# Patient Record
Sex: FEMALE | Race: BLACK OR AFRICAN AMERICAN | NOT HISPANIC OR LATINO | Employment: UNEMPLOYED | URBAN - METROPOLITAN AREA
[De-identification: names, ages, dates, MRNs, and addresses within clinical notes are randomized per-mention and may not be internally consistent; named-entity substitution may affect disease eponyms.]

---

## 2017-01-03 ENCOUNTER — OFFICE VISIT (OUTPATIENT)
Dept: ENDOCRINOLOGY | Facility: CLINIC | Age: 37
End: 2017-01-03
Payer: COMMERCIAL

## 2017-01-03 VITALS
BODY MASS INDEX: 26.05 KG/M2 | SYSTOLIC BLOOD PRESSURE: 114 MMHG | WEIGHT: 152.56 LBS | DIASTOLIC BLOOD PRESSURE: 81 MMHG | HEIGHT: 64 IN | HEART RATE: 80 BPM | RESPIRATION RATE: 18 BRPM

## 2017-01-03 DIAGNOSIS — E04.9 GOITER: ICD-10-CM

## 2017-01-03 DIAGNOSIS — K21.9 GERD WITHOUT ESOPHAGITIS: ICD-10-CM

## 2017-01-03 DIAGNOSIS — E06.9 THYROIDITIS: ICD-10-CM

## 2017-01-03 DIAGNOSIS — F90.9 ATTENTION DEFICIT HYPERACTIVITY DISORDER (ADHD), UNSPECIFIED ADHD TYPE: ICD-10-CM

## 2017-01-03 DIAGNOSIS — E11.65 TYPE 2 DIABETES MELLITUS WITH HYPERGLYCEMIA, WITHOUT LONG-TERM CURRENT USE OF INSULIN: ICD-10-CM

## 2017-01-03 DIAGNOSIS — E03.9 HYPOTHYROIDISM (ACQUIRED): Primary | ICD-10-CM

## 2017-01-03 DIAGNOSIS — F32.89 OTHER DEPRESSION: ICD-10-CM

## 2017-01-03 DIAGNOSIS — F41.9 ANXIETY: ICD-10-CM

## 2017-01-03 DIAGNOSIS — G43.809 OTHER MIGRAINE WITHOUT STATUS MIGRAINOSUS, NOT INTRACTABLE: ICD-10-CM

## 2017-01-03 DIAGNOSIS — E55.9 HYPOVITAMINOSIS D: ICD-10-CM

## 2017-01-03 DIAGNOSIS — M79.7 FIBROMYALGIA: ICD-10-CM

## 2017-01-03 PROBLEM — F32.A DEPRESSION: Status: ACTIVE | Noted: 2017-01-03

## 2017-01-03 PROCEDURE — 1159F MED LIST DOCD IN RCRD: CPT | Mod: S$GLB,,, | Performed by: INTERNAL MEDICINE

## 2017-01-03 PROCEDURE — 2022F DILAT RTA XM EVC RTNOPTHY: CPT | Mod: S$GLB,,, | Performed by: INTERNAL MEDICINE

## 2017-01-03 PROCEDURE — 3044F HG A1C LEVEL LT 7.0%: CPT | Mod: S$GLB,,, | Performed by: INTERNAL MEDICINE

## 2017-01-03 PROCEDURE — 3060F POS MICROALBUMINURIA REV: CPT | Mod: S$GLB,,, | Performed by: INTERNAL MEDICINE

## 2017-01-03 PROCEDURE — 99214 OFFICE O/P EST MOD 30 MIN: CPT | Mod: S$GLB,,, | Performed by: INTERNAL MEDICINE

## 2017-01-03 PROCEDURE — 99999 PR PBB SHADOW E&M-EST. PATIENT-LVL III: CPT | Mod: PBBFAC,,, | Performed by: INTERNAL MEDICINE

## 2017-01-03 RX ORDER — IBUPROFEN 800 MG/1
800 TABLET ORAL 3 TIMES DAILY
COMMUNITY
End: 2017-04-20

## 2017-01-03 RX ORDER — DILTIAZEM HYDROCHLORIDE 180 MG/1
180 CAPSULE, COATED, EXTENDED RELEASE ORAL DAILY
COMMUNITY
End: 2018-03-06

## 2017-01-03 NOTE — MR AVS SNAPSHOT
[facility]  [address] [place] [address]  Phone: [phone]                  Moris VARGAS Dominic   1/3/2017 4:30 PM   Office Visit    Description:  Female : 1980   Provider:  Dany Rukcer MD   Department:  [facility]           Diagnoses this Visit        Comments    Hypothyroidism (acquired)    -  Primary     Goiter         Thyroiditis         GERD without esophagitis         Hypovitaminosis D         Fibromyalgia         Type 2 diabetes mellitus with hyperglycemia, without long-term current use of insulin         Other migraine without status migrainosus, not intractable         Attention deficit hyperactivity disorder (ADHD), unspecified ADHD type         Anxiety         Other depression                To Do List           Future Appointments        Provider Department Dept Phone    2017 4:30 PM Dany Rucker MD [facility] [phone]      Goals (5 Years of Data)     None      Follow-Up and Disposition     Return in about 3 months (around 4/3/2017).      [facility]     [facility] Nurse [facility] Line - 24/7 Assistance  Registered nurses in the [facility] provide clinical advisement, health education, appointment booking, and other advisory services.  Call for this free service at [phone].             Medications           Message regarding Medications     Verify the changes and/or additions to your medication regime listed below are the same as discussed with your clinician today.  If any of these changes or additions are incorrect, please notify your healthcare provider.             Verify that the below list of medications is an accurate representation of the medications you are currently taking.  If none reported, the list may be blank. If incorrect, please contact your healthcare provider. Carry this list with you in case of emergency.           Current Medications     cyclobenzaprine (FLEXERIL) 10 MG  "tablet Take 10 mg by mouth 2 (two) times daily.     dextroamphetamine-amphetamine (ADDERALL) 20 mg tablet Take 1 tablet by mouth 2 (two) times daily.     diltiaZEM (CARDIZEM CD) 180 MG 24 hr capsule Take 180 mg by mouth once daily.    ergocalciferol (VITAMIN D2) 50,000 unit Cap Take 1 capsule (50,000 Units total) by mouth every 7 days.    gabapentin (NEURONTIN) 800 MG tablet Take 800 mg by mouth 3 (three) times daily.    ibuprofen (ADVIL,MOTRIN) 800 MG tablet Take 800 mg by mouth 3 (three) times daily.    metformin (GLUCOPHAGE-XR) 500 MG 24 hr tablet TAKE 1 TABLET BY MOUTH TWICE DAILY WITH MEALS    omeprazole (PRILOSEC) 40 MG capsule TAKE 1 CAPSULE BY MOUTH EVERY DAILY    propranolol (INDERAL LA) 80 MG 24 hr capsule Take 80 mg by mouth once daily.     rizatriptan (MAXALT) 10 MG tablet Take 1 tablet (10 mg total) by mouth daily as needed.    venlafaxine (EFFEXOR-XR) 150 MG Cp24 Take 150 mg by mouth once daily.     zolpidem (AMBIEN CR) 12.5 MG CR tablet Take 1 tablet by mouth nightly as needed.    levothyroxine (SYNTHROID) 88 MCG tablet Take 1 tablet (88 mcg total) by mouth once daily.    meclofenamate (MECLOMEN) 100 MG capsule Take 1 capsule (100 mg total) by mouth 3 (three) times daily.    topiramate (TOPAMAX) 50 MG tablet Take 1 tablet (50 mg total) by mouth 2 (two) times daily.           Clinical Reference Information           Vital Signs - Last Recorded  Most recent update: 1/3/2017  4:37 PM by Sheila Mcgill RN    BP Pulse Resp Ht Wt BMI    114/81 80 18 5' 4" (1.626 m) 69.2 kg (152 lb 8.9 oz) 26.19 kg/m2      Blood Pressure          Most Recent Value    BP  114/81      Allergies as of 1/3/2017     Metronidazole      Immunizations Administered on Date of Encounter - 1/3/2017     None      Orders Placed During Today's Visit     Future Labs/Procedures Expected by Expires    Fructosamine  1/3/2017 3/4/2018    GlycoMark (TM)  1/3/2017 3/4/2018    Hemoglobin A1c  1/3/2017 3/4/2018    Lipid panel  1/3/2017 3/4/2018    " Microalbumin/creatinine urine ratio  1/3/2017 3/4/2018    T3  1/3/2017 3/4/2018    T4, free  1/3/2017 3/4/2018    Thyroglobulin  1/3/2017 3/4/2018    TSH  1/3/2017 3/4/2018    Uric acid  1/3/2017 3/4/2018    Urinalysis  1/3/2017 3/4/2018    Vitamin D  1/3/2017 3/4/2018

## 2017-01-03 NOTE — PROGRESS NOTES
Subjective:      Patient ID: Moris Alfaro is a 36 y.o. female.    Chief Complaint:  36 yr old lady with hypothyroidism on thyroid hormone repletion and type 2 diabetes.      History of Present Illness    Patient is a 36 yr old lady seen in [facility] on account of subclinical hypothyroidism due to Hashimoto's thyroiditis. She also has a background history of chronic migraines, obesity, early onset type 2 diabetes,  anxiety neurosis, conversion disorder and pseudoseizures in the past in addition to GERD.  Patient also has a poorly defined background past history of fibromyalgia and  had been commenced on thyroid hormone repletion with Lt4 @ 88mcg QD.  She is presently on metformin 500mg once DAILY for type 2 diabetes  In addition she had a screening thyroid ultrasound (from 07/15) which was essentially -ve for any significant thyroid nodular disease.  Patient feels well. She is sleeping a little better now but continues to have episodic insomnia.  She is s/p CORINA for endometriosis but has her ovaries insitu. She has been having intermittent hot flashes and drenching sweats and also has some mouth dryness for which she takes regular free fluid. Her hormonal testing however has not showed her to be perimenopausal.  Patient had her opthalmic screening done last year and this was normal with no evidence of retinopathy.  Patient does not receive flu vaccinations by choice. Patient has also never pneumovax again by choice.  Discussed with the patient the pros and cons of considering receiving both vaccinations as she has never received either of them before.  She has no other fresh complaints today.  Review of Systems   Constitutional: Negative for diaphoresis and fatigue.   HENT: Negative for trouble swallowing.    Cardiovascular: Negative for chest pain.   Endocrine: Negative for polyuria.   Musculoskeletal: Negative for arthralgias, back pain and myalgias.   Skin: Negative for color change, pallor and rash.  "  Neurological: Negative for dizziness, numbness and headaches.   Hematological: Does not bruise/bleed easily.   Psychiatric/Behavioral: Negative for confusion.       Objective:  Visit Vitals    /81    Pulse 80    Resp 18    Ht 5' 4" (1.626 m)    Wt 69.2 kg (152 lb 8.9 oz)    BMI 26.19 kg/m2        Physical Exam   Constitutional: She is oriented to person, place, and time. She appears well-developed and well-nourished. No distress.   Pleasant young lady. Not pale, anicteric and afebrile. Well hydrated. Not in any apparent acute distress.   HENT:   Head: Normocephalic and atraumatic.   Mouth/Throat: Oropharynx is clear and moist. No oropharyngeal exudate.   Eyes: Conjunctivae and EOM are normal. Pupils are equal, round, and reactive to light. No scleral icterus.   Neck: Normal range of motion. Neck supple. No JVD present. Thyromegaly present.   Mild non tender thyromegaly today. No cervical adenopathy   Cardiovascular: Normal rate, regular rhythm and normal heart sounds.    No murmur heard.  Pulmonary/Chest: Effort normal and breath sounds normal. No respiratory distress. She has no wheezes.   Abdominal: Soft.   Musculoskeletal: Normal range of motion.   Lymphadenopathy:     She has no cervical adenopathy.   Neurological: She is alert and oriented to person, place, and time. No cranial nerve deficit.   Skin: Skin is warm and dry. No rash noted. She is not diaphoretic. No erythema. No pallor.   Patches and macules on lateral aspects of both thighs.  Mildly scaly, hypopigmented.   Psychiatric: She has a normal mood and affect. Her behavior is normal. Judgment and thought content normal.   Vitals reviewed.      Lab Review:     Results for LILLIAM JEAN (MRN [id number]) as of 1/3/2017 17:01   Ref. Range 9/26/2016 09:04   Sodium Latest Ref Range: 136 - 145 mmol/L 142   Potassium Latest Ref Range: 3.5 - 5.1 mmol/L 3.8   Chloride Latest Ref Range: 95 - 110 mmol/L 111 (H)   CO2 Latest Ref Range: 23 - 29 " mmol/L 20 (L)   Anion Gap Latest Ref Range: 8 - 16 mmol/L 11   BUN, Bld Latest Ref Range: 6 - 20 mg/dL 8   Creatinine Latest Ref Range: 0.5 - 1.4 mg/dL 0.8   eGFR if non African American Latest Ref Range: >60 mL/min/1.73 m^2 >60.0   eGFR if African American Latest Ref Range: >60 mL/min/1.73 m^2 >60.0   Glucose Latest Ref Range: 70 - 110 mg/dL 86   Calcium Latest Ref Range: 8.7 - 10.5 mg/dL 8.8   Alkaline Phosphatase Latest Ref Range: 55 - 135 U/L 54 (L)   Total Protein Latest Ref Range: 6.0 - 8.4 g/dL 6.9   Albumin Latest Ref Range: 3.5 - 5.2 g/dL 3.6   Total Bilirubin Latest Ref Range: 0.1 - 1.0 mg/dL 0.3   AST Latest Ref Range: 10 - 40 U/L 11   ALT Latest Ref Range: 10 - 44 U/L 10   Vit D, 25-Hydroxy Latest Ref Range: 30 - 96 ng/mL 36   Hemoglobin A1C Latest Ref Range: 4.5 - 6.2 % 5.4   Estimated Avg Glucose Latest Ref Range: 68 - 131 mg/dL 108   GlycoMark (TM) Latest Units: ug/mL 19.2   TSH Latest Ref Range: 0.400 - 4.000 uIU/mL 0.290 (L)   T3, Total Latest Ref Range: 60 - 180 ng/dL 53 (L)   Free T4 Latest Ref Range: 0.71 - 1.51 ng/dL 1.35   Thyroglobulin Interpretation Unknown SEE BELOW   Thyroglobulin Antibody Screen Latest Ref Range: <4.0 IU/mL <1.8   Thyroglobulin, Tumor Marker Latest Units: ng/mL 17 (H)   Fructosamine Latest Ref Range: 0 - 285 umol /L 181       Assessment:     1. Hypothyroidism (acquired)  T4, free    T3    TSH    Thyroglobulin    Lipid panel    Uric acid   2. Goiter     3. Thyroiditis     4. GERD without esophagitis     5. Hypovitaminosis D  Vitamin D   6. Fibromyalgia     7. Type 2 diabetes mellitus with hyperglycemia, without long-term current use of insulin  Hemoglobin A1c    Fructosamine    GlycoMark (TM)    Lipid panel    Microalbumin/creatinine urine ratio    Urinalysis   8. Other migraine without status migrainosus, not intractable     9. Attention deficit hyperactivity disorder (ADHD), unspecified ADHD type     10. Anxiety     11. Other depression          Regarding hypothyroidism;  Patient appears clinically euthyroid. To continue LT4 at present dose and may adjust LT4 dose based on results  Regarding early diabetes; Well controlled thus far on metformin; to continue metformin 500mg QD for now and if her HBA1c remains 5.4 or under will have her stop the metformin and she if she can be adequately managed with dietary modifications only.   Regarding GERD; symptomatically stable on PPI therapy.  Regarding hypovitaminosis D; to continue vitamin D supplementation and will recheck 25OH vitamin d levels.  Regarding depression; mood stable. To continue Effexor XR as before    Plan:     FFup in ~ 3mths

## 2017-01-05 ENCOUNTER — LAB VISIT (OUTPATIENT)
Dept: LAB | Facility: HOSPITAL | Age: 37
End: 2017-01-05
Attending: INTERNAL MEDICINE
Payer: COMMERCIAL

## 2017-01-05 DIAGNOSIS — E03.9 HYPOTHYROIDISM (ACQUIRED): ICD-10-CM

## 2017-01-05 DIAGNOSIS — E55.9 HYPOVITAMINOSIS D: ICD-10-CM

## 2017-01-05 DIAGNOSIS — E11.65 TYPE 2 DIABETES MELLITUS WITH HYPERGLYCEMIA, WITHOUT LONG-TERM CURRENT USE OF INSULIN: ICD-10-CM

## 2017-01-05 LAB
25(OH)D3+25(OH)D2 SERPL-MCNC: 37 NG/ML
CHOLEST/HDLC SERPL: 5.1 {RATIO}
HDL/CHOLESTEROL RATIO: 19.5 %
HDLC SERPL-MCNC: 262 MG/DL
HDLC SERPL-MCNC: 51 MG/DL
LDLC SERPL CALC-MCNC: 185.6 MG/DL
NONHDLC SERPL-MCNC: 211 MG/DL
T3 SERPL-MCNC: 88 NG/DL
T4 FREE SERPL-MCNC: 0.94 NG/DL
TRIGL SERPL-MCNC: 127 MG/DL
TSH SERPL DL<=0.005 MIU/L-ACNC: 0.84 UIU/ML
URATE SERPL-MCNC: 4.8 MG/DL

## 2017-01-05 PROCEDURE — 82306 VITAMIN D 25 HYDROXY: CPT

## 2017-01-05 PROCEDURE — 84443 ASSAY THYROID STIM HORMONE: CPT

## 2017-01-05 PROCEDURE — 84480 ASSAY TRIIODOTHYRONINE (T3): CPT

## 2017-01-05 PROCEDURE — 84439 ASSAY OF FREE THYROXINE: CPT

## 2017-01-05 PROCEDURE — 82985 ASSAY OF GLYCATED PROTEIN: CPT

## 2017-01-05 PROCEDURE — 86800 THYROGLOBULIN ANTIBODY: CPT

## 2017-01-05 PROCEDURE — 84378 SUGARS SINGLE QUANT: CPT

## 2017-01-05 PROCEDURE — 84550 ASSAY OF BLOOD/URIC ACID: CPT

## 2017-01-05 PROCEDURE — 83036 HEMOGLOBIN GLYCOSYLATED A1C: CPT

## 2017-01-05 PROCEDURE — 80061 LIPID PANEL: CPT

## 2017-01-06 PROBLEM — E78.5 HYPERLIPIDEMIA: Status: ACTIVE | Noted: 2017-01-06

## 2017-01-06 PROBLEM — E78.00 HYPERCHOLESTEROLEMIA: Status: ACTIVE | Noted: 2017-01-06

## 2017-01-06 LAB
ESTIMATED AVG GLUCOSE: 108 MG/DL
HBA1C MFR BLD HPLC: 5.4 %
THRYOGLOBULIN INTERPRETATION: ABNORMAL
THYROGLOB AB SERPL-ACNC: <1.8 IU/ML
THYROGLOB SERPL-MCNC: 12 NG/ML

## 2017-01-08 LAB — GLYCOMARK (TM): 20.5 UG/ML

## 2017-01-10 ENCOUNTER — PATIENT MESSAGE (OUTPATIENT)
Dept: ENDOCRINOLOGY | Facility: CLINIC | Age: 37
End: 2017-01-10

## 2017-01-10 LAB — FRUCTOSAMINE SERPL-SCNC: 205 UMOL /L (ref 0–285)

## 2017-01-13 DIAGNOSIS — E11.65 TYPE 2 DIABETES MELLITUS WITH HYPERGLYCEMIA, WITHOUT LONG-TERM CURRENT USE OF INSULIN: ICD-10-CM

## 2017-01-13 RX ORDER — METFORMIN HYDROCHLORIDE 500 MG/1
500 TABLET, EXTENDED RELEASE ORAL 2 TIMES DAILY WITH MEALS
Qty: 180 TABLET | Refills: 3 | Status: SHIPPED | OUTPATIENT
Start: 2017-01-13 | End: 2017-02-09

## 2017-02-09 ENCOUNTER — OFFICE VISIT (OUTPATIENT)
Dept: DERMATOLOGY | Facility: CLINIC | Age: 37
End: 2017-02-09
Payer: COMMERCIAL

## 2017-02-09 VITALS — HEIGHT: 64 IN | BODY MASS INDEX: 25.95 KG/M2 | WEIGHT: 152 LBS

## 2017-02-09 DIAGNOSIS — D22.9 MULTIPLE BENIGN NEVI: ICD-10-CM

## 2017-02-09 DIAGNOSIS — Z12.83 SKIN CANCER SCREENING: ICD-10-CM

## 2017-02-09 DIAGNOSIS — L82.1 DERMATOSIS PAPULOSA NIGRA: Primary | ICD-10-CM

## 2017-02-09 DIAGNOSIS — L82.1 SEBORRHEIC KERATOSES: ICD-10-CM

## 2017-02-09 PROCEDURE — 99213 OFFICE O/P EST LOW 20 MIN: CPT | Mod: S$GLB,,, | Performed by: DERMATOLOGY

## 2017-02-09 PROCEDURE — 99999 PR PBB SHADOW E&M-EST. PATIENT-LVL II: CPT | Mod: PBBFAC,,, | Performed by: DERMATOLOGY

## 2017-02-09 NOTE — PROGRESS NOTES
Subjective:       Patient ID:  Moris Alfaro is a 37 y.o. female who presents for   Chief Complaint   Patient presents with    Skin Check     TBSE    Spot     Left shoulder     HPI Comments: Patient last seen 5/10/2016 with dychromia ant thighs, resolved with sun avoidance  Here for FU skin check and new complaint      Spot  - Initial  Affected locations: Left shoulder.  Signs / symptoms: growing  Severity: mild to moderate  Timing: constant  Aggravated by: nothing  Relieving factors/Treatments tried: nothing        Review of Systems   Respiratory: Positive for shortness of breath (cardiology PARKER ).    Musculoskeletal: Positive for arthralgias (attributes to fibromyalgia).   Skin: Negative for sun sensitivity, daily sunscreen use, activity-related sunscreen use and recent sunburn.        Objective:    Physical Exam   Constitutional: She appears well-developed and well-nourished. No distress.   Neurological: She is alert and oriented to person, place, and time. She is not disoriented.   Psychiatric: She has a normal mood and affect.   Skin:   Areas Examined (abnormalities noted in diagram):   Head / Face Inspection Performed  Chest / Axilla Inspection Performed  Abdomen Inspection Performed  Genitals / Buttocks / Groin Inspection Performed  Back Inspection Performed  RUE Inspected  LUE Inspection Performed  RLE Inspected  LLE Inspection Performed  Nails and Digits Inspection Performed                       Diagram Legend     Erythematous scaling macule/papule c/w actinic keratosis       Vascular papule c/w angioma      Pigmented verrucoid papule/plaque c/w seborrheic keratosis      Yellow umbilicated papule c/w sebaceous hyperplasia      Irregularly shaped tan macule c/w lentigo     1-2 mm smooth white papules consistent with Milia      Movable subcutaneous cyst with punctum c/w epidermal inclusion cyst      Subcutaneous movable cyst c/w pilar cyst      Firm pink to brown papule c/w dermatofibroma       Pedunculated fleshy papule(s) c/w skin tag(s)      Evenly pigmented macule c/w junctional nevus     Mildly variegated pigmented, slightly irregular-bordered macule c/w mildly atypical nevus      Flesh colored to evenly pigmented papule c/w intradermal nevus       Pink pearly papule/plaque c/w basal cell carcinoma      Erythematous hyperkeratotic cursted plaque c/w SCC      Surgical scar with no sign of skin cancer recurrence      Open and closed comedones      Inflammatory papules and pustules      Verrucoid papule consistent consistent with wart     Erythematous eczematous patches and plaques     Dystrophic onycholytic nail with subungual debris c/w onychomycosis     Umbilicated papule    Erythematous-base heme-crusted tan verrucoid plaque consistent with inflamed seborrheic keratosis     Erythematous Silvery Scaling Plaque c/w Psoriasis     See annotation      Assessment / Plan:        Dermatosis papulosa nigra, Seborrheic keratoses  These are benign inherited growths without a malignant potential. Reassurance given to patient. No treatment is necessary.       Multiple benign nevi;   Plantar mildly hyperpigmented macules, multiple, no concerning features    Patient instructed in importance in daily sun protection of at least spf 30. Sun avoidance and topical protection discussed.   Recommend Elta MD (physician office) COTZ sensitive (available online) for daily use on face and neck.  Patient encouraged to wear hat for all outdoor exposure.   Also discussed sun protective clothing.      Skin cancer screening  Total body skin examination performed today including at least 12 points as noted in physical examination. No lesions suspicious for malignancy noted.               Return if symptoms worsen or fail to improve.

## 2017-02-16 RX ORDER — TOPIRAMATE 50 MG/1
50 TABLET, FILM COATED ORAL 2 TIMES DAILY
Qty: 60 TABLET | Refills: 11 | Status: SHIPPED | OUTPATIENT
Start: 2017-02-16 | End: 2018-03-15 | Stop reason: SDUPTHER

## 2017-03-14 ENCOUNTER — PATIENT MESSAGE (OUTPATIENT)
Dept: NEUROLOGY | Facility: CLINIC | Age: 37
End: 2017-03-14

## 2017-03-14 ENCOUNTER — OFFICE VISIT (OUTPATIENT)
Dept: OBSTETRICS AND GYNECOLOGY | Facility: CLINIC | Age: 37
End: 2017-03-14
Payer: COMMERCIAL

## 2017-03-14 ENCOUNTER — LAB VISIT (OUTPATIENT)
Dept: LAB | Facility: HOSPITAL | Age: 37
End: 2017-03-14
Attending: SPECIALIST
Payer: COMMERCIAL

## 2017-03-14 VITALS
WEIGHT: 150.13 LBS | BODY MASS INDEX: 25.63 KG/M2 | SYSTOLIC BLOOD PRESSURE: 120 MMHG | DIASTOLIC BLOOD PRESSURE: 72 MMHG | HEIGHT: 64 IN

## 2017-03-14 DIAGNOSIS — Z11.3 SCREENING FOR STD (SEXUALLY TRANSMITTED DISEASE): ICD-10-CM

## 2017-03-14 DIAGNOSIS — R10.2 PELVIC PAIN: ICD-10-CM

## 2017-03-14 DIAGNOSIS — Z01.419 ROUTINE GYNECOLOGICAL EXAMINATION: Primary | ICD-10-CM

## 2017-03-14 PROCEDURE — 99385 PREV VISIT NEW AGE 18-39: CPT | Mod: S$GLB,,, | Performed by: SPECIALIST

## 2017-03-14 PROCEDURE — 87624 HPV HI-RISK TYP POOLED RSLT: CPT

## 2017-03-14 PROCEDURE — 99999 PR PBB SHADOW E&M-EST. PATIENT-LVL IV: CPT | Mod: PBBFAC,,, | Performed by: SPECIALIST

## 2017-03-14 PROCEDURE — 80074 ACUTE HEPATITIS PANEL: CPT

## 2017-03-14 PROCEDURE — 30000890 MAYO MISCELLANEOUS TEST (REFLEX)

## 2017-03-14 PROCEDURE — 36415 COLL VENOUS BLD VENIPUNCTURE: CPT | Mod: PO

## 2017-03-14 PROCEDURE — 88141 CYTOPATH C/V INTERPRET: CPT | Mod: ,,, | Performed by: PATHOLOGY

## 2017-03-14 PROCEDURE — 86695 HERPES SIMPLEX TYPE 1 TEST: CPT | Mod: 59

## 2017-03-14 PROCEDURE — 86694 HERPES SIMPLEX NES ANTBDY: CPT

## 2017-03-14 PROCEDURE — 86703 HIV-1/HIV-2 1 RESULT ANTBDY: CPT

## 2017-03-14 PROCEDURE — 88175 CYTOPATH C/V AUTO FLUID REDO: CPT | Performed by: PATHOLOGY

## 2017-03-14 PROCEDURE — 1160F RVW MEDS BY RX/DR IN RCRD: CPT | Mod: S$GLB,,, | Performed by: SPECIALIST

## 2017-03-14 PROCEDURE — 86592 SYPHILIS TEST NON-TREP QUAL: CPT

## 2017-03-14 PROCEDURE — 86696 HERPES SIMPLEX TYPE 2 TEST: CPT

## 2017-03-14 PROCEDURE — 86695 HERPES SIMPLEX TYPE 1 TEST: CPT

## 2017-03-14 NOTE — PROGRESS NOTES
36 yo BF presents for annual gyn evaluation. Pt with hx of hysterectomy. Pt also c/o ocassional pelvic pain over several months. Pt denies weight loss/gain, constipation/diarrhea, f/c, dysuria, hematuria, flank pain, n/v.  Past Medical History:   Diagnosis Date    Anxiety     Endometriosis, uterus     uterine bladder and ovaries    Fibromyalgia     Migraine     Migraine headache     Mitral valve prolapse     Seizures     Toe fracture        Past Surgical History:   Procedure Laterality Date    ecsi      HYSTERECTOMY  2007    CORINA due to DUB,  ovaries intact    lumbar facet injection      VT EXPLORATORY OF ABDOMEN      RHIZOTOMY      TUBAL LIGATION         Family History   Problem Relation Age of Onset    Thyroid disease Mother     Heart disease Father     Hypertension Father     Pancreatitis Father     Diabetes Father     Stomach cancer Paternal Aunt     Stomach cancer Paternal Uncle     Cancer Paternal Uncle      lung cancer    Melanoma Neg Hx     Psoriasis Neg Hx     Lupus Neg Hx     Eczema Neg Hx        Social History     Social History    Marital status: Legally      Spouse name: N/A    Number of children: N/A    Years of education: N/A     Social History Main Topics    Smoking status: Never Smoker    Smokeless tobacco: Never Used    Alcohol use Yes      Comment: OCCASIONALLY    Drug use: No    Sexual activity: Yes     Partners: Male     Birth control/ protection: None     Other Topics Concern    None     Social History Narrative       Current Outpatient Prescriptions   Medication Sig Dispense Refill    cyclobenzaprine (FLEXERIL) 10 MG tablet Take 10 mg by mouth 2 (two) times daily.       dextroamphetamine-amphetamine (ADDERALL) 20 mg tablet Take 1 tablet by mouth 2 (two) times daily.       diltiaZEM (CARDIZEM CD) 180 MG 24 hr capsule Take 180 mg by mouth once daily.      ergocalciferol (VITAMIN D2) 50,000 unit Cap Take 1 capsule (50,000 Units total) by mouth every  7 days. 12 capsule 3    gabapentin (NEURONTIN) 800 MG tablet Take 800 mg by mouth 3 (three) times daily.  3    omeprazole (PRILOSEC) 40 MG capsule TAKE 1 CAPSULE BY MOUTH EVERY DAILY 90 capsule 3    propranolol (INDERAL LA) 80 MG 24 hr capsule Take 80 mg by mouth once daily.   0    rizatriptan (MAXALT) 10 MG tablet Take 1 tablet (10 mg total) by mouth daily as needed. 9 tablet 5    topiramate (TOPAMAX) 50 MG tablet Take 1 tablet (50 mg total) by mouth 2 (two) times daily. 60 tablet 11    venlafaxine (EFFEXOR-XR) 150 MG Cp24 Take 150 mg by mouth once daily.       zolpidem (AMBIEN CR) 12.5 MG CR tablet Take 1 tablet by mouth nightly as needed.  3    ibuprofen (ADVIL,MOTRIN) 800 MG tablet Take 800 mg by mouth 3 (three) times daily.      levothyroxine (SYNTHROID) 88 MCG tablet Take 1 tablet (88 mcg total) by mouth once daily. 90 tablet 3     No current facility-administered medications for this visit.        Review of patient's allergies indicates:   Allergen Reactions    Metronidazole Hives and Itching       PE:   APPEARANCE: Well nourished, well developed, in no acute distress.  NECK: Neck symmetric without masses or thyromegaly.  NODES: No inguinal lymph node enlargement.  ABDOMEN: Soft. No tenderness or masses. No hepatosplenomegaly. No hernias.  BREASTS: Symmetrical, no skin changes or visible lesions. No palpable masses, nipple discharge or adenopathy bilaterally.  PELVIC: Normal external female genitalia without lesions. Normal hair distribution. Adequate perineal body, normal urethral meatus. Vagina moist and well rugated without lesions or discharge. No significant cystocele or rectocele. Uterus and cervix surgically absent. Bimanual exam revealed no masses, tenderness or abnormality.      I discussed etiologies of pelvic pain and recommend a pelvic u/s to investigate further.  I will follow

## 2017-03-14 NOTE — MR AVS SNAPSHOT
[facility]  [address] [place] [address]  Phone: [phone]                  Moris Alfaro   3/14/2017 1:20 PM   Office Visit    Description:  Female : 1980   Provider:  Chris Hernandez MD   Department:  [facility]           Reason for Visit     Well Woman     Pelvic Pain     rt underarm cyst           Diagnoses this Visit        Comments    Routine gynecological examination    -  Primary            To Do List           Future Appointments        Provider Department Dept Phone    2017 4:30 PM Dany Rucker MD [facility] [phone]      Goals (5 Years of Data)     None      [facility]     [facility] -  Assistance  Registered nurses in the [facility] provide clinical advisement, health education, appointment booking, and other advisory services.  Call for this free service at [phone].             Medications           Message regarding Medications     Verify the changes and/or additions to your medication regime listed below are the same as discussed with your clinician today.  If any of these changes or additions are incorrect, please notify your healthcare provider.             Verify that the below list of medications is an accurate representation of the medications you are currently taking.  If none reported, the list may be blank. If incorrect, please contact your healthcare provider. Carry this list with you in case of emergency.           Current Medications     cyclobenzaprine (FLEXERIL) 10 MG tablet Take 10 mg by mouth 2 (two) times daily.     dextroamphetamine-amphetamine (ADDERALL) 20 mg tablet Take 1 tablet by mouth 2 (two) times daily.     diltiaZEM (CARDIZEM CD) 180 MG 24 hr capsule Take 180 mg by mouth once daily.    ergocalciferol (VITAMIN D2) 50,000 unit Cap Take 1 capsule (50,000 Units total) by mouth every 7 days.    gabapentin (NEURONTIN) 800 MG tablet Take 800 mg by  "mouth 3 (three) times daily.    omeprazole (PRILOSEC) 40 MG capsule TAKE 1 CAPSULE BY MOUTH EVERY DAILY    propranolol (INDERAL LA) 80 MG 24 hr capsule Take 80 mg by mouth once daily.     rizatriptan (MAXALT) 10 MG tablet Take 1 tablet (10 mg total) by mouth daily as needed.    topiramate (TOPAMAX) 50 MG tablet Take 1 tablet (50 mg total) by mouth 2 (two) times daily.    venlafaxine (EFFEXOR-XR) 150 MG Cp24 Take 150 mg by mouth once daily.     zolpidem (AMBIEN CR) 12.5 MG CR tablet Take 1 tablet by mouth nightly as needed.    ibuprofen (ADVIL,MOTRIN) 800 MG tablet Take 800 mg by mouth 3 (three) times daily.    levothyroxine (SYNTHROID) 88 MCG tablet Take 1 tablet (88 mcg total) by mouth once daily.           Clinical Reference Information           Your Vitals Were     BP Height Weight BMI       120/72 5' 4" (1.626 m) 68.1 kg (150 lb 2.1 oz) 25.77 kg/m2       Blood Pressure          Most Recent Value    BP  120/72      Allergies as of 3/14/2017     Metronidazole      Immunizations Administered on Date of Encounter - 3/14/2017     None      Orders Placed During Today's Visit      Normal Orders This Visit    Liquid-based pap smear, screening       Language Assistance Services     ATTENTION: Language assistance services are available, free of charge. Please call [phone].      ATENCIÓN: Si habla daynaañol, tiene a paulson disposición servicios gratuitos de asistencia lingüística. Llame al [phone].     CHÚ Ý: N?u b?n nói Ti?ng Vi?t, có các d?ch v? h? tr? ngôn ng? mi?n phí dành cho b?n. G?i s? [phone].         [facility] complies with applicable Federal civil rights laws and does not discriminate on the basis of race, color, national origin, age, disability, or sex.        "

## 2017-03-15 ENCOUNTER — HOSPITAL ENCOUNTER (OUTPATIENT)
Dept: RADIOLOGY | Facility: CLINIC | Age: 37
Discharge: HOME | End: 2017-03-15
Attending: SPECIALIST
Payer: COMMERCIAL

## 2017-03-15 DIAGNOSIS — R10.2 PELVIC PAIN: ICD-10-CM

## 2017-03-15 LAB
C TRACH DNA SPEC QL NAA+PROBE: NEGATIVE
HAV IGM SERPL QL IA: NEGATIVE
HBV CORE IGM SERPL QL IA: NEGATIVE
HBV SURFACE AG SERPL QL IA: NEGATIVE
HCV AB SERPL QL IA: NEGATIVE
HIV 1+2 AB+HIV1 P24 AG SERPL QL IA: NEGATIVE
N GONORRHOEA DNA SPEC QL NAA+PROBE: NEGATIVE
RPR SER QL: NORMAL

## 2017-03-15 PROCEDURE — 76856 US EXAM PELVIC COMPLETE: CPT | Mod: TC,PO

## 2017-03-15 PROCEDURE — 76856 US EXAM PELVIC COMPLETE: CPT | Mod: 26,,, | Performed by: RADIOLOGY

## 2017-03-15 PROCEDURE — 76830 TRANSVAGINAL US NON-OB: CPT | Mod: 26,,, | Performed by: RADIOLOGY

## 2017-03-16 ENCOUNTER — PATIENT MESSAGE (OUTPATIENT)
Dept: OBSTETRICS AND GYNECOLOGY | Facility: CLINIC | Age: 37
End: 2017-03-16

## 2017-03-16 DIAGNOSIS — R94.6 ABNORMAL THYROID FUNCTION TEST: ICD-10-CM

## 2017-03-16 DIAGNOSIS — E03.4 HYPOTHYROIDISM DUE TO ACQUIRED ATROPHY OF THYROID: ICD-10-CM

## 2017-03-16 LAB — MAYO MISCELLANEOUS RESULT (REF): NORMAL

## 2017-03-16 RX ORDER — LEVOTHYROXINE SODIUM 88 UG/1
88 TABLET ORAL DAILY
Qty: 90 TABLET | Refills: 3 | Status: SHIPPED | OUTPATIENT
Start: 2017-03-16 | End: 2017-10-24 | Stop reason: SDUPTHER

## 2017-03-17 LAB
HSV1 IGG SERPL QL IA: NEGATIVE
HSV2 IGG SERPL QL IA: NEGATIVE

## 2017-03-21 ENCOUNTER — OFFICE VISIT (OUTPATIENT)
Dept: NEUROLOGY | Facility: CLINIC | Age: 37
End: 2017-03-21
Payer: COMMERCIAL

## 2017-03-21 VITALS
HEIGHT: 64 IN | SYSTOLIC BLOOD PRESSURE: 114 MMHG | BODY MASS INDEX: 25.44 KG/M2 | WEIGHT: 149 LBS | DIASTOLIC BLOOD PRESSURE: 72 MMHG | HEART RATE: 82 BPM | RESPIRATION RATE: 20 BRPM

## 2017-03-21 DIAGNOSIS — R53.1 LEFT-SIDED WEAKNESS: Primary | ICD-10-CM

## 2017-03-21 PROCEDURE — 1160F RVW MEDS BY RX/DR IN RCRD: CPT | Mod: S$GLB,,, | Performed by: PSYCHIATRY & NEUROLOGY

## 2017-03-21 PROCEDURE — 99999 PR PBB SHADOW E&M-EST. PATIENT-LVL III: CPT | Mod: PBBFAC,,, | Performed by: PSYCHIATRY & NEUROLOGY

## 2017-03-21 PROCEDURE — 99214 OFFICE O/P EST MOD 30 MIN: CPT | Mod: S$GLB,,, | Performed by: PSYCHIATRY & NEUROLOGY

## 2017-03-21 RX ORDER — ZOLMITRIPTAN 5 MG/1
1 SPRAY NASAL ONCE AS NEEDED
Qty: 6 EACH | Refills: 3 | Status: SHIPPED | OUTPATIENT
Start: 2017-03-21 | End: 2017-04-20

## 2017-03-21 RX ORDER — PROPRANOLOL HYDROCHLORIDE 80 MG/1
80 CAPSULE, EXTENDED RELEASE ORAL DAILY
Qty: 30 CAPSULE | Refills: 4 | Status: SHIPPED | OUTPATIENT
Start: 2017-03-21 | End: 2017-10-03 | Stop reason: SDUPTHER

## 2017-03-21 NOTE — MR AVS SNAPSHOT
[facility]  [address] [place] [address]  Phone: [phone]  Fax: [phone]                  Moris Alfaro   3/21/2017 1:30 PM   Office Visit    Description:  Female : 1980   Provider:  Stephanie Singer MD   Department:  [facility]           Reason for Visit     Headache           Diagnoses this Visit        Comments    Left-sided weakness    -  Primary            To Do List           Future Appointments        Provider Department Dept Phone    3/29/2017 9:00 AM [facility] MRI1 300 LB LIMIT [facility] [phone]    3/30/2017 10:40 AM Chris Hernandez MD [facility] - [phone]    2017 4:30 PM Dany Rucker MD [place] - Endo/Diabetes [phone]    2017 10:30 AM Stephanie Singer MD [facility] Neurology [phone]      Your Future Surgeries/Procedures     2017   Surgery with Chris Hernandez MD   [facility] ([place])    [address] [place] [address] [phone]              Goals (5 Years of Data)     None       These Medications        Disp Refills Start End    zolmitriptan (ZOMIG) 5 mg Spry 6 each 3 3/21/2017 3/21/2017    1 spray by Nasal route once as needed. - Nasal    Pharmacy: [facility] [address] [place] [address] [address] AT [facility] Ph #: [phone]       propranolol (INDERAL [place]) 80 MG 24 hr capsule 30 capsule 4 3/21/2017     Take 1 capsule (80 mg total) by mouth once daily. - Oral    Pharmacy: [facility] [address] [facility], [place] - [address] AT [facility] Ph #: [phone]         [facility]     [facility] -  Assistance  Registered nurses in the [facility] provide clinical advisement, health education, appointment booking, and other advisory services.  Call for this free service at [phone].             Medications           Message  regarding Medications     Verify the changes and/or additions to your medication regime listed below are the same as discussed with your clinician today.  If any of these changes or additions are incorrect, please notify your healthcare provider.        START taking these NEW medications        Refills    zolmitriptan (ZOMIG) 5 mg Spry 3    Si spray by Nasal route once as needed.    Class: Normal    Route: Nasal      CHANGE how you are taking these medications     Start Taking Instead of    propranolol (INDERAL LA) 80 MG 24 hr capsule propranolol (INDERAL LA) 80 MG 24 hr capsule    Dosage:  Take 1 capsule (80 mg total) by mouth once daily. Dosage:  Take 80 mg by mouth once daily.     Reason for Change:  Therapy completed       STOP taking these medications     rizatriptan (MAXALT) 10 MG tablet Take 1 tablet (10 mg total) by mouth daily as needed.           Verify that the below list of medications is an accurate representation of the medications you are currently taking.  If none reported, the list may be blank. If incorrect, please contact your healthcare provider. Carry this list with you in case of emergency.           Current Medications     cyclobenzaprine (FLEXERIL) 10 MG tablet Take 10 mg by mouth 2 (two) times daily.     dextroamphetamine-amphetamine (ADDERALL) 20 mg tablet Take 1 tablet by mouth 2 (two) times daily.     diltiaZEM (CARDIZEM CD) 180 MG 24 hr capsule Take 180 mg by mouth once daily.    ergocalciferol (VITAMIN D2) 50,000 unit Cap Take 1 capsule (50,000 Units total) by mouth every 7 days.    gabapentin (NEURONTIN) 800 MG tablet Take 800 mg by mouth 3 (three) times daily.    ibuprofen (ADVIL,MOTRIN) 800 MG tablet Take 800 mg by mouth 3 (three) times daily.    levothyroxine (SYNTHROID) 88 MCG tablet Take 1 tablet (88 mcg total) by mouth once daily.    omeprazole (PRILOSEC) 40 MG capsule TAKE 1 CAPSULE BY MOUTH EVERY DAILY    propranolol (INDERAL LA) 80 MG 24 hr capsule Take 1 capsule (80 mg  "total) by mouth once daily.    topiramate (TOPAMAX) 50 MG tablet Take 1 tablet (50 mg total) by mouth 2 (two) times daily.    venlafaxine (EFFEXOR-XR) 150 MG Cp24 Take 150 mg by mouth once daily.     zolpidem (AMBIEN CR) 12.5 MG CR tablet Take 1 tablet by mouth nightly as needed.    zolmitriptan (ZOMIG) 5 mg Spry 1 spray by Nasal route once as needed.           Clinical Reference Information           Your Vitals Were     BP Pulse Resp Height Weight BMI    114/72 82 20 5' 4" (1.626 m) 67.6 kg (149 lb) 25.58 kg/m2      Blood Pressure          Most Recent Value    BP  114/72      Allergies as of 3/21/2017     Metronidazole      Immunizations Administered on Date of Encounter - 3/21/2017     None      Orders Placed During Today's Visit     Future Labs/Procedures Expected by Expires    MRI Brain W WO Contrast  3/21/2017 3/21/2018      Language Assistance Services     ATTENTION: Language assistance services are available, free of charge. Please call [phone].      ATENCIÓN: Si habla sherrell, tiene a paulson disposición servicios gratuitos de asistencia lingüística. Llame al [phone].     CLAUDIA Ý: N?u b?n nói Ti?ng Vi?t, có các d?ch v? h? tr? ngôn ng? mi?n phí dành cho b?n. G?i s? [phone].         [facility] complies with applicable Federal civil rights laws and does not discriminate on the basis of race, color, national origin, age, disability, or sex.        "

## 2017-03-21 NOTE — PROGRESS NOTES
Subjective:       Patient ID: Moris Alfaro is a 36 y.o. female.    Chief Complaint: Migraine  INTERVAL HISTORY  The patient states that headaches are unchanged. Maxalt not working. She was offered Botox but does not wish to proceed. She admits to a lot of stress and fibromyalgia flare ups. She also states that her left side of the body feels weak. This is intermittent, her  will weaken and she will drop her curling iron.  HPI   The patient is a former patient of the late Dr. Cristofer Pink who presents for evaluation of Migraine. She states she has had them for at least 12 years. Onset coincided with treatment with Depo provera. Headaches are located in the frontal region, radiating to the posterior region. The quality is excruciating, throbbing/pounding and sharp.  Intensity varies from mild to moderate and severe. She has more than 15 days affected with headache per month and least 8 attacks that meet criteria for migraine. It is associated with sensitivity to light, noise, and smells, blurred vision, nausea, dizziness, problems with concentration, neck tightness and neck pain. Seldom, she wakes up in the middle of the night with an attack. No ameliorating factors. Worse with light, noise, smells, bending over and raising her head when lying in bed.    Review of Systems   Constitutional: Positive for fatigue. Negative for activity change, appetite change and fever.   HENT: Positive for postnasal drip and rhinorrhea. Negative for congestion, dental problem, hearing loss, sinus pressure, tinnitus, trouble swallowing and voice change.    Eyes: Negative for photophobia, pain, redness and visual disturbance.   Respiratory: Positive for shortness of breath. Negative for cough and chest tightness.    Cardiovascular: Positive for chest pain and palpitations. Negative for leg swelling.   Gastrointestinal: Negative for abdominal pain, blood in stool, nausea and vomiting.   Endocrine: Negative for cold intolerance  and heat intolerance.   Genitourinary: Positive for frequency. Negative for difficulty urinating, menstrual problem and urgency.   Musculoskeletal: Positive for arthralgias, back pain, myalgias and neck pain. Negative for gait problem, joint swelling and neck stiffness.   Skin: Positive for color change and rash.   Neurological: Positive for dizziness and headaches. Negative for tremors, seizures, syncope, facial asymmetry, speech difficulty, weakness, light-headedness and numbness.   Hematological: Negative for adenopathy. Does not bruise/bleed easily.   Psychiatric/Behavioral: Positive for sleep disturbance. Negative for agitation, behavioral problems, confusion, decreased concentration, self-injury and suicidal ideas. The patient is nervous/anxious. The patient is not hyperactive.          Past Medical History   Diagnosis Date    Anxiety     Endometriosis, uterus      uterine bladder and ovaries    Fibromyalgia     Migraine     Migraine headache     Mitral valve prolapse     Seizures     Toe fracture      Past Surgical History   Procedure Laterality Date    Pr exploratory of abdomen      Tubal ligation      Hysterectomy  2007     CORINA due to DUB,  ovaries intact    Ecsi      Lumbar facet injection      Rhizotomy       Family History   Problem Relation Age of Onset    Thyroid disease Mother     Heart disease Father     Hypertension Father     Pancreatitis Father     Diabetes Father     Stomach cancer Paternal Aunt     Stomach cancer Paternal Uncle     Cancer Paternal Uncle      lung cancer    Melanoma Neg Hx     Psoriasis Neg Hx     Lupus Neg Hx     Eczema Neg Hx      Social History     Social History    Marital status:      Spouse name: N/A    Number of children: N/A    Years of education: N/A     Occupational History    Not on file.     Social History Main Topics    Smoking status: Never Smoker    Smokeless tobacco: Never Used    Alcohol use Yes      Comment: OCCASIONALLY     Drug use: No    Sexual activity: Yes     Partners: Male     Other Topics Concern    Not on file     Social History Narrative     Allergies   Allergen Reactions    Metronidazole Hives and Itching       Current Outpatient Prescriptions   Medication Sig    cyclobenzaprine (FLEXERIL) 10 MG tablet Take 10 mg by mouth 2 (two) times daily.     dextroamphetamine-amphetamine (ADDERALL) 20 mg tablet Take 1 tablet by mouth 2 (two) times daily.     diltiaZEM (CARDIZEM CD) 180 MG 24 hr capsule Take 180 mg by mouth once daily.    ergocalciferol (VITAMIN D2) 50,000 unit Cap Take 1 capsule (50,000 Units total) by mouth every 7 days.    gabapentin (NEURONTIN) 800 MG tablet Take 800 mg by mouth 3 (three) times daily.    ibuprofen (ADVIL,MOTRIN) 800 MG tablet Take 800 mg by mouth 3 (three) times daily.    levothyroxine (SYNTHROID) 88 MCG tablet Take 1 tablet (88 mcg total) by mouth once daily.    omeprazole (PRILOSEC) 40 MG capsule TAKE 1 CAPSULE BY MOUTH EVERY DAILY    propranolol (INDERAL LA) 80 MG 24 hr capsule Take 80 mg by mouth once daily.     rizatriptan (MAXALT) 10 MG tablet Take 1 tablet (10 mg total) by mouth daily as needed.    topiramate (TOPAMAX) 50 MG tablet Take 1 tablet (50 mg total) by mouth 2 (two) times daily.    venlafaxine (EFFEXOR-XR) 150 MG Cp24 Take 150 mg by mouth once daily.     zolpidem (AMBIEN CR) 12.5 MG CR tablet Take 1 tablet by mouth nightly as needed.     No current facility-administered medications for this visit.        Objective:      Vitals:    03/21/17 1322   BP: 114/72   Pulse: 82   Resp: 20     Body mass index is 25.58 kg/(m^2).    Physical Exam    Constitutional:   She appears well-developed and well-nourished. She is well groomed    HENT:    Head: Atraumatic, oral and nasal mucosa intact  Eyes: Conjunctivae and EOM are normal. Pupils are equal, round, and reactive to light OU  Neck: Neck supple. No thyromegaly present  Cardiovascular: Normal rate and normal heart sounds  No  murmur heard  Pulmonary/Chest: Effort normal and breath sounds normal  Musculoskeletal: Normal range of motion. No joint stiffness. No vertebral point tenderness  Skin: Skin is warm and dry  Psychiatric: Normal mood and affect     Neuro exam:    Mental status:  Awake, attentive, Alert, oriented to self, place, year and month  Language function is intact  Speech fluency is good and speech is clear  Remote and recent memory are good  No findings to suggest executive dysfuntion    Patient has adequate insight        Cranial Nerves:  Smell was not formally evaluated  Cranial Nerves II - XII: intact  Pursuits were smooth, normal saccades, no nystagmus OU  Motor - facial movement was symmetrical and normal     Palate moved well and was symmetrical with normal palatal and oral sensation  Tongue movements were full    Coordination:     Rapid alternating movements and rapid finger tapping - normal bilaterally  Finger to nose - normal and symmetric bilaterally   Heel to shin test - normal and symmetric bilaterally   Arm roll - smooth and symmetric   No intentional or positional tremor.     Motor:  Normal muscle bulk and symmetry. No fasciculations were noted    Questionable left side pronator drift  Strength 5/5 bilaterally     Reflexes:  Tendon reflexes were 2 + at biceps, triceps, brachioradialis, patellar, and Achilles bilaterally  On plantar stimulation toes were down going bilaterally  No clonus was noted     Sensory: Intact toprimary modalities  Gait: Romberg absent. Normal gait. Normal arm swing and turns. Normal postural reflexes.     Review of Data: I reviewed records as available in the system including encounters, labs (normal CBC, CMP, ESR) US of neck (Normal thyroid), CT head (negative)        Assessment and Plan   Intractable chronic migraine without aura and without status migrainosus. Failure to multiple meds. Now on Topamax, Propranolol, Effexor. Does not wish to proceed with Botox. Maxalt ineffective.  Will  change to Zomig Nasal Spray.  Give away weakness of the left side. Questionable pronator drift. MRI of the brain ordered  Encourage to follow up with psychiatry and psychology  RTC in 3 months    Johanne Singer M.D  Medical Director, Headache and Facial Pain  [facility]

## 2017-03-22 LAB
HPV16 DNA SPEC QL NAA+PROBE: NEGATIVE
HPV16+18+H RISK 12 DNA CVX-IMP: NEGATIVE
HPV18 DNA SPEC QL NAA+PROBE: NEGATIVE

## 2017-03-28 ENCOUNTER — PATIENT MESSAGE (OUTPATIENT)
Dept: NEUROLOGY | Facility: CLINIC | Age: 37
End: 2017-03-28

## 2017-03-30 ENCOUNTER — OFFICE VISIT (OUTPATIENT)
Dept: OBSTETRICS AND GYNECOLOGY | Facility: CLINIC | Age: 37
End: 2017-03-30
Payer: COMMERCIAL

## 2017-03-30 ENCOUNTER — LAB VISIT (OUTPATIENT)
Dept: LAB | Facility: HOSPITAL | Age: 37
End: 2017-03-30
Attending: SPECIALIST
Payer: COMMERCIAL

## 2017-03-30 VITALS
DIASTOLIC BLOOD PRESSURE: 76 MMHG | WEIGHT: 149.94 LBS | HEIGHT: 64 IN | BODY MASS INDEX: 25.6 KG/M2 | SYSTOLIC BLOOD PRESSURE: 124 MMHG

## 2017-03-30 DIAGNOSIS — R10.2 PELVIC PAIN: ICD-10-CM

## 2017-03-30 DIAGNOSIS — Z01.818 PREOP EXAMINATION: Primary | ICD-10-CM

## 2017-03-30 DIAGNOSIS — Z01.818 PREOP EXAMINATION: ICD-10-CM

## 2017-03-30 LAB
BASOPHILS # BLD AUTO: 0.01 K/UL
BASOPHILS NFR BLD: 0.2 %
DIFFERENTIAL METHOD: ABNORMAL
EOSINOPHIL # BLD AUTO: 0.1 K/UL
EOSINOPHIL NFR BLD: 1 %
ERYTHROCYTE [DISTWIDTH] IN BLOOD BY AUTOMATED COUNT: 13.1 %
HCG INTACT+B SERPL-ACNC: <1.2 MIU/ML
HCT VFR BLD AUTO: 38.6 %
HGB BLD-MCNC: 12.3 G/DL
LYMPHOCYTES # BLD AUTO: 2 K/UL
LYMPHOCYTES NFR BLD: 32 %
MCH RBC QN AUTO: 27.7 PG
MCHC RBC AUTO-ENTMCNC: 31.9 %
MCV RBC AUTO: 87 FL
MONOCYTES # BLD AUTO: 0.6 K/UL
MONOCYTES NFR BLD: 8.9 %
NEUTROPHILS # BLD AUTO: 3.6 K/UL
NEUTROPHILS NFR BLD: 57.4 %
PLATELET # BLD AUTO: 301 K/UL
PMV BLD AUTO: 10.9 FL
RBC # BLD AUTO: 4.44 M/UL
WBC # BLD AUTO: 6.21 K/UL

## 2017-03-30 PROCEDURE — 1160F RVW MEDS BY RX/DR IN RCRD: CPT | Mod: S$GLB,,, | Performed by: SPECIALIST

## 2017-03-30 PROCEDURE — 84702 CHORIONIC GONADOTROPIN TEST: CPT

## 2017-03-30 PROCEDURE — 85025 COMPLETE CBC W/AUTO DIFF WBC: CPT

## 2017-03-30 PROCEDURE — 36415 COLL VENOUS BLD VENIPUNCTURE: CPT | Mod: PO

## 2017-03-30 PROCEDURE — 99213 OFFICE O/P EST LOW 20 MIN: CPT | Mod: S$GLB,,, | Performed by: SPECIALIST

## 2017-03-30 PROCEDURE — 99999 PR PBB SHADOW E&M-EST. PATIENT-LVL III: CPT | Mod: PBBFAC,,, | Performed by: SPECIALIST

## 2017-03-30 NOTE — PROGRESS NOTES
38 yo BF presents for preoperative evaluation for proposed Dx laparoscope after recent pelvic u/s reveals left complex cystic mass.  Pelvic u/s as follows...Narrative   The Uterus is not visualized consistent with hysterectomy.    The right ovary of 2.6 x 1.6 x 1.8 cm is noted    The left ovary measures 8.2 x 7.7 x 3.6 cm and demonstrates a multiloculated septated cystic structure of 6.1 x 7.5 x 3.3 cm.     This could relate to a dilated fallopian tube twisted upon itself or a cystic mass of the ovary which be worrisome for a neoplastic process.     Female pelvis MRI evaluation with and without contrast may be helpful for further evaluation. A neoplastic cystic ovarian mass cannot be excluded and further evaluation is necessary.    Some free pelvic fluid is noted which would be common in a premenopausal patient   Impression       1. Multiloculated cystic mass in the region of the left ovary. A cystic ovarian neoplasm cannot be excluded, this could less likely relate to a dilated fallopian tube residual from the patient's hysterectomy. Female pelvis MRI evaluation would be helpful for further evaluation/follow up.      Electronically signed by: Elieser Chacon MD  Date: 03/15/17  Time: 12:34      Past Medical History:   Diagnosis Date    Anxiety     Endometriosis, uterus     uterine bladder and ovaries    Fibromyalgia     Migraine     Migraine headache     Mitral valve prolapse     Seizures     Toe fracture        Past Surgical History:   Procedure Laterality Date    ecsi      HYSTERECTOMY  2007    CORINA due to DUB,  ovaries intact    lumbar facet injection      [place] EXPLORATORY OF ABDOMEN      RHIZOTOMY      TUBAL LIGATION         Family History   Problem Relation Age of Onset    Thyroid disease Mother     Heart disease Father     Hypertension Father     Pancreatitis Father     Diabetes Father     Stomach cancer Paternal Aunt     Stomach cancer Paternal Uncle     Cancer Paternal Uncle      lung cancer     Melanoma Neg Hx     Psoriasis Neg Hx     Lupus Neg Hx     Eczema Neg Hx        Social History     Social History    Marital status: Legally      Spouse name: N/A    Number of children: N/A    Years of education: N/A     Social History Main Topics    Smoking status: Never Smoker    Smokeless tobacco: Never Used    Alcohol use Yes      Comment: OCCASIONALLY    Drug use: No    Sexual activity: Yes     Partners: Male     Birth control/ protection: None     Other Topics Concern    None     Social History Narrative       Current Outpatient Prescriptions   Medication Sig Dispense Refill    cyclobenzaprine (FLEXERIL) 10 MG tablet Take 10 mg by mouth 2 (two) times daily.       dextroamphetamine-amphetamine (ADDERALL) 20 mg tablet Take 1 tablet by mouth 2 (two) times daily.       diltiaZEM (CARDIZEM CD) 180 MG 24 hr capsule Take 180 mg by mouth once daily.      ergocalciferol (VITAMIN D2) 50,000 unit Cap Take 1 capsule (50,000 Units total) by mouth every 7 days. 12 capsule 3    gabapentin (NEURONTIN) 800 MG tablet Take 800 mg by mouth 3 (three) times daily.  3    levothyroxine (SYNTHROID) 88 MCG tablet Take 1 tablet (88 mcg total) by mouth once daily. 90 tablet 3    omeprazole (PRILOSEC) 40 MG capsule TAKE 1 CAPSULE BY MOUTH EVERY DAILY 90 capsule 3    propranolol (INDERAL LA) 80 MG 24 hr capsule Take 1 capsule (80 mg total) by mouth once daily. 30 capsule 4    topiramate (TOPAMAX) 50 MG tablet Take 1 tablet (50 mg total) by mouth 2 (two) times daily. 60 tablet 11    venlafaxine (EFFEXOR-XR) 150 MG Cp24 Take 150 mg by mouth once daily.       zolpidem (AMBIEN CR) 12.5 MG CR tablet Take 1 tablet by mouth nightly as needed.  3    ibuprofen (ADVIL,MOTRIN) 800 MG tablet Take 800 mg by mouth 3 (three) times daily.      zolmitriptan (ZOMIG) 5 mg Spry 1 spray by Nasal route once as needed. 6 each 3     No current facility-administered medications for this visit.        Review of patient's  allergies indicates:   Allergen Reactions    Metronidazole Hives and Itching     PE:   APPEARANCE: Well nourished, well developed, in no acute distress.  NECK: Neck symmetric without masses or thyromegaly.  NODES: No inguinal lymph node enlargement.  ABDOMEN: Soft. No tenderness or masses. No hepatosplenomegaly. No hernias.  BREASTS: Symmetrical, no skin changes or visible lesions. No palpable masses, nipple discharge or adenopathy bilaterally.  PELVIC: Normal external female genitalia without lesions. Normal hair distribution. Adequate perineal body, normal urethral meatus. Vagina moist and well rugated without lesions or discharge. No significant cystocele or rectocele. Uterus and cervix surgically absent. Bimanual exam reveals adnexal fullness left    I discussed the risks and benefits of dx laparoscope. I discussed risks of bowel/bladder /ureteral injury, need for open procedure, infection, bleeding, prolonged recovery.  Consents and orders obtained  Will proceed ASU 4/3/17 8 am

## 2017-03-30 NOTE — MR AVS SNAPSHOT
[facility] - OB/GYN  [address] [place] [address]  Phone: [phone]                  Moris Alfaro   3/30/2017 10:40 AM   Office Visit    Description:  Female : 1980   Provider:  Chris Hernandez MD   Department:  [facility] - OB/GYN           Reason for Visit     Pre-op Exam                To Do List           Future Appointments        Provider Department Dept Phone    2017 4:30 PM Dany Rucker MD [place] - Endo/Diabetes [phone]    2017 10:30 AM Stephanie Singer MD [facility] Neurology [phone]      Your Future Surgeries/Procedures     2017   Surgery with Chris Hernandez MD   [facility] [place]    [address] [facility] [address] [place] [id number] [phone]              Goals (5 Years of Data)     None      [facility]     [facility] Care [facility] -  Assistance  Unless otherwise directed by your provider, please contact [facility], our nurse care line that is available for  assistance.     Registered nurses in the [facility] provide: appointment scheduling, clinical advisement, health education, and other advisory services.  Call: [phone] (toll free)               Medications           Message regarding Medications     Verify the changes and/or additions to your medication regime listed below are the same as discussed with your clinician today.  If any of these changes or additions are incorrect, please notify your healthcare provider.             Verify that the below list of medications is an accurate representation of the medications you are currently taking.  If none reported, the list may be blank. If incorrect, please contact your healthcare provider. Carry this list with you in case of emergency.           Current Medications     cyclobenzaprine (FLEXERIL) 10 MG tablet Take 10 mg by mouth 2 (two) times daily.     dextroamphetamine-amphetamine (ADDERALL) 20  "mg tablet Take 1 tablet by mouth 2 (two) times daily.     diltiaZEM (CARDIZEM CD) 180 MG 24 hr capsule Take 180 mg by mouth once daily.    ergocalciferol (VITAMIN D2) 50,000 unit Cap Take 1 capsule (50,000 Units total) by mouth every 7 days.    gabapentin (NEURONTIN) 800 MG tablet Take 800 mg by mouth 3 (three) times daily.    levothyroxine (SYNTHROID) 88 MCG tablet Take 1 tablet (88 mcg total) by mouth once daily.    omeprazole (PRILOSEC) 40 MG capsule TAKE 1 CAPSULE BY MOUTH EVERY DAILY    propranolol (INDERAL LA) 80 MG 24 hr capsule Take 1 capsule (80 mg total) by mouth once daily.    topiramate (TOPAMAX) 50 MG tablet Take 1 tablet (50 mg total) by mouth 2 (two) times daily.    venlafaxine (EFFEXOR-XR) 150 MG Cp24 Take 150 mg by mouth once daily.     zolpidem (AMBIEN CR) 12.5 MG CR tablet Take 1 tablet by mouth nightly as needed.    ibuprofen (ADVIL,MOTRIN) 800 MG tablet Take 800 mg by mouth 3 (three) times daily.    zolmitriptan (ZOMIG) 5 mg Spry 1 spray by Nasal route once as needed.           Clinical Reference Information           Your Vitals Were     BP Height Weight BMI       124/76 5' 4" (1.626 m) 68 kg (149 lb 14.6 oz) 25.73 kg/m2       Blood Pressure          Most Recent Value    BP  124/76      Allergies as of 3/30/2017     Metronidazole      Immunizations Administered on Date of Encounter - 3/30/2017     None      Language Assistance Services     ATTENTION: Language assistance services are available, free of charge. Please call [phone].      ATENCIÓN: Si madan wynne, tiene a paulson disposición servicios gratuitos de asistencia lingüística. Llame al [phone].     CHÚ Ý: N?u b?n nói Ti?ng Vi?t, có các d?ch v? h? tr? ngôn ng? mi?n phí dành cho b?n. G?i s? [phone].         [facility] - OB/GYN complies with applicable Federal civil rights laws and does not discriminate on the basis of race, color, national origin, age, disability, or sex.        "

## 2017-03-31 ENCOUNTER — ANESTHESIA EVENT (OUTPATIENT)
Dept: SURGERY | Facility: HOSPITAL | Age: 37
End: 2017-03-31
Payer: COMMERCIAL

## 2017-04-03 ENCOUNTER — PATIENT MESSAGE (OUTPATIENT)
Dept: OBSTETRICS AND GYNECOLOGY | Facility: CLINIC | Age: 37
End: 2017-04-03

## 2017-04-03 ENCOUNTER — ANESTHESIA (OUTPATIENT)
Dept: SURGERY | Facility: HOSPITAL | Age: 37
End: 2017-04-03
Payer: COMMERCIAL

## 2017-04-03 NOTE — H&P (VIEW-ONLY)
Subjective:       Patient ID: Moris Alfaro is a 36 y.o. female.    Chief Complaint: Migraine  INTERVAL HISTORY  The patient states that headaches are unchanged. Maxalt not working. She was offered Botox but does not wish to proceed. She admits to a lot of stress and fibromyalgia flare ups. She also states that her left side of the body feels weak. This is intermittent, her  will weaken and she will drop her curling iron.  HPI   The patient is a former patient of the late Dr. Cristofer Pink who presents for evaluation of Migraine. She states she has had them for at least 12 years. Onset coincided with treatment with Depo provera. Headaches are located in the frontal region, radiating to the posterior region. The quality is excruciating, throbbing/pounding and sharp.  Intensity varies from mild to moderate and severe. She has more than 15 days affected with headache per month and least 8 attacks that meet criteria for migraine. It is associated with sensitivity to light, noise, and smells, blurred vision, nausea, dizziness, problems with concentration, neck tightness and neck pain. Seldom, she wakes up in the middle of the night with an attack. No ameliorating factors. Worse with light, noise, smells, bending over and raising her head when lying in bed.    Review of Systems   Constitutional: Positive for fatigue. Negative for activity change, appetite change and fever.   HENT: Positive for postnasal drip and rhinorrhea. Negative for congestion, dental problem, hearing loss, sinus pressure, tinnitus, trouble swallowing and voice change.    Eyes: Negative for photophobia, pain, redness and visual disturbance.   Respiratory: Positive for shortness of breath. Negative for cough and chest tightness.    Cardiovascular: Positive for chest pain and palpitations. Negative for leg swelling.   Gastrointestinal: Negative for abdominal pain, blood in stool, nausea and vomiting.   Endocrine: Negative for cold intolerance  and heat intolerance.   Genitourinary: Positive for frequency. Negative for difficulty urinating, menstrual problem and urgency.   Musculoskeletal: Positive for arthralgias, back pain, myalgias and neck pain. Negative for gait problem, joint swelling and neck stiffness.   Skin: Positive for color change and rash.   Neurological: Positive for dizziness and headaches. Negative for tremors, seizures, syncope, facial asymmetry, speech difficulty, weakness, light-headedness and numbness.   Hematological: Negative for adenopathy. Does not bruise/bleed easily.   Psychiatric/Behavioral: Positive for sleep disturbance. Negative for agitation, behavioral problems, confusion, decreased concentration, self-injury and suicidal ideas. The patient is nervous/anxious. The patient is not hyperactive.          Past Medical History   Diagnosis Date    Anxiety     Endometriosis, uterus      uterine bladder and ovaries    Fibromyalgia     Migraine     Migraine headache     Mitral valve prolapse     Seizures     Toe fracture      Past Surgical History   Procedure Laterality Date    Pr exploratory of abdomen      Tubal ligation      Hysterectomy  2007     CORINA due to DUB,  ovaries intact    Ecsi      Lumbar facet injection      Rhizotomy       Family History   Problem Relation Age of Onset    Thyroid disease Mother     Heart disease Father     Hypertension Father     Pancreatitis Father     Diabetes Father     Stomach cancer Paternal Aunt     Stomach cancer Paternal Uncle     Cancer Paternal Uncle      lung cancer    Melanoma Neg Hx     Psoriasis Neg Hx     Lupus Neg Hx     Eczema Neg Hx      Social History     Social History    Marital status:      Spouse name: N/A    Number of children: N/A    Years of education: N/A     Occupational History    Not on file.     Social History Main Topics    Smoking status: Never Smoker    Smokeless tobacco: Never Used    Alcohol use Yes      Comment: OCCASIONALLY     Drug use: No    Sexual activity: Yes     Partners: Male     Other Topics Concern    Not on file     Social History Narrative     Allergies   Allergen Reactions    Metronidazole Hives and Itching       Current Outpatient Prescriptions   Medication Sig    cyclobenzaprine (FLEXERIL) 10 MG tablet Take 10 mg by mouth 2 (two) times daily.     dextroamphetamine-amphetamine (ADDERALL) 20 mg tablet Take 1 tablet by mouth 2 (two) times daily.     diltiaZEM (CARDIZEM CD) 180 MG 24 hr capsule Take 180 mg by mouth once daily.    ergocalciferol (VITAMIN D2) 50,000 unit Cap Take 1 capsule (50,000 Units total) by mouth every 7 days.    gabapentin (NEURONTIN) 800 MG tablet Take 800 mg by mouth 3 (three) times daily.    ibuprofen (ADVIL,MOTRIN) 800 MG tablet Take 800 mg by mouth 3 (three) times daily.    levothyroxine (SYNTHROID) 88 MCG tablet Take 1 tablet (88 mcg total) by mouth once daily.    omeprazole (PRILOSEC) 40 MG capsule TAKE 1 CAPSULE BY MOUTH EVERY DAILY    propranolol (INDERAL LA) 80 MG 24 hr capsule Take 80 mg by mouth once daily.     rizatriptan (MAXALT) 10 MG tablet Take 1 tablet (10 mg total) by mouth daily as needed.    topiramate (TOPAMAX) 50 MG tablet Take 1 tablet (50 mg total) by mouth 2 (two) times daily.    venlafaxine (EFFEXOR-XR) 150 MG Cp24 Take 150 mg by mouth once daily.     zolpidem (AMBIEN CR) 12.5 MG CR tablet Take 1 tablet by mouth nightly as needed.     No current facility-administered medications for this visit.        Objective:      Vitals:    03/21/17 1322   BP: 114/72   Pulse: 82   Resp: 20     Body mass index is 25.58 kg/(m^2).    Physical Exam    Constitutional:   She appears well-developed and well-nourished. She is well groomed    HENT:    Head: Atraumatic, oral and nasal mucosa intact  Eyes: Conjunctivae and EOM are normal. Pupils are equal, round, and reactive to light OU  Neck: Neck supple. No thyromegaly present  Cardiovascular: Normal rate and normal heart sounds  No  murmur heard  Pulmonary/Chest: Effort normal and breath sounds normal  Musculoskeletal: Normal range of motion. No joint stiffness. No vertebral point tenderness  Skin: Skin is warm and dry  Psychiatric: Normal mood and affect     Neuro exam:    Mental status:  Awake, attentive, Alert, oriented to self, place, year and month  Language function is intact  Speech fluency is good and speech is clear  Remote and recent memory are good  No findings to suggest executive dysfuntion    Patient has adequate insight        Cranial Nerves:  Smell was not formally evaluated  Cranial Nerves II - XII: intact  Pursuits were smooth, normal saccades, no nystagmus OU  Motor - facial movement was symmetrical and normal     Palate moved well and was symmetrical with normal palatal and oral sensation  Tongue movements were full    Coordination:     Rapid alternating movements and rapid finger tapping - normal bilaterally  Finger to nose - normal and symmetric bilaterally   Heel to shin test - normal and symmetric bilaterally   Arm roll - smooth and symmetric   No intentional or positional tremor.     Motor:  Normal muscle bulk and symmetry. No fasciculations were noted    Questionable left side pronator drift  Strength 5/5 bilaterally     Reflexes:  Tendon reflexes were 2 + at biceps, triceps, brachioradialis, patellar, and Achilles bilaterally  On plantar stimulation toes were down going bilaterally  No clonus was noted     Sensory: Intact toprimary modalities  Gait: Romberg absent. Normal gait. Normal arm swing and turns. Normal postural reflexes.     Review of Data: I reviewed records as available in the system including encounters, labs (normal CBC, CMP, ESR) US of neck (Normal thyroid), CT head (negative)        Assessment and Plan   Intractable chronic migraine without aura and without status migrainosus. Failure to multiple meds. Now on Topamax, Propranolol, Effexor. Does not wish to proceed with Botox. Maxalt ineffective.  Will  change to Zomig Nasal Spray.  Give away weakness of the left side. Questionable pronator drift. MRI of the brain ordered  Encourage to follow up with psychiatry and psychology  RTC in 3 months    Johanne Singer M.D  Medical Director, Headache and Facial Pain  [facility]

## 2017-04-06 ENCOUNTER — HOSPITAL ENCOUNTER (OUTPATIENT)
Facility: HOSPITAL | Age: 37
Discharge: HOME | End: 2017-04-06
Attending: SPECIALIST | Admitting: SPECIALIST
Payer: COMMERCIAL

## 2017-04-06 VITALS
RESPIRATION RATE: 16 BRPM | TEMPERATURE: 98 F | HEIGHT: 64 IN | OXYGEN SATURATION: 98 % | BODY MASS INDEX: 25.61 KG/M2 | HEART RATE: 57 BPM | DIASTOLIC BLOOD PRESSURE: 59 MMHG | WEIGHT: 150 LBS | SYSTOLIC BLOOD PRESSURE: 112 MMHG

## 2017-04-06 DIAGNOSIS — R10.2 PELVIC PAIN IN FEMALE: ICD-10-CM

## 2017-04-06 DIAGNOSIS — Z09 POSTOP CHECK: Primary | ICD-10-CM

## 2017-04-06 PROCEDURE — 88305 TISSUE EXAM BY PATHOLOGIST: CPT | Performed by: PATHOLOGY

## 2017-04-06 PROCEDURE — 37000009 HC ANESTHESIA EA ADD 15 MINS: Mod: PO | Performed by: SPECIALIST

## 2017-04-06 PROCEDURE — 63600175 PHARM REV CODE 636 W HCPCS: Mod: PO | Performed by: NURSE ANESTHETIST, CERTIFIED REGISTERED

## 2017-04-06 PROCEDURE — 25000003 PHARM REV CODE 250: Mod: PO | Performed by: SPECIALIST

## 2017-04-06 PROCEDURE — 37000008 HC ANESTHESIA 1ST 15 MINUTES: Mod: PO | Performed by: SPECIALIST

## 2017-04-06 PROCEDURE — 27201423 OPTIME MED/SURG SUP & DEVICES STERILE SUPPLY: Mod: PO | Performed by: SPECIALIST

## 2017-04-06 PROCEDURE — 36000709 HC OR TIME LEV III EA ADD 15 MIN: Mod: PO | Performed by: SPECIALIST

## 2017-04-06 PROCEDURE — 71000033 HC RECOVERY, INTIAL HOUR: Mod: PO | Performed by: SPECIALIST

## 2017-04-06 PROCEDURE — 25000003 PHARM REV CODE 250: Mod: PO | Performed by: NURSE ANESTHETIST, CERTIFIED REGISTERED

## 2017-04-06 PROCEDURE — 36000708 HC OR TIME LEV III 1ST 15 MIN: Mod: PO | Performed by: SPECIALIST

## 2017-04-06 PROCEDURE — 25000003 PHARM REV CODE 250: Mod: PO | Performed by: ANESTHESIOLOGY

## 2017-04-06 PROCEDURE — 71000039 HC RECOVERY, EACH ADD'L HOUR: Mod: PO | Performed by: SPECIALIST

## 2017-04-06 PROCEDURE — D9220A PRA ANESTHESIA: Mod: CRNA,,, | Performed by: NURSE ANESTHETIST, CERTIFIED REGISTERED

## 2017-04-06 PROCEDURE — D9220A PRA ANESTHESIA: Mod: ANES,,, | Performed by: ANESTHESIOLOGY

## 2017-04-06 PROCEDURE — 63600175 PHARM REV CODE 636 W HCPCS: Mod: PO | Performed by: ANESTHESIOLOGY

## 2017-04-06 PROCEDURE — 58661 LAPAROSCOPY REMOVE ADNEXA: CPT | Mod: ,,, | Performed by: SPECIALIST

## 2017-04-06 PROCEDURE — 63600175 PHARM REV CODE 636 W HCPCS: Mod: PO | Performed by: SPECIALIST

## 2017-04-06 PROCEDURE — 88305 TISSUE EXAM BY PATHOLOGIST: CPT | Mod: 26,,, | Performed by: PATHOLOGY

## 2017-04-06 RX ORDER — DEXAMETHASONE SODIUM PHOSPHATE 4 MG/ML
8 INJECTION, SOLUTION INTRA-ARTICULAR; INTRALESIONAL; INTRAMUSCULAR; INTRAVENOUS; SOFT TISSUE
Status: COMPLETED | OUTPATIENT
Start: 2017-04-06 | End: 2017-04-06

## 2017-04-06 RX ORDER — DEXTROSE, SODIUM CHLORIDE, SODIUM LACTATE, POTASSIUM CHLORIDE, AND CALCIUM CHLORIDE 5; .6; .31; .03; .02 G/100ML; G/100ML; G/100ML; G/100ML; G/100ML
INJECTION, SOLUTION INTRAVENOUS CONTINUOUS
Status: DISCONTINUED | OUTPATIENT
Start: 2017-04-06 | End: 2017-04-06

## 2017-04-06 RX ORDER — FENTANYL CITRATE 50 UG/ML
INJECTION, SOLUTION INTRAMUSCULAR; INTRAVENOUS
Status: DISCONTINUED | OUTPATIENT
Start: 2017-04-06 | End: 2017-04-06

## 2017-04-06 RX ORDER — OXYCODONE HYDROCHLORIDE 5 MG/1
5 TABLET ORAL ONCE AS NEEDED
Status: COMPLETED | OUTPATIENT
Start: 2017-04-06 | End: 2017-04-06

## 2017-04-06 RX ORDER — PROPOFOL 10 MG/ML
VIAL (ML) INTRAVENOUS
Status: DISCONTINUED | OUTPATIENT
Start: 2017-04-06 | End: 2017-04-06

## 2017-04-06 RX ORDER — NEOSTIGMINE METHYLSULFATE 1 MG/ML
INJECTION, SOLUTION INTRAVENOUS
Status: DISCONTINUED | OUTPATIENT
Start: 2017-04-06 | End: 2017-04-06

## 2017-04-06 RX ORDER — ACETAMINOPHEN 10 MG/ML
INJECTION, SOLUTION INTRAVENOUS
Status: DISCONTINUED | OUTPATIENT
Start: 2017-04-06 | End: 2017-04-06

## 2017-04-06 RX ORDER — KETOROLAC TROMETHAMINE 30 MG/ML
INJECTION, SOLUTION INTRAMUSCULAR; INTRAVENOUS
Status: DISCONTINUED | OUTPATIENT
Start: 2017-04-06 | End: 2017-04-06

## 2017-04-06 RX ORDER — GLYCOPYRROLATE 0.2 MG/ML
INJECTION INTRAMUSCULAR; INTRAVENOUS
Status: DISCONTINUED | OUTPATIENT
Start: 2017-04-06 | End: 2017-04-06

## 2017-04-06 RX ORDER — BUPIVACAINE HYDROCHLORIDE AND EPINEPHRINE 2.5; 5 MG/ML; UG/ML
INJECTION, SOLUTION EPIDURAL; INFILTRATION; INTRACAUDAL; PERINEURAL
Status: DISCONTINUED | OUTPATIENT
Start: 2017-04-06 | End: 2017-04-06 | Stop reason: HOSPADM

## 2017-04-06 RX ORDER — KETOROLAC TROMETHAMINE 30 MG/ML
30 INJECTION, SOLUTION INTRAMUSCULAR; INTRAVENOUS EVERY 6 HOURS
Status: DISCONTINUED | OUTPATIENT
Start: 2017-04-06 | End: 2017-04-06 | Stop reason: HOSPADM

## 2017-04-06 RX ORDER — OXYCODONE AND ACETAMINOPHEN 10; 325 MG/1; MG/1
1 TABLET ORAL EVERY 6 HOURS PRN
Qty: 20 TABLET | Refills: 0 | Status: SHIPPED | OUTPATIENT
Start: 2017-04-06 | End: 2017-04-20

## 2017-04-06 RX ORDER — LIDOCAINE HYDROCHLORIDE 10 MG/ML
1 INJECTION, SOLUTION EPIDURAL; INFILTRATION; INTRACAUDAL; PERINEURAL ONCE
Status: COMPLETED | OUTPATIENT
Start: 2017-04-06 | End: 2017-04-06

## 2017-04-06 RX ORDER — MIDAZOLAM HYDROCHLORIDE 1 MG/ML
INJECTION, SOLUTION INTRAMUSCULAR; INTRAVENOUS
Status: DISCONTINUED | OUTPATIENT
Start: 2017-04-06 | End: 2017-04-06

## 2017-04-06 RX ORDER — KETAMINE HYDROCHLORIDE 50 MG/ML
INJECTION, SOLUTION INTRAMUSCULAR; INTRAVENOUS
Status: DISCONTINUED | OUTPATIENT
Start: 2017-04-06 | End: 2017-04-06

## 2017-04-06 RX ORDER — SODIUM CHLORIDE 0.9 % (FLUSH) 0.9 %
3 SYRINGE (ML) INJECTION
Status: DISCONTINUED | OUTPATIENT
Start: 2017-04-06 | End: 2017-04-06 | Stop reason: HOSPADM

## 2017-04-06 RX ORDER — ONDANSETRON 8 MG/1
8 TABLET, ORALLY DISINTEGRATING ORAL EVERY 8 HOURS PRN
Status: DISCONTINUED | OUTPATIENT
Start: 2017-04-06 | End: 2017-04-06 | Stop reason: HOSPADM

## 2017-04-06 RX ORDER — ONDANSETRON 2 MG/ML
INJECTION INTRAMUSCULAR; INTRAVENOUS
Status: DISCONTINUED | OUTPATIENT
Start: 2017-04-06 | End: 2017-04-06

## 2017-04-06 RX ORDER — IBUPROFEN 400 MG/1
800 TABLET ORAL EVERY 8 HOURS
Status: DISCONTINUED | OUTPATIENT
Start: 2017-04-07 | End: 2017-04-06 | Stop reason: HOSPADM

## 2017-04-06 RX ORDER — LIDOCAINE HYDROCHLORIDE 10 MG/ML
1 INJECTION, SOLUTION EPIDURAL; INFILTRATION; INTRACAUDAL; PERINEURAL ONCE AS NEEDED
Status: DISCONTINUED | OUTPATIENT
Start: 2017-04-06 | End: 2017-04-06 | Stop reason: HOSPADM

## 2017-04-06 RX ORDER — FENTANYL CITRATE 50 UG/ML
25 INJECTION, SOLUTION INTRAMUSCULAR; INTRAVENOUS EVERY 5 MIN PRN
Status: COMPLETED | OUTPATIENT
Start: 2017-04-06 | End: 2017-04-06

## 2017-04-06 RX ORDER — DEXAMETHASONE SODIUM PHOSPHATE 4 MG/ML
8 INJECTION, SOLUTION INTRA-ARTICULAR; INTRALESIONAL; INTRAMUSCULAR; INTRAVENOUS; SOFT TISSUE
Status: DISCONTINUED | OUTPATIENT
Start: 2017-04-06 | End: 2017-04-06 | Stop reason: HOSPADM

## 2017-04-06 RX ORDER — SODIUM CHLORIDE, SODIUM LACTATE, POTASSIUM CHLORIDE, CALCIUM CHLORIDE 600; 310; 30; 20 MG/100ML; MG/100ML; MG/100ML; MG/100ML
INJECTION, SOLUTION INTRAVENOUS CONTINUOUS
Status: DISCONTINUED | OUTPATIENT
Start: 2017-04-06 | End: 2017-04-06

## 2017-04-06 RX ORDER — SODIUM CHLORIDE, SODIUM LACTATE, POTASSIUM CHLORIDE, CALCIUM CHLORIDE 600; 310; 30; 20 MG/100ML; MG/100ML; MG/100ML; MG/100ML
INJECTION, SOLUTION INTRAVENOUS CONTINUOUS
Status: DISCONTINUED | OUTPATIENT
Start: 2017-04-06 | End: 2017-04-06 | Stop reason: HOSPADM

## 2017-04-06 RX ORDER — LIDOCAINE HCL/PF 100 MG/5ML
SYRINGE (ML) INTRAVENOUS
Status: DISCONTINUED | OUTPATIENT
Start: 2017-04-06 | End: 2017-04-06

## 2017-04-06 RX ORDER — ROCURONIUM BROMIDE 10 MG/ML
INJECTION, SOLUTION INTRAVENOUS
Status: DISCONTINUED | OUTPATIENT
Start: 2017-04-06 | End: 2017-04-06

## 2017-04-06 RX ADMIN — ACETAMINOPHEN 1000 MG: 10 INJECTION, SOLUTION INTRAVENOUS at 07:04

## 2017-04-06 RX ADMIN — FENTANYL CITRATE 25 MCG: 50 INJECTION INTRAMUSCULAR; INTRAVENOUS at 08:04

## 2017-04-06 RX ADMIN — SODIUM CHLORIDE, SODIUM LACTATE, POTASSIUM CHLORIDE, AND CALCIUM CHLORIDE: .6; .31; .03; .02 INJECTION, SOLUTION INTRAVENOUS at 06:04

## 2017-04-06 RX ADMIN — PROPOFOL 150 MG: 10 INJECTION, EMULSION INTRAVENOUS at 07:04

## 2017-04-06 RX ADMIN — MIDAZOLAM HYDROCHLORIDE 2 MG: 1 INJECTION, SOLUTION INTRAMUSCULAR; INTRAVENOUS at 06:04

## 2017-04-06 RX ADMIN — KETOROLAC TROMETHAMINE 30 MG: 30 INJECTION, SOLUTION INTRAMUSCULAR; INTRAVENOUS at 07:04

## 2017-04-06 RX ADMIN — DEXAMETHASONE SODIUM PHOSPHATE 8 MG: 4 INJECTION, SOLUTION INTRAMUSCULAR; INTRAVENOUS at 06:04

## 2017-04-06 RX ADMIN — NEOSTIGMINE METHYLSULFATE 3 MG: 1 INJECTION INTRAVENOUS at 07:04

## 2017-04-06 RX ADMIN — LIDOCAINE HYDROCHLORIDE: 10 INJECTION, SOLUTION EPIDURAL; INFILTRATION; INTRACAUDAL; PERINEURAL at 06:04

## 2017-04-06 RX ADMIN — GLYCOPYRROLATE 0.4 MG: 0.2 INJECTION, SOLUTION INTRAMUSCULAR; INTRAVENOUS at 07:04

## 2017-04-06 RX ADMIN — OXYCODONE HYDROCHLORIDE 5 MG: 5 TABLET ORAL at 08:04

## 2017-04-06 RX ADMIN — ROCURONIUM BROMIDE 25 MG: 10 INJECTION, SOLUTION INTRAVENOUS at 07:04

## 2017-04-06 RX ADMIN — LIDOCAINE HYDROCHLORIDE 75 MG: 20 INJECTION PARENTERAL at 07:04

## 2017-04-06 RX ADMIN — FENTANYL CITRATE 50 MCG: 50 INJECTION, SOLUTION INTRAMUSCULAR; INTRAVENOUS at 07:04

## 2017-04-06 RX ADMIN — DEXTROSE 2 G: 50 INJECTION, SOLUTION INTRAVENOUS at 06:04

## 2017-04-06 RX ADMIN — KETAMINE HYDROCHLORIDE 25 MG: 50 INJECTION INTRAMUSCULAR; INTRAVENOUS at 07:04

## 2017-04-06 RX ADMIN — ONDANSETRON 4 MG: 2 INJECTION, SOLUTION INTRAMUSCULAR; INTRAVENOUS at 07:04

## 2017-04-06 RX ADMIN — SODIUM CHLORIDE, SODIUM LACTATE, POTASSIUM CHLORIDE, AND CALCIUM CHLORIDE: .6; .31; .03; .02 INJECTION, SOLUTION INTRAVENOUS at 08:04

## 2017-04-06 NOTE — H&P (VIEW-ONLY)
36 yo BF presents for preoperative evaluation for proposed Dx laparoscope after recent pelvic u/s reveals left complex cystic mass.  Pelvic u/s as follows...Narrative   The Uterus is not visualized consistent with hysterectomy.    The right ovary of 2.6 x 1.6 x 1.8 cm is noted    The left ovary measures 8.2 x 7.7 x 3.6 cm and demonstrates a multiloculated septated cystic structure of 6.1 x 7.5 x 3.3 cm.     This could relate to a dilated fallopian tube twisted upon itself or a cystic mass of the ovary which be worrisome for a neoplastic process.     Female pelvis MRI evaluation with and without contrast may be helpful for further evaluation. A neoplastic cystic ovarian mass cannot be excluded and further evaluation is necessary.    Some free pelvic fluid is noted which would be common in a premenopausal patient   Impression       1. Multiloculated cystic mass in the region of the left ovary. A cystic ovarian neoplasm cannot be excluded, this could less likely relate to a dilated fallopian tube residual from the patient's hysterectomy. Female pelvis MRI evaluation would be helpful for further evaluation/follow up.      Electronically signed by: Elieser Chacon MD  Date: 03/15/17  Time: 12:34      Past Medical History:   Diagnosis Date    Anxiety     Endometriosis, uterus     uterine bladder and ovaries    Fibromyalgia     Migraine     Migraine headache     Mitral valve prolapse     Seizures     Toe fracture        Past Surgical History:   Procedure Laterality Date    ecsi      HYSTERECTOMY  2007    CORINA due to DUB,  ovaries intact    lumbar facet injection      MI EXPLORATORY OF ABDOMEN      RHIZOTOMY      TUBAL LIGATION         Family History   Problem Relation Age of Onset    Thyroid disease Mother     Heart disease Father     Hypertension Father     Pancreatitis Father     Diabetes Father     Stomach cancer Paternal Aunt     Stomach cancer Paternal Uncle     Cancer Paternal Uncle      lung cancer     Melanoma Neg Hx     Psoriasis Neg Hx     Lupus Neg Hx     Eczema Neg Hx        Social History     Social History    Marital status: Legally      Spouse name: N/A    Number of children: N/A    Years of education: N/A     Social History Main Topics    Smoking status: Never Smoker    Smokeless tobacco: Never Used    Alcohol use Yes      Comment: OCCASIONALLY    Drug use: No    Sexual activity: Yes     Partners: Male     Birth control/ protection: None     Other Topics Concern    None     Social History Narrative       Current Outpatient Prescriptions   Medication Sig Dispense Refill    cyclobenzaprine (FLEXERIL) 10 MG tablet Take 10 mg by mouth 2 (two) times daily.       dextroamphetamine-amphetamine (ADDERALL) 20 mg tablet Take 1 tablet by mouth 2 (two) times daily.       diltiaZEM (CARDIZEM CD) 180 MG 24 hr capsule Take 180 mg by mouth once daily.      ergocalciferol (VITAMIN D2) 50,000 unit Cap Take 1 capsule (50,000 Units total) by mouth every 7 days. 12 capsule 3    gabapentin (NEURONTIN) 800 MG tablet Take 800 mg by mouth 3 (three) times daily.  3    levothyroxine (SYNTHROID) 88 MCG tablet Take 1 tablet (88 mcg total) by mouth once daily. 90 tablet 3    omeprazole (PRILOSEC) 40 MG capsule TAKE 1 CAPSULE BY MOUTH EVERY DAILY 90 capsule 3    propranolol (INDERAL LA) 80 MG 24 hr capsule Take 1 capsule (80 mg total) by mouth once daily. 30 capsule 4    topiramate (TOPAMAX) 50 MG tablet Take 1 tablet (50 mg total) by mouth 2 (two) times daily. 60 tablet 11    venlafaxine (EFFEXOR-XR) 150 MG Cp24 Take 150 mg by mouth once daily.       zolpidem (AMBIEN CR) 12.5 MG CR tablet Take 1 tablet by mouth nightly as needed.  3    ibuprofen (ADVIL,MOTRIN) 800 MG tablet Take 800 mg by mouth 3 (three) times daily.      zolmitriptan (ZOMIG) 5 mg Spry 1 spray by Nasal route once as needed. 6 each 3     No current facility-administered medications for this visit.        Review of patient's  allergies indicates:   Allergen Reactions    Metronidazole Hives and Itching     PE:   APPEARANCE: Well nourished, well developed, in no acute distress.  NECK: Neck symmetric without masses or thyromegaly.  NODES: No inguinal lymph node enlargement.  ABDOMEN: Soft. No tenderness or masses. No hepatosplenomegaly. No hernias.  BREASTS: Symmetrical, no skin changes or visible lesions. No palpable masses, nipple discharge or adenopathy bilaterally.  PELVIC: Normal external female genitalia without lesions. Normal hair distribution. Adequate perineal body, normal urethral meatus. Vagina moist and well rugated without lesions or discharge. No significant cystocele or rectocele. Uterus and cervix surgically absent. Bimanual exam reveals adnexal fullness left    I discussed the risks and benefits of dx laparoscope. I discussed risks of bowel/bladder /ureteral injury, need for open procedure, infection, bleeding, prolonged recovery.  Consents and orders obtained  Will proceed ASU 4/3/17 8 am

## 2017-04-06 NOTE — ANESTHESIA PREPROCEDURE EVALUATION
04/06/2017  Moris Alfaro is a 37 y.o., female.    [facility] Anesthesia Evaluation      I have reviewed the Medications.     Review of Systems  Anesthesia Hx:  No problems with previous Anesthesia    Social:  Non-Smoker, No Alcohol Use    Cardiovascular:   Valvular problems/Murmurs, MVP hyperlipidemia    Pulmonary:  Pulmonary Normal    Renal/:  Renal/ Normal     Hepatic/GI:  Hepatic/GI Normal    Neurological:   Headaches (migraines) Seizures (pseudoseizures)   Chronic Pain Syndrome (fibromyalgia)   Endocrine:   Diabetes Hypothyroidism    Psych:   anxiety          Physical Exam  General:  Well nourished    Airway/Jaw/Neck:  Airway Findings: Mouth Opening: Small, but > 3cm Tongue: Normal  General Airway Assessment: Adult, Average  Mallampati: III  Jaw/Neck Findings:  Neck ROM: Normal ROM       Chest/Lungs:  Chest/Lungs Findings: Clear to auscultation, Normal Respiratory Rate     Heart/Vascular:  Heart Findings: Rate: Normal  Rhythm: Regular Rhythm  Sounds: Normal  Heart murmur: negative       Mental Status:  Mental Status Findings:  Cooperative, Alert and Oriented, Flat Affect         Anesthesia Plan  Type of Anesthesia, risks & benefits discussed:  Anesthesia Type:  general  Patient's Preference:   Intra-op Monitoring Plan:   Intra-op Monitoring Plan Comments:   Post Op Pain Control Plan:   Post Op Pain Control Plan Comments:   Induction:   IV  Beta Blocker:  Patient is on a Beta-Blocker and has received one dose within the past 24 hours (No further documentation required).       Informed Consent: Patient understands risks and agrees with Anesthesia plan.  Questions answered. Anesthesia consent signed with patient.  ASA Score: 2     Day of Surgery Review of History & Physical:            Ready For Surgery From Anesthesia Perspective.

## 2017-04-06 NOTE — DISCHARGE INSTRUCTIONS
LAPAROSCOPY/TUBAL LIGATION    DOS:   Minimal activity for 48 hours.    Advance diet as tolerated   Expect some irregular menstrual bleeding.   Keep incisions clean.   Remove dressing in 48 hours   Showers only for _1_ weeks and pat incisions dry.   Resume home medications as prescribed    DONT:   No lifting more than 15 pounds for _2_ weeks.    Nothing in the vagina for _2_ weeks. No tampons, douching or intercourse.   No driving for 24 hours or while taking narcotic pain medication   Before driving, be sure you can press the brakes all the way to the floor without difficulty or pain.   DO NOT TAKE ADDITIONAL TYLENOL/ACETAMINOPHEN WHILE TAKING NARCOTIC PAIN MEDICATION THAT CONTAINS TYLENOL/ACETAMINOPHEN.    CALL PHYSICIAN FOR:   Heavy vaginal bleeding.   Fever>100.5   Persistent nausea and vomiting lasting > 12 hours   Redness, swelling, or drainage from incisions   Abdominal pain not improving day to day.    Make return appointment for 2 weeks at [phone]      Discharge Instructions: After Your Surgery  Youve just had surgery. During surgery you were given medicine called anesthesia to keep you relaxed and free of pain. After surgery you may have some pain or nausea. This is common. Here are some tips for feeling better and getting well after surgery.     Stay on schedule with your medication.   Going home  Your doctor or nurse will show you how to take care of yourself when you go home. He or she will also answer your questions. Have an adult family member or friend drive you home. For the first 24 hours after your surgery:  · Do not drive or use heavy equipment.  · Do not make important decisions or sign legal papers.  · Do not drink alcohol.  · Have someone stay with you, if needed. He or she can watch for problems and help keep you safe.  Be sure to go to all follow-up visits with your doctor. And rest after your surgery for as long as your doctor tells you to.  Coping with  pain  If you have pain after surgery, pain medicine will help you feel better. Take it as told, before pain becomes severe. Also, ask your doctor or pharmacist about other ways to control pain. This might be with heat, ice, or relaxation. And follow any other instructions your surgeon or nurse gives you.  Tips for taking pain medicine  To get the best relief possible, remember these points:  · Pain medicines can upset your stomach. Taking them with a little food may help.  · Most pain relievers taken by mouth need at least 20 to 30 minutes to start to work.  · Taking medicine on a schedule can help you remember to take it. Try to time your medicine so that you can take it before starting an activity. This might be before you get dressed, go for a walk, or sit down for dinner.  · Constipation is a common side effect of pain medicines. Call your doctor before taking any medicines such as laxatives or stool softeners to help ease constipation. Also ask if you should skip any foods. Drinking lots of fluids and eating foods such as fruits and vegetables that are high in fiber can also help. Remember, do not take laxatives unless your surgeon has prescribed them.  · Drinking alcohol and taking pain medicine can cause dizziness and slow your breathing. It can even be deadly. Do not drink alcohol while taking pain medicine.  · Pain medicine can make you react more slowly to things. Do not drive or run machinery while taking pain medicine.  Your health care provider may tell you to take acetaminophen to help ease your pain. Ask him or her how much you are supposed to take each day. Acetaminophen or other pain relievers may interact with your prescription medicines or other over-the-counter (OTC) drugs. Some prescription medicines have acetaminophen and other ingredients. Using both prescription and OTC acetaminophen for pain can cause you to overdose. Read the labels on your OTC medicines with care. This will help you to  clearly know the list of ingredients, how much to take, and any warnings. It may also help you not take too much acetaminophen. If you have questions or do not understand the information, ask your pharmacist or health care provider to explain it to you before you take the OTC medicine.  Managing nausea  Some people have an upset stomach after surgery. This is often because of anesthesia, pain, or pain medicine, or the stress of surgery. These tips will help you handle nausea and eat healthy foods as you get better. If you were on a special food plan before surgery, ask your doctor if you should follow it while you get better. These tips may help:  · Do not push yourself to eat. Your body will tell you when to eat and how much.  · Start off with clear liquids and soup. They are easier to digest.  · Next try semi-solid foods, such as mashed potatoes, applesauce, and gelatin, as you feel ready.  · Slowly move to solid foods. Dont eat fatty, rich, or spicy foods at first.  · Do not force yourself to have 3 large meals a day. Instead eat smaller amounts more often.  · Take pain medicines with a small amount of solid food, such as crackers or toast, to avoid nausea.     Call your surgeon if  · You still have pain an hour after taking medicine. The medicine may not be strong enough.  · You feel too sleepy, dizzy, or groggy. The medicine may be too strong.  · You have side effects like nausea, vomiting, or skin changes, such as rash, itching, or hives.       If you have obstructive sleep apnea  You were given anesthesia medicine during surgery to keep you comfortable and free of pain. After surgery, you may have more apnea spells because of this medicine and other medicines you were given. The spells may last longer than usual.   At home:  · Keep using the continuous positive airway pressure (CPAP) device when you sleep. Unless your health care provider tells you not to, use it when you sleep, day or night. CPAP is a  common device used to treat obstructive sleep apnea.  · Talk with your provider before taking any pain medicine, muscle relaxants, or sedatives. Your provider will tell you about the possible dangers of taking these medicines.  Date Last Reviewed: 10/16/2014  © 3945-1012 "[employer]". [address] [place] [address]. All rights reserved. This information is not intended as a substitute for professional medical care. Always follow your healthcare professional's instructions.

## 2017-04-06 NOTE — IP AVS SNAPSHOT
[facility]  [address]  [place] [address]  Phone: [phone]           Patient Discharge Instructions   Our goal is to set you up for success. This packet includes information on your condition, medications, and your home care.  It will help you care for yourself to prevent having to return to the hospital.     Please ask your nurse if you have any questions.      There are many details to remember when preparing to leave the hospital. Here is what you will need to do:    1. Take your medicine. If you are prescribed medications, review your Medication List on the following pages. You may have new medications to  at the pharmacy and others that you'll need to stop taking. Review the instructions for how and when to take your medications. Talk with your doctor or nurses if you are unsure of what to do.     2. Go to your follow-up appointments. Specific follow-up information is listed in the following pages. Your may be contacted by a nurse or clinical provider about future appointments. Be sure we have all of the phone numbers to reach you. Please contact your provider's office if you are unable to make an appointment.     3. Watch for warning signs. Your doctor or nurse will give you detailed warning signs to watch for and when to call for assistance. These instructions may also include educational information about your condition. If you experience any of warning signs to your health, call your doctor.           [facility]  Unless otherwise directed by your provider, please   contact [facility], our nurse care line   that is available for 24/7 assistance.     [phone] (toll-free)     Registered nurses in the [facility]   provide: appointment scheduling, clinical advisement, health education, and other advisory services.                  ** Verify the list of medication(s) below is accurate and up to date. Carry this with you in case of  emergency. If your medications have changed, please notify your healthcare provider.             Medication List      START taking these medications        Additional Info                      oxycodone-acetaminophen  mg per tablet   Commonly known as:  PERCOCET   Quantity:  20 tablet   Refills:  0   Dose:  1 tablet    Instructions:  Take 1 tablet by mouth every 6 (six) hours as needed for Pain.     Begin Date    AM    Noon    PM    Bedtime         ASK your doctor about these medications        Additional Info                      cyclobenzaprine 10 MG tablet   Commonly known as:  FLEXERIL   Refills:  0   Dose:  10 mg    Instructions:  Take 10 mg by mouth 2 (two) times daily.     Begin Date    AM    Noon    PM    Bedtime       dextroamphetamine-amphetamine 20 mg tablet   Commonly known as:  ADDERALL   Refills:  0   Dose:  1 tablet    Instructions:  Take 1 tablet by mouth 2 (two) times daily.     Begin Date    AM    Noon    PM    Bedtime       diltiaZEM 180 MG 24 hr capsule   Commonly known as:  CARDIZEM CD   Refills:  0   Dose:  180 mg    Instructions:  Take 180 mg by mouth once daily.     Begin Date    AM    Noon    PM    Bedtime       ergocalciferol 50,000 unit Cap   Commonly known as:  VITAMIN D2   Quantity:  12 capsule   Refills:  3   Dose:  32606 Units    Instructions:  Take 1 capsule (50,000 Units total) by mouth every 7 days.     Begin Date    AM    Noon    PM    Bedtime       gabapentin 800 MG tablet   Commonly known as:  NEURONTIN   Refills:  3   Dose:  800 mg    Instructions:  Take 800 mg by mouth 3 (three) times daily.     Begin Date    AM    Noon    PM    Bedtime       ibuprofen 800 MG tablet   Commonly known as:  ADVIL,MOTRIN   Refills:  0   Dose:  800 mg    Instructions:  Take 800 mg by mouth 3 (three) times daily.     Begin Date    AM    Noon    PM    Bedtime       levothyroxine 88 MCG tablet   Commonly known as:  SYNTHROID   Quantity:  90 tablet   Refills:  3   Dose:  88 mcg    Instructions:   Take 1 tablet (88 mcg total) by mouth once daily.     Begin Date    AM    Noon    PM    Bedtime       omeprazole 40 MG capsule   Commonly known as:  PRILOSEC   Quantity:  90 capsule   Refills:  3    Instructions:  TAKE 1 CAPSULE BY MOUTH EVERY DAILY     Begin Date    AM    Noon    PM    Bedtime       propranolol 80 MG 24 hr capsule   Commonly known as:  INDERAL LA   Quantity:  30 capsule   Refills:  4   Dose:  80 mg    Instructions:  Take 1 capsule (80 mg total) by mouth once daily.     Begin Date    AM    Noon    PM    Bedtime       topiramate 50 MG tablet   Commonly known as:  TOPAMAX   Quantity:  60 tablet   Refills:  11   Dose:  50 mg   Indications:  Migraine Prevention    Instructions:  Take 1 tablet (50 mg total) by mouth 2 (two) times daily.     Begin Date    AM    Noon    PM    Bedtime       venlafaxine 150 MG Cp24   Commonly known as:  EFFEXOR-XR   Refills:  0   Dose:  150 mg    Instructions:  Take 150 mg by mouth once daily.     Begin Date    AM    Noon    PM    Bedtime       zolmitriptan 5 mg Spry   Commonly known as:  ZOMIG   Quantity:  6 each   Refills:  3   Dose:  1 spray    Instructions:  1 spray by Nasal route once as needed.     Begin Date    AM    Noon    PM    Bedtime       zolpidem 12.5 MG CR tablet   Commonly known as:  AMBIEN CR   Refills:  3   Dose:  1 tablet    Instructions:  Take 1 tablet by mouth nightly as needed.     Begin Date    AM    Noon    PM    Bedtime            Where to Get Your Medications      These medications were sent to [facility] [id number]  [place] - [address] AT [facility]  [address] [address]     Phone:  [phone]     oxycodone-acetaminophen  mg per tablet                  Please bring to all follow up appointments:    1. A copy of your discharge instructions.  2. All medicines you are currently taking in their original bottles.  3. Identification and insurance card.    Please arrive 15 minutes ahead of  scheduled appointment time.    Please call 24 hours in advance if you must reschedule your appointment and/or time.        Your Scheduled Appointments     Jun 13, 2017  4:30 PM CDT   Established Patient with MD Kerri Adams - Endo/Diabetes ([facility])    [address] [place] [address]   [phone]            Jun 28, 2017 10:30 AM CDT   Established Patient Visit with Stephanie Singer MD   [place] - Neurology ([facility] [place])    [address] [facility] [address] [place] [address] [phone]              Follow-up Information     Follow up with Chris Hernandez MD In 2 weeks.    Specialties:  Obstetrics, Obstetrics and Gynecology    Contact information:    [address] [facility]  [address]  [facility] [address]  [phone]          Discharge Instructions     Future Orders    Diet general     Questions:    Total calories:      Fat restriction, if any:      Protein restriction, if any:      Na restriction, if any:      Fluid restriction:      Additional restrictions:          Discharge Instructions         LAPAROSCOPY/TUBAL LIGATION    DOS:   Minimal activity for 48 hours.    Advance diet as tolerated   Expect some irregular menstrual bleeding.   Keep incisions clean.   Remove dressing in 48 hours   Showers only for _1_ weeks and pat incisions dry.   Resume home medications as prescribed    DONT:   No lifting more than 15 pounds for _2_ weeks.    Nothing in the vagina for _2_ weeks. No tampons, douching or intercourse.   No driving for 24 hours or while taking narcotic pain medication   Before driving, be sure you can press the brakes all the way to the floor without difficulty or pain.   DO NOT TAKE ADDITIONAL TYLENOL/ACETAMINOPHEN WHILE TAKING NARCOTIC PAIN MEDICATION THAT CONTAINS TYLENOL/ACETAMINOPHEN.    CALL PHYSICIAN FOR:   Heavy vaginal bleeding.   Fever>100.5   Persistent nausea and vomiting lasting > 12 hours   Redness, swelling, or drainage from  incisions   Abdominal pain not improving day to day.    Make return appointment for 2 weeks at [phone]      Discharge Instructions: After Your Surgery  Youve just had surgery. During surgery you were given medicine called anesthesia to keep you relaxed and free of pain. After surgery you may have some pain or nausea. This is common. Here are some tips for feeling better and getting well after surgery.     Stay on schedule with your medication.   Going home  Your doctor or nurse will show you how to take care of yourself when you go home. He or she will also answer your questions. Have an adult family member or friend drive you home. For the first 24 hours after your surgery:  · Do not drive or use heavy equipment.  · Do not make important decisions or sign legal papers.  · Do not drink alcohol.  · Have someone stay with you, if needed. He or she can watch for problems and help keep you safe.  Be sure to go to all follow-up visits with your doctor. And rest after your surgery for as long as your doctor tells you to.  Coping with pain  If you have pain after surgery, pain medicine will help you feel better. Take it as told, before pain becomes severe. Also, ask your doctor or pharmacist about other ways to control pain. This might be with heat, ice, or relaxation. And follow any other instructions your surgeon or nurse gives you.  Tips for taking pain medicine  To get the best relief possible, remember these points:  · Pain medicines can upset your stomach. Taking them with a little food may help.  · Most pain relievers taken by mouth need at least 20 to 30 minutes to start to work.  · Taking medicine on a schedule can help you remember to take it. Try to time your medicine so that you can take it before starting an activity. This might be before you get dressed, go for a walk, or sit down for dinner.  · Constipation is a common side effect of pain medicines. Call your doctor before taking any  medicines such as laxatives or stool softeners to help ease constipation. Also ask if you should skip any foods. Drinking lots of fluids and eating foods such as fruits and vegetables that are high in fiber can also help. Remember, do not take laxatives unless your surgeon has prescribed them.  · Drinking alcohol and taking pain medicine can cause dizziness and slow your breathing. It can even be deadly. Do not drink alcohol while taking pain medicine.  · Pain medicine can make you react more slowly to things. Do not drive or run machinery while taking pain medicine.  Your health care provider may tell you to take acetaminophen to help ease your pain. Ask him or her how much you are supposed to take each day. Acetaminophen or other pain relievers may interact with your prescription medicines or other over-the-counter (OTC) drugs. Some prescription medicines have acetaminophen and other ingredients. Using both prescription and OTC acetaminophen for pain can cause you to overdose. Read the labels on your OTC medicines with care. This will help you to clearly know the list of ingredients, how much to take, and any warnings. It may also help you not take too much acetaminophen. If you have questions or do not understand the information, ask your pharmacist or health care provider to explain it to you before you take the OTC medicine.  Managing nausea  Some people have an upset stomach after surgery. This is often because of anesthesia, pain, or pain medicine, or the stress of surgery. These tips will help you handle nausea and eat healthy foods as you get better. If you were on a special food plan before surgery, ask your doctor if you should follow it while you get better. These tips may help:  · Do not push yourself to eat. Your body will tell you when to eat and how much.  · Start off with clear liquids and soup. They are easier to digest.  · Next try semi-solid foods, such as mashed potatoes, applesauce, and  gelatin, as you feel ready.  · Slowly move to solid foods. Dont eat fatty, rich, or spicy foods at first.  · Do not force yourself to have 3 large meals a day. Instead eat smaller amounts more often.  · Take pain medicines with a small amount of solid food, such as crackers or toast, to avoid nausea.     Call your surgeon if  · You still have pain an hour after taking medicine. The medicine may not be strong enough.  · You feel too sleepy, dizzy, or groggy. The medicine may be too strong.  · You have side effects like nausea, vomiting, or skin changes, such as rash, itching, or hives.       If you have obstructive sleep apnea  You were given anesthesia medicine during surgery to keep you comfortable and free of pain. After surgery, you may have more apnea spells because of this medicine and other medicines you were given. The spells may last longer than usual.   At home:  · Keep using the continuous positive airway pressure (CPAP) device when you sleep. Unless your health care provider tells you not to, use it when you sleep, day or night. CPAP is a common device used to treat obstructive sleep apnea.  · Talk with your provider before taking any pain medicine, muscle relaxants, or sedatives. Your provider will tell you about the possible dangers of taking these medicines.  Date Last Reviewed: 10/16/2014  © 9691-2324 [employer]. [address] [place] [address]. All rights reserved. This information is not intended as a substitute for professional medical care. Always follow your healthcare professional's instructions.              Admission Information     Date & Time Provider Department CSN    4/6/2017  5:56 AM Chris Hernandez MD [facility] [id number]      Care Providers     Provider Role Specialty Primary office phone    Chris Hernandez MD Attending Provider Obstetrics [phone]    Chris Hernandez MD Surgeon  Obstetrics [phone]      Your Vitals Were     BP  "Pulse Temp Resp Height Weight    104/58 (BP Location: Left arm, Patient Position: Lying, BP Method: Automatic) 60 97.9 °F (36.6 °C) (Skin) 16 5' 4" (1.626 m) 68 kg (150 lb)    SpO2 BMI             100% 25.75 kg/m2         Recent Lab Values        7/28/2015 9/29/2015 12/15/2015 4/19/2016 9/26/2016 1/5/2017           10:13 AM 10:50 AM 10:23 AM 10:14 AM  9:04 AM  1:06 PM      A1C 6.5 (H) 5.7 5.7 5.4 5.4 5.4      Comment for A1C at  9:04 AM on 9/26/2016:  According to ADA guidelines, hemoglobin A1C <7.0% represents  optimal control in non-pregnant diabetic patients.  Different  metrics may apply to specific populations.   Standards of Medical Care in Diabetes - 2016.  For the purpose of screening for the presence of diabetes:  <5.7%     Consistent with the absence of diabetes  5.7-6.4%  Consistent with increasing risk for diabetes   (prediabetes)  >or=6.5%  Consistent with diabetes  Currently no consensus exists for use of hemoglobin A1C  for diagnosis of diabetes for children.      Comment for A1C at  1:06 PM on 1/5/2017:  According to ADA guidelines, hemoglobin A1C <7.0% represents  optimal control in non-pregnant diabetic patients.  Different  metrics may apply to specific populations.   Standards of Medical Care in Diabetes - 2016.  For the purpose of screening for the presence of diabetes:  <5.7%     Consistent with the absence of diabetes  5.7-6.4%  Consistent with increasing risk for diabetes   (prediabetes)  >or=6.5%  Consistent with diabetes  Currently no consensus exists for use of hemoglobin A1C  for diagnosis of diabetes for children.        Allergies as of 4/6/2017        Reactions    Metronidazole Hives, Itching      Advance Directives     An advance directive is a document which, in the event you are no longer able to make decisions for yourself, tells your healthcare team what kind of treatment you do or do not want to receive, or who you would like to make those decisions for you.  If you do not " currently have an advance directive, [facility] encourages you to create one.  For more information call:  [phone]), [phone] ([phone]),  or log on to [email or web address].        Language Assistance Services     ATTENTION: Language assistance services are available, free of charge. Please call [phone].      ATENCIÓN: Si habla español, tiene a paulson disposición servicios gratuitos de asistencia lingüística. Llame al [phone].     CHÚ Ý: N?u b?n nói Ti?ng Vi?t, có các d?ch v? h? tr? ngôn ng? mi?n phí dành cho b?n. G?i s? [phone].         [facility] complies with applicable Federal civil rights laws and does not discriminate on the basis of race, color, national origin, age, disability, or sex.

## 2017-04-06 NOTE — TRANSFER OF CARE
"Anesthesia Transfer of Care Note    Patient: Moris Alfaro    Procedure(s) Performed: Procedure(s) (LRB):  LAPAROSCOPY-DIAGNOSTIC (N/A)  [id number]- LEFT (Left)    Patient location: PACU    Anesthesia Type: general    Transport from OR: Transported from OR on room air with adequate spontaneous ventilation    Post pain: adequate analgesia    Post assessment: no apparent anesthetic complications and tolerated procedure well    Post vital signs: stable    Level of consciousness: awake and alert    Nausea/Vomiting: no nausea/vomiting    Complications: none          Last vitals:   Visit Vitals    BP (!) 104/58 (BP Location: Left arm, Patient Position: Lying, BP Method: Automatic)    Pulse 60    Temp 36.6 °C (97.9 °F) (Skin)    Resp 16    Ht 5' 4" (1.626 m)    Wt 68 kg (150 lb)    SpO2 100%    Breastfeeding No    BMI 25.75 kg/m2     "

## 2017-04-06 NOTE — ANESTHESIA POSTPROCEDURE EVALUATION
"Anesthesia Post Evaluation    Patient: Moris Alfaro    Procedure(s) Performed: Procedure(s) (LRB):  LAPAROSCOPY-DIAGNOSTIC (N/A)  [id number]- LEFT (Left)    Final Anesthesia Type: general  Patient location during evaluation: PACU  Patient participation: Yes- Able to Participate  Level of consciousness: awake and alert and oriented  Post-procedure vital signs: reviewed and stable  Pain management: adequate  Airway patency: patent  PONV status at discharge: No PONV  Anesthetic complications: no      Cardiovascular status: hemodynamically stable and blood pressure returned to baseline  Respiratory status: unassisted, spontaneous ventilation and room air  Hydration status: euvolemic  Follow-up not needed.        Visit Vitals    BP (!) 98/56 (BP Location: Left arm, Patient Position: Lying, BP Method: Automatic)    Pulse (!) 55    Temp 36.6 °C (97.9 °F) (Skin)    Resp 16    Ht 5' 4" (1.626 m)    Wt 68 kg (150 lb)    SpO2 98%    Breastfeeding No    BMI 25.75 kg/m2       Pain/Liu Score: Pain Assessment Performed: Yes (4/6/2017  8:31 AM)  Presence of Pain: complains of pain/discomfort (4/6/2017  8:31 AM)  Pain Rating Prior to Med Admin: 2 (4/6/2017  8:35 AM)  Pain Rating Post Med Admin: 2 (4/6/2017  8:31 AM)  Liu Score: 10 (4/6/2017  8:31 AM)      "

## 2017-04-07 NOTE — OP NOTE
This note has been moved from another encounter. If you have any questions, please contact [facility] at [phone]. Thank you.

## 2017-04-07 NOTE — OP NOTE
DATE OF PROCEDURE:  04/06/2017    PREOPERATIVE DIAGNOSES:  Left pelvic mass and pelvic pain.    POSTOPERATIVE DIAGNOSES:  Left hydrosalpinx and pelvic adhesions.    PROCEDURE:  Diagnostic laparoscopy with laparoscopic left salpingo-oophorectomy.    SURGEON:  Chris Hernandez M.D.    ESTIMATED BLOOD LOSS:  10 mL.    COMPLICATIONS:  None.    SPECIMENS:  Include left ovary and tube.  Counts were correct.    PROCEDURE IN DETAIL:  General anesthetic was used.  After the proper informed   consent, the patient was brought to the Operating Suite, prepped and draped in   usual sterile fashion.  The patient had exam under anesthesia performed.  Sponge   stick was inserted in the vagina and Thomas catheter.  A small umbilical   incision was made, through which a 5-mm trocar was placed.  Under direct   visualization, a small left lower quadrant incision was made through which a   second trocar was placed.  Inspection of the peritoneal cavity found it to be   omental adhesions between the underside of the abdomen and the greater omentum.    Harmonic scalpel was used to excise these adhesions, so that clear   visualization of the pelvic anatomy could be seen.  At this time after   adhesiolysis, inspection of the peritoneal cavity revealed a very large   approximately 9 cm hydrosalpinx.  The patient had a right ovary, which was   visualized and found to be clearly within normal limits in terms of size, shape,   architecture and color.  The patient had both ureters clearly visualized and   found to be nondilated peristalsing freely in a normal anatomical position.    Under direct visualization, a small suprapubic incision was made through which a   third 5-mm trocar was placed.  At this time, harmonic scalpel was used to   clearly delineate and visualize the left ovary and fallopian tube, which again   was obviously a large hydrosalpinx.  A small ramp was made in the hydrosalpinx   and endo suction was placed to suck up and withdrawal  of clear serous fluid.    After decompression of the hydrosalpinx, Harmonic scalpel was used to clearly   excise the tube and ovary.  These were placed in an EndoCatch bag and EndoCatch   bag containing the left ovary and tube were withdrawn through the 10 mm trocar.    Clear urine appeared through the clear plastic Thomas throughout the procedure.    Both ureters were visualized post-procedurally and found to be nondilated and   peristalsing freely.  In addition, the hemostasis was found to be maintained.    No residual bleeding was noted from the left pelvic sidewall.  Having completed   this portion of the procedure, a 10 mm trocar was placed.  The fascial defect   was closed with an Endoclose suture.  Decompression of the abdominal wall was   performed removing the trocars and laparoscope.  The abdominal wall incisions   were closed with interrupted 2-0 Vicryl sutures.  Having completed the procedure   and counts being correct, the patient being stable, she was extubated and   transferred to the Recovery Room.  Specimens will be sent to Pathology for   review.      RALEIGH  dd: 04/06/2017 07:54:09 (CDT)  td: 04/06/2017 08:59:24 (CDT)  Doc ID   #[id number]  Job ID #[id number]    CC:

## 2017-04-07 NOTE — DISCHARGE SUMMARY
HOSPITAL COURSE:  The patient is status post diagnostic laparoscopy with   laparoscopic adhesiolysis and left salpingo-oophorectomy of a left hydrosalpinx.    On discharge, the patient's vital signs were reassessed.  If stable and   reactive, the patient will be discharged to home.  Instructions will include   nothing per rectum, nothing per vagina, no heavy lifting for 2 weeks.  She will   be recommended on a regular diet.  She will be given Percocet to be taken on a   p.r.n. basis for pain, which will be E-scribed and she will be scheduled to   follow up in my office in 2 weeks.  She is instructed to contact me prior to   that time for any complications including intractable nausea, vomiting, pain or   fever.      RALEIGH  dd: 04/06/2017 07:54:09 (CDT)  td: 04/07/2017 03:43:44 (CDT)  Doc ID   #[id number]  Job ID #[id number]    CC:

## 2017-04-07 NOTE — DISCHARGE SUMMARY
This note has been moved from another encounter. If you have any questions, please contact [facility] at [phone]. Thank you.

## 2017-04-10 ENCOUNTER — TELEPHONE (OUTPATIENT)
Dept: NEUROLOGY | Facility: CLINIC | Age: 37
End: 2017-04-10

## 2017-04-10 NOTE — TELEPHONE ENCOUNTER
Authorization for MRI has been received. Authorization number: [id number]. Approved  03/24/2017  - 05/23/2017. [facility] prior authorization information: Phone: [phone]. Fax: [phone]. Authorization letter sent to scanning.

## 2017-04-12 NOTE — OP NOTE
DATE OF PROCEDURE:  04/06/2017    PREOPERATIVE DIAGNOSIS:  Left adnexal mass.    POSTOPERATIVE DIAGNOSIS:  Left ovarian cyst with left hydrosalpinx.    PROCEDURE:  Diagnostic laparoscopy with left salpingo-oophorectomy.    SURGEON:  Chris Hernandez M.D.    ESTIMATED BLOOD LOSS:  5 mL.    SPECIMENS:  Include left ovary and tube.    ANESTHETIC:  General anesthetic.    COUNTS:  Correct.    COMPLICATIONS:  None.    PROCEDURE IN DETAIL:  After proper informed consent, the patient was brought to   the Operating Suite, prepped and draped in the usual sterile fashion.  The   patient had exam under anesthesia performed.  CHRISS uterine manipulator was   placed.  The patient began with abdominal portion of the procedure.  A small   umbilical incision was made, through which a 5-mm trocar was placed.  The   abdomen was insufflated.  The surgeon placed a 5 mm, 0-degree laparoscope,   placing the patient in a slight Trendelenburg position and after the abdomen was   insufflated, under direct visualization, a small left lower quadrant incision   was made, through which a second trocar was placed.  Inspection of the   peritoneal cavity found there to be relatively dense omental adhesions between   the underside of the abdomen and the greater omentum.  Using the Harmonic   scalpel, these were taken down in order to make the pelvic anatomy accessible.    After relieving the omental adhesions, further inspection of the peritoneal   cavity found there to be a large, approximately 7 cm hydrosalpinx with ovarian   remnant in the left adnexal region, the right ovary appeared normal.  Both   ureters were clearly visualized and found to be nondilated, peristalsing freely.    Due to the large size of the hydrosalpinx, a decision was made at that time to   perform a LSO.  Therefore, under direct visualization, a small suprapubic   incision was made, through which a third trocar was placed on direct   laparoscopic guidance.  Decompression of the  hydrosalpinx with a clear serous   fluid was facilitated with the Harmonic scalpel.  The left ovary and tube were   excised and secured again using the Harmonic scalpel.  Excellent hemostasis was   found.  An EndoCatch bag was placed through the 10 mm trocar.  Ovary and tube   were placed in the EndoCatch bag and the EndoCatch bag and trocar were withdrawn   through the 10 mm opening.  Excellent hemostasis was found.  No residual   bleeding was coming from the pedicle.  Having completed the procedure, clear   urine appearing through the clear plastic Thomas catheter was seen.  The fascial   incision was then closed with the Endoclose device.  Skin incisions were closed   after removal of the accessory trocars and desufflation of the abdomen.  The   patient being stable and counts being correct, she was extubated and she was   transferred to the Recovery Room in stable condition.      RALEIGH  dd: 04/11/2017 14:08:10 (CDT)  td: 04/12/2017 01:18:59 (CDT)  Doc ID   #[id number]  Job ID #[id number]    CC:

## 2017-04-12 NOTE — DISCHARGE SUMMARY
HOSPITAL COURSE:  After observation in recovery, the patient is tolerating a   regular fluid intake, diet and pain being well controlled.  The patient was   found to be reactive and scheduled for discharge home.  On physical examination,   her vital signs are stable.  Her chest is clear.  Her abdomen is soft with only   mild postsurgical tenderness.  Her discharge medications will include Percocet   to be taken on a p.r.n. basis for pain.  Diet will be a regular diet.    Restrictions include nothing per rectum, nothing per vagina, no heavy lifting   for 4 weeks.  The patient will be scheduled to follow up in my office in 2   weeks.  She is urged to contact me prior to that time for any complications   including intractable nausea, vomiting, pain or bleeding.      RALEIGH  dd: 04/11/2017 14:08:10 (CDT)  td: 04/12/2017 01:51:17 (CDT)  Doc ID   #[id number]  Job ID #[id number]    CC:

## 2017-04-12 NOTE — DISCHARGE SUMMARY
This note has been moved from another encounter. If you have any questions, please contact [facility] at [phone]. Thank you.

## 2017-04-20 ENCOUNTER — PATIENT MESSAGE (OUTPATIENT)
Dept: OBSTETRICS AND GYNECOLOGY | Facility: CLINIC | Age: 37
End: 2017-04-20

## 2017-04-20 ENCOUNTER — OFFICE VISIT (OUTPATIENT)
Dept: OBSTETRICS AND GYNECOLOGY | Facility: CLINIC | Age: 37
End: 2017-04-20
Payer: COMMERCIAL

## 2017-04-20 VITALS — HEIGHT: 64 IN | WEIGHT: 143.5 LBS | BODY MASS INDEX: 24.5 KG/M2

## 2017-04-20 DIAGNOSIS — Z09 POSTOPERATIVE EXAMINATION: Primary | ICD-10-CM

## 2017-04-20 PROCEDURE — 99999 PR PBB SHADOW E&M-EST. PATIENT-LVL II: CPT | Mod: PBBFAC,,, | Performed by: SPECIALIST

## 2017-04-20 PROCEDURE — 99024 POSTOP FOLLOW-UP VISIT: CPT | Mod: S$GLB,,, | Performed by: SPECIALIST

## 2017-04-20 NOTE — PROGRESS NOTES
36 yo BF 2 weeks s/p laparscopic LSO for hydrosalpinx and adhesiolysis. Pt doeing well. Pt c/o mild PO pain. Denies f/c, n/v.  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    Thyroid disease     Toe fracture        Past Surgical History:   Procedure Laterality Date    dx lap      ecsi      HYSTERECTOMY  2007    CORINA due to DUB,  ovaries intact    lumbar facet injection      SC EXPLORATORY OF ABDOMEN      RHIZOTOMY      TUBAL LIGATION         Family History   Problem Relation Age of Onset    Thyroid disease Mother     Heart disease Father     Hypertension Father     Pancreatitis Father     Diabetes Father     Stomach cancer Paternal Aunt     Stomach cancer Paternal Uncle     Cancer Paternal Uncle      lung cancer    Melanoma Neg Hx     Psoriasis Neg Hx     Lupus Neg Hx     Eczema Neg Hx        Social History     Social History    Marital status: Legally      Spouse name: N/A    Number of children: N/A    Years of education: N/A     Social History Main Topics    Smoking status: Never Smoker    Smokeless tobacco: Never Used    Alcohol use Yes      Comment: OCCASIONALLY    Drug use: No    Sexual activity: Yes     Partners: Male     Birth control/ protection: None     Other Topics Concern    None     Social History Narrative       Current Outpatient Prescriptions   Medication Sig Dispense Refill    cyclobenzaprine (FLEXERIL) 10 MG tablet Take 10 mg by mouth 2 (two) times daily.       dextroamphetamine-amphetamine (ADDERALL) 20 mg tablet Take 1 tablet by mouth 2 (two) times daily.       diltiaZEM (CARDIZEM CD) 180 MG 24 hr capsule Take 180 mg  by mouth once daily.      ergocalciferol (VITAMIN D2) 50,000 unit Cap Take 1 capsule (50,000 Units total) by mouth every 7 days. 12 capsule 3    gabapentin (NEURONTIN) 800 MG tablet Take 800 mg by mouth 3 (three) times daily.  3    levothyroxine (SYNTHROID) 88 MCG tablet Take 1 tablet (88 mcg total) by mouth once daily. 90 tablet 3    omeprazole (PRILOSEC) 40 MG capsule TAKE 1 CAPSULE BY MOUTH EVERY DAILY 90 capsule 3    propranolol (INDERAL LA) 80 MG 24 hr capsule Take 1 capsule (80 mg total) by mouth once daily. 30 capsule 4    topiramate (TOPAMAX) 50 MG tablet Take 1 tablet (50 mg total) by mouth 2 (two) times daily. 60 tablet 11    venlafaxine (EFFEXOR-XR) 150 MG Cp24 Take 150 mg by mouth once daily.       zolpidem (AMBIEN CR) 12.5 MG CR tablet Take 1 tablet by mouth nightly as needed.  3     No current facility-administered medications for this visit.        Review of patient's allergies indicates:   Allergen Reactions    Metronidazole Hives and Itching       Review of System:   General: no chills, fever, night sweats, weight gain or weight loss  Psychological: no depression or suicidal ideation  Breasts: no new or changing breast lumps, nipple discharge or masses.  Respiratory: no cough, shortness of breath, or wheezing  Cardiovascular: no chest pain or dyspnea on exertion  Gastrointestinal: no abdominal pain, change in bowel habits, or black or bloody stools  Genito-Urinary: no incontinence, urinary frequency/urgency or vulvar/vaginal symptoms, pelvic pain or abnormal vaginal bleeding.  Musculoskeletal: no gait disturbance or muscular weakness    EXAM  VSS              Abdomen soft, incisions well approx. No s/s infection    I discussed PO care and pathology results    Plan RTO 1 year/prn

## 2017-04-20 NOTE — MR AVS SNAPSHOT
[facility] - OB/GYN  [address]  [facility] [address]  Phone: [phone]                  Moris Alfaro   2017 11:00 AM   Office Visit    Description:  Female : 1980   Provider:  Chris Hernandez MD   Department:  [facility] - OB/GYN           Reason for Visit     Post-op Evaluation     17/  lap at asu                To Do List           Future Appointments        Provider Department Dept Phone    2017 4:30 PM Dany Rucker MD [place] - Endo/Diabetes [phone]    2017 10:30 AM Stephanie Singer MD [facility] Neurology [phone]      Goals (5 Years of Data)     None      [facility]     [facility] On [facility] Nurse Care Line -  Assistance  Unless otherwise directed by your provider, please contact [facility], our nurse care line that is available for  assistance.     Registered nurses in the [facility] Call Center provide: appointment scheduling, clinical advisement, health education, and other advisory services.  Call: [phone] (toll free)               Medications           Message regarding Medications     Verify the changes and/or additions to your medication regime listed below are the same as discussed with your clinician today.  If any of these changes or additions are incorrect, please notify your healthcare provider.        STOP taking these medications     ibuprofen (ADVIL,MOTRIN) 800 MG tablet Take 800 mg by mouth 3 (three) times daily.    oxycodone-acetaminophen (PERCOCET)  mg per tablet Take 1 tablet by mouth every 6 (six) hours as needed for Pain.    zolmitriptan (ZOMIG) 5 mg Spry 1 spray by Nasal route once as needed.           Verify that the below list of medications is an accurate representation of the medications you are currently taking.  If none reported, the list may be blank. If incorrect, please contact your healthcare provider. Carry this list with you in case of emergency.           Current  "Medications     cyclobenzaprine (FLEXERIL) 10 MG tablet Take 10 mg by mouth 2 (two) times daily.     dextroamphetamine-amphetamine (ADDERALL) 20 mg tablet Take 1 tablet by mouth 2 (two) times daily.     diltiaZEM (CARDIZEM CD) 180 MG 24 hr capsule Take 180 mg by mouth once daily.    ergocalciferol (VITAMIN D2) 50,000 unit Cap Take 1 capsule (50,000 Units total) by mouth every 7 days.    gabapentin (NEURONTIN) 800 MG tablet Take 800 mg by mouth 3 (three) times daily.    levothyroxine (SYNTHROID) 88 MCG tablet Take 1 tablet (88 mcg total) by mouth once daily.    omeprazole (PRILOSEC) 40 MG capsule TAKE 1 CAPSULE BY MOUTH EVERY DAILY    propranolol (INDERAL LA) 80 MG 24 hr capsule Take 1 capsule (80 mg total) by mouth once daily.    topiramate (TOPAMAX) 50 MG tablet Take 1 tablet (50 mg total) by mouth 2 (two) times daily.    venlafaxine (EFFEXOR-XR) 150 MG Cp24 Take 150 mg by mouth once daily.     zolpidem (AMBIEN CR) 12.5 MG CR tablet Take 1 tablet by mouth nightly as needed.           Clinical Reference Information           Your Vitals Were     Height Weight BMI          5' 4" (1.626 m) 65.1 kg (143 lb 8.3 oz) 24.64 kg/m2        Allergies as of 4/20/2017     Metronidazole      Immunizations Administered on Date of Encounter - 4/20/2017     None      Language Assistance Services     ATTENTION: Language assistance services are available, free of charge. Please call [phone].      ATENCIÓN: Si madan wynne, tiene a paulson disposición servicios gratuitos de asistencia lingüística. Llame al [phone].     [facility] Ý: N?u b?n nói Ti?ng Vi?t, có các d?ch v? h? tr? ngôn ng? mi?n phí dành cho b?n. G?i s? [phone].         [facility] - OB/GYN complies with applicable Federal civil rights laws and does not discriminate on the basis of race, color, national origin, age, disability, or sex.        "

## 2017-05-01 ENCOUNTER — PATIENT MESSAGE (OUTPATIENT)
Dept: FAMILY MEDICINE | Facility: CLINIC | Age: 37
End: 2017-05-01

## 2017-05-01 ENCOUNTER — PATIENT MESSAGE (OUTPATIENT)
Dept: OBSTETRICS AND GYNECOLOGY | Facility: CLINIC | Age: 37
End: 2017-05-01

## 2017-05-01 RX ORDER — OMEPRAZOLE 40 MG/1
CAPSULE, DELAYED RELEASE ORAL
Qty: 90 CAPSULE | Refills: 3 | Status: CANCELLED | OUTPATIENT
Start: 2017-05-01

## 2017-05-01 RX ORDER — TRAMADOL HYDROCHLORIDE 50 MG/1
50 TABLET ORAL EVERY 6 HOURS PRN
Qty: 20 TABLET | Refills: 0 | Status: SHIPPED | OUTPATIENT
Start: 2017-05-01 | End: 2017-05-12

## 2017-05-12 ENCOUNTER — OFFICE VISIT (OUTPATIENT)
Dept: ENDOCRINOLOGY | Facility: CLINIC | Age: 37
End: 2017-05-12
Payer: COMMERCIAL

## 2017-05-12 ENCOUNTER — LAB VISIT (OUTPATIENT)
Dept: LAB | Facility: HOSPITAL | Age: 37
End: 2017-05-12
Attending: INTERNAL MEDICINE
Payer: COMMERCIAL

## 2017-05-12 VITALS
SYSTOLIC BLOOD PRESSURE: 113 MMHG | BODY MASS INDEX: 24.32 KG/M2 | HEIGHT: 64 IN | WEIGHT: 142.44 LBS | HEART RATE: 86 BPM | DIASTOLIC BLOOD PRESSURE: 77 MMHG

## 2017-05-12 DIAGNOSIS — M79.7 FIBROMYALGIA: ICD-10-CM

## 2017-05-12 DIAGNOSIS — E04.9 GOITER: ICD-10-CM

## 2017-05-12 DIAGNOSIS — E06.9 THYROIDITIS: ICD-10-CM

## 2017-05-12 DIAGNOSIS — F44.9 CONVERSION DISORDER: ICD-10-CM

## 2017-05-12 DIAGNOSIS — I34.1 MITRAL VALVE PROLAPSE: ICD-10-CM

## 2017-05-12 DIAGNOSIS — E78.00 HYPERCHOLESTEROLEMIA: ICD-10-CM

## 2017-05-12 DIAGNOSIS — E03.9 HYPOTHYROIDISM (ACQUIRED): ICD-10-CM

## 2017-05-12 DIAGNOSIS — K21.9 GERD WITHOUT ESOPHAGITIS: ICD-10-CM

## 2017-05-12 DIAGNOSIS — E78.00 HYPERCHOLESTEROLEMIA: Primary | ICD-10-CM

## 2017-05-12 DIAGNOSIS — E55.9 HYPOVITAMINOSIS D: ICD-10-CM

## 2017-05-12 DIAGNOSIS — F90.9 ATTENTION DEFICIT HYPERACTIVITY DISORDER (ADHD), UNSPECIFIED ADHD TYPE: ICD-10-CM

## 2017-05-12 DIAGNOSIS — E78.5 HYPERLIPIDEMIA, UNSPECIFIED HYPERLIPIDEMIA TYPE: ICD-10-CM

## 2017-05-12 DIAGNOSIS — E11.65 TYPE 2 DIABETES MELLITUS WITH HYPERGLYCEMIA, WITHOUT LONG-TERM CURRENT USE OF INSULIN: ICD-10-CM

## 2017-05-12 DIAGNOSIS — E11.65 TYPE 2 DIABETES MELLITUS WITH HYPERGLYCEMIA, WITHOUT LONG-TERM CURRENT USE OF INSULIN: Primary | ICD-10-CM

## 2017-05-12 DIAGNOSIS — F41.9 ANXIETY: ICD-10-CM

## 2017-05-12 DIAGNOSIS — F32.89 OTHER DEPRESSION: ICD-10-CM

## 2017-05-12 LAB
25(OH)D3+25(OH)D2 SERPL-MCNC: 27 NG/ML
ALBUMIN SERPL BCP-MCNC: 4 G/DL
ALP SERPL-CCNC: 52 U/L
ALT SERPL W/O P-5'-P-CCNC: 11 U/L
AMYLASE SERPL-CCNC: 93 U/L
ANION GAP SERPL CALC-SCNC: 8 MMOL/L
AST SERPL-CCNC: 11 U/L
BASOPHILS # BLD AUTO: 0.01 K/UL
BASOPHILS NFR BLD: 0.2 %
BILIRUB SERPL-MCNC: 0.2 MG/DL
BUN SERPL-MCNC: 7 MG/DL
CALCIUM SERPL-MCNC: 9.8 MG/DL
CHLORIDE SERPL-SCNC: 105 MMOL/L
CHOLEST/HDLC SERPL: 5 {RATIO}
CO2 SERPL-SCNC: 23 MMOL/L
CREAT SERPL-MCNC: 0.9 MG/DL
DIFFERENTIAL METHOD: NORMAL
EOSINOPHIL # BLD AUTO: 0.1 K/UL
EOSINOPHIL NFR BLD: 1 %
ERYTHROCYTE [DISTWIDTH] IN BLOOD BY AUTOMATED COUNT: 13.1 %
EST. GFR  (AFRICAN AMERICAN): >60 ML/MIN/1.73 M^2
EST. GFR  (NON AFRICAN AMERICAN): >60 ML/MIN/1.73 M^2
ESTIMATED AVG GLUCOSE: 114 MG/DL
GLUCOSE SERPL-MCNC: 93 MG/DL
HBA1C MFR BLD HPLC: 5.6 %
HCT VFR BLD AUTO: 40.8 %
HDL/CHOLESTEROL RATIO: 20 %
HDLC SERPL-MCNC: 235 MG/DL
HDLC SERPL-MCNC: 47 MG/DL
HGB BLD-MCNC: 13.9 G/DL
LDLC SERPL CALC-MCNC: 153.2 MG/DL
LIPASE SERPL-CCNC: 58 U/L
LYMPHOCYTES # BLD AUTO: 2.1 K/UL
LYMPHOCYTES NFR BLD: 41.3 %
MCH RBC QN AUTO: 28.4 PG
MCHC RBC AUTO-ENTMCNC: 34.1 %
MCV RBC AUTO: 83 FL
MONOCYTES # BLD AUTO: 0.4 K/UL
MONOCYTES NFR BLD: 8.4 %
NEUTROPHILS # BLD AUTO: 2.5 K/UL
NEUTROPHILS NFR BLD: 48.9 %
NONHDLC SERPL-MCNC: 188 MG/DL
PLATELET # BLD AUTO: 278 K/UL
PMV BLD AUTO: 11 FL
POTASSIUM SERPL-SCNC: 3.9 MMOL/L
PROT SERPL-MCNC: 7.6 G/DL
RBC # BLD AUTO: 4.9 M/UL
SODIUM SERPL-SCNC: 136 MMOL/L
T3 SERPL-MCNC: 85 NG/DL
T4 FREE SERPL-MCNC: 1.1 NG/DL
TRIGL SERPL-MCNC: 174 MG/DL
TSH SERPL DL<=0.005 MIU/L-ACNC: 1.09 UIU/ML
URATE SERPL-MCNC: 3.7 MG/DL
WBC # BLD AUTO: 5.11 K/UL

## 2017-05-12 PROCEDURE — 84443 ASSAY THYROID STIM HORMONE: CPT

## 2017-05-12 PROCEDURE — 82306 VITAMIN D 25 HYDROXY: CPT

## 2017-05-12 PROCEDURE — 83036 HEMOGLOBIN GLYCOSYLATED A1C: CPT

## 2017-05-12 PROCEDURE — 84480 ASSAY TRIIODOTHYRONINE (T3): CPT

## 2017-05-12 PROCEDURE — 3044F HG A1C LEVEL LT 7.0%: CPT | Mod: S$GLB,,, | Performed by: INTERNAL MEDICINE

## 2017-05-12 PROCEDURE — 84550 ASSAY OF BLOOD/URIC ACID: CPT

## 2017-05-12 PROCEDURE — 82985 ASSAY OF GLYCATED PROTEIN: CPT

## 2017-05-12 PROCEDURE — 3060F POS MICROALBUMINURIA REV: CPT | Mod: 8P,S$GLB,, | Performed by: INTERNAL MEDICINE

## 2017-05-12 PROCEDURE — 86800 THYROGLOBULIN ANTIBODY: CPT

## 2017-05-12 PROCEDURE — 85025 COMPLETE CBC W/AUTO DIFF WBC: CPT

## 2017-05-12 PROCEDURE — 80053 COMPREHEN METABOLIC PANEL: CPT

## 2017-05-12 PROCEDURE — 84378 SUGARS SINGLE QUANT: CPT

## 2017-05-12 PROCEDURE — 99999 PR PBB SHADOW E&M-EST. PATIENT-LVL III: CPT | Mod: PBBFAC,,, | Performed by: INTERNAL MEDICINE

## 2017-05-12 PROCEDURE — 82150 ASSAY OF AMYLASE: CPT

## 2017-05-12 PROCEDURE — 84439 ASSAY OF FREE THYROXINE: CPT

## 2017-05-12 PROCEDURE — 99214 OFFICE O/P EST MOD 30 MIN: CPT | Mod: S$GLB,,, | Performed by: INTERNAL MEDICINE

## 2017-05-12 PROCEDURE — 80061 LIPID PANEL: CPT

## 2017-05-12 PROCEDURE — 83690 ASSAY OF LIPASE: CPT

## 2017-05-12 PROCEDURE — 1160F RVW MEDS BY RX/DR IN RCRD: CPT | Mod: S$GLB,,, | Performed by: INTERNAL MEDICINE

## 2017-05-12 RX ORDER — LANCETS
1 EACH MISCELLANEOUS DAILY
Qty: 90 EACH | Refills: 3 | Status: SHIPPED | OUTPATIENT
Start: 2017-05-12 | End: 2018-08-27 | Stop reason: SDUPTHER

## 2017-05-12 RX ORDER — PRAVASTATIN SODIUM 20 MG/1
20 TABLET ORAL DAILY
Qty: 90 TABLET | Refills: 3 | Status: SHIPPED | OUTPATIENT
Start: 2017-05-12 | End: 2018-06-20 | Stop reason: SDUPTHER

## 2017-05-12 NOTE — MR AVS SNAPSHOT
[place] - Endo/Diabetes  [address]  [place] [address]  Phone: [phone]                  Moris HannaDavidHuynh   2017 9:30 AM   Office Visit    Description:  Female : 1980   Provider:  Dany Rucker MD   Department:  [place] - Endo/Diabetes           Diagnoses this Visit        Comments    Type 2 diabetes mellitus with hyperglycemia, without long-term current use of insulin    -  Primary     Hypothyroidism (acquired)         Goiter         Thyroiditis         Mitral valve prolapse         Other depression         Anxiety         Conversion disorder         GERD without esophagitis         Hyperlipidemia, unspecified hyperlipidemia type         Hypercholesterolemia         Hypovitaminosis D         Attention deficit hyperactivity disorder (ADHD), unspecified ADHD type         Fibromyalgia                To Do List           Future Appointments        Provider Department Dept Phone    2017 10:30 AM Stephanie Singer MD [facility] Neurology [phone]    10/23/2017 3:00 PM MD [facility] - Endo/Diabetes [phone]      Goals (5 Years of Data)     None      Follow-Up and Disposition     Return in about 3 months (around 2017).    Follow-up and Disposition History      [facility]     [facility] On [facility] Nurse Care Line -  Assistance  Unless otherwise directed by your provider, please contact [facility], our nurse care line that is available for  assistance.     Registered nurses in the [facility] Center provide: appointment scheduling, clinical advisement, health education, and other advisory services.  Call: [phone] (toll free)               Medications           Message regarding Medications     Verify the changes and/or additions to your medication regime listed below are the same as discussed with your clinician today.  If any of these changes or additions are incorrect, please notify your healthcare provider.        STOP  "taking these medications     tramadol (ULTRAM) 50 mg tablet Take 1 tablet (50 mg total) by mouth every 6 (six) hours as needed for Pain.           Verify that the below list of medications is an accurate representation of the medications you are currently taking.  If none reported, the list may be blank. If incorrect, please contact your healthcare provider. Carry this list with you in case of emergency.           Current Medications     blood sugar diagnostic Strp 1 strip by Misc.(Non-Drug; Combo Route) route once daily. One Touch Verio    cyclobenzaprine (FLEXERIL) 10 MG tablet Take 10 mg by mouth 2 (two) times daily.     dextroamphetamine-amphetamine (ADDERALL) 20 mg tablet Take 1 tablet by mouth 2 (two) times daily.     diltiaZEM (CARDIZEM CD) 180 MG 24 hr capsule Take 180 mg by mouth once daily.    ergocalciferol (VITAMIN D2) 50,000 unit Cap Take 1 capsule (50,000 Units total) by mouth every 7 days.    gabapentin (NEURONTIN) 800 MG tablet Take 800 mg by mouth 3 (three) times daily.    lancets Misc 1 Device by Misc.(Non-Drug; Combo Route) route once daily. One touch verio    levothyroxine (SYNTHROID) 88 MCG tablet Take 1 tablet (88 mcg total) by mouth once daily.    omeprazole (PRILOSEC) 40 MG capsule TAKE 1 CAPSULE BY MOUTH EVERY DAILY    pravastatin (PRAVACHOL) 20 MG tablet Take 1 tablet (20 mg total) by mouth once daily.    propranolol (INDERAL LA) 80 MG 24 hr capsule Take 1 capsule (80 mg total) by mouth once daily.    topiramate (TOPAMAX) 50 MG tablet Take 1 tablet (50 mg total) by mouth 2 (two) times daily.    venlafaxine (EFFEXOR-XR) 150 MG Cp24 Take 150 mg by mouth once daily.     zolpidem (AMBIEN CR) 12.5 MG CR tablet Take 1 tablet by mouth nightly as needed.           Clinical Reference Information           Your Vitals Were     BP Pulse Height Weight BMI    113/77 86 5' 4" (1.626 m) 64.6 kg (142 lb 6.7 oz) 24.45 kg/m2      Blood Pressure          Most Recent Value    BP  113/77      Allergies as of " 5/12/2017     Metronidazole      Immunizations Administered on Date of Encounter - 5/12/2017     None      Orders Placed During Today's Visit     Future Labs/Procedures Expected by Expires    Amylase  5/12/2017 7/11/2018    CBC auto differential  5/12/2017 7/11/2018    Comprehensive metabolic panel  5/12/2017 7/11/2018    Fructosamine  5/12/2017 7/11/2018    GlycoMark (TM)  5/12/2017 7/11/2018    Hemoglobin A1c  5/12/2017 7/11/2018    Lipase  5/12/2017 7/11/2018    Lipid panel  5/12/2017 7/11/2018    Microalbumin/creatinine urine ratio  5/12/2017 7/11/2018    T3  5/12/2017 7/11/2018    T4, free  5/12/2017 7/11/2018    Thyroglobulin  5/12/2017 7/11/2018    TSH  5/12/2017 7/11/2018    Uric acid  5/12/2017 7/11/2018    Urinalysis  5/12/2017 7/11/2018    Vitamin D  5/12/2017 7/11/2018      Language Assistance Services     ATTENTION: Language assistance services are available, free of charge. Please call [phone].      ATENCIÓN: Si habla español, tiene a paulson disposición servicios gratuitos de asistencia lingüística. Llame al [phone].     CHÚ Ý: N?u b?n nói Ti?ng Vi?t, có các d?ch v? h? tr? ngôn ng? mi?n phí dành cho b?n. G?i s? [phone].         [place] - Endo/Diabetes complies with applicable Federal civil rights laws and does not discriminate on the basis of race, color, national origin, age, disability, or sex.

## 2017-05-12 NOTE — PROGRESS NOTES
Subjective:      Patient ID: Moris Alfaro is a 37 y.o. female.    Chief Complaint:    37 yr old lady with hypothyroidism on thyroid hormone repletion and type 2 diabetes.    History of Present Illness    Patient is a 37 yr old lady seen in [facility] on account of subclinical hypothyroidism due to Hashimoto's thyroiditis. She also has a background history of chronic migraines, obesity, early onset type 2 diabetes,  anxiety neurosis, conversion disorder and pseudoseizures in the past in addition to GERD.  Patient also has a poorly defined background past history of fibromyalgia and  had been commenced on thyroid hormone repletion with Lt4 @ 88mcg QD.  She is presently of the  metformin 500mg once DAILY for type 2 diabetes. At the present time she is entirely diet controlled. She is presently not doing SMBGs at all.  Her most recent HBA1c from 01/17 was 5.4 reflecting very good glycemic control.  In addition she had a screening thyroid ultrasound (from 07/15) which was essentially -ve for any significant thyroid nodular disease.  Patient feels well. She is sleeping a little better now but continues to have episodic insomnia.  She is s/p CORINA for endometriosis but has her ovaries insitu. She has been having intermittent hot flashes and drenching sweats and also has some mouth dryness for which she takes regular free fluid. Her hormonal testing however has not showed her to be perimenopausal.  Patient is now s/p unilateral oophorectomy and unilateral fallopian tube removal. She feels a little better now though she has some bilateral flank soreness intermittently.  Patient had her opthalmic screening done last year and this was normal with no evidence of retinopathy.  Patient does not receive flu vaccinations by choice. Patient has also never pneumovax again by choice.  Discussed with the patient the pros and cons of considering receiving both vaccinations as she has never received either of them before.         Review  "of Systems   Constitutional: Negative for diaphoresis and fatigue.   HENT: Negative for facial swelling and trouble swallowing.    Eyes: Negative for visual disturbance.   Respiratory: Negative for cough and shortness of breath.    Cardiovascular: Negative for chest pain and palpitations.   Gastrointestinal: Negative for abdominal pain, diarrhea, nausea and vomiting.   Endocrine: Negative for polyuria.   Genitourinary: Positive for flank pain (Bilateral and intermittent).   Musculoskeletal: Negative for arthralgias, back pain and myalgias.   Skin: Negative for color change, pallor and rash.   Neurological: Negative for dizziness, numbness and headaches.   Hematological: Does not bruise/bleed easily.   Psychiatric/Behavioral: Negative for confusion. The patient is not nervous/anxious.        Objective:  /77  Pulse 86  Ht 5' 4" (1.626 m)  Wt 64.6 kg (142 lb 6.7 oz)  BMI 24.45 kg/m2     Physical Exam   Constitutional: She is oriented to person, place, and time. She appears well-developed and well-nourished. No distress.   Pleasant young lady. Not pale, anicteric and afebrile. Well hydrated. Not in any apparent acute distress.   HENT:   Head: Normocephalic and atraumatic.   Mouth/Throat: Oropharynx is clear and moist. No oropharyngeal exudate.   Eyes: Conjunctivae and EOM are normal. Pupils are equal, round, and reactive to light. No scleral icterus.   Neck: Normal range of motion. Neck supple. No JVD present. No thyromegaly present.   Mild non tender thyromegaly today. No cervical adenopathy   Cardiovascular: Normal rate, regular rhythm and normal heart sounds.    No murmur heard.  Pulmonary/Chest: Effort normal and breath sounds normal. No respiratory distress. She has no wheezes.   Abdominal: Soft.   Musculoskeletal: Normal range of motion.   Lymphadenopathy:     She has no cervical adenopathy.   Neurological: She is alert and oriented to person, place, and time. No cranial nerve deficit.   Skin: Skin is " warm and dry. No rash noted. She is not diaphoretic. No erythema. No pallor.   Patches and macules on lateral aspects of both thighs.  Mildly scaly, hypopigmented.   Psychiatric: She has a normal mood and affect. Her behavior is normal. Judgment and thought content normal.   Vitals reviewed.      Lab Review:     Results for LILLIAM JEAN (MRN [id number]) as of 5/12/2017 09:45   Ref. Range 3/15/2017 12:17 3/30/2017 11:20 4/6/2017 07:39   WBC Latest Ref Range: 3.90 - 12.70 K/uL  6.21    RBC Latest Ref Range: 4.00 - 5.40 M/uL  4.44    Hemoglobin Latest Ref Range: 12.0 - 16.0 g/dL  12.3    Hematocrit Latest Ref Range: 37.0 - 48.5 %  38.6    MCV Latest Ref Range: 82 - 98 fL  87    MCH Latest Ref Range: 27.0 - 31.0 pg  27.7    MCHC Latest Ref Range: 32.0 - 36.0 %  31.9 (L)    RDW Latest Ref Range: 11.5 - 14.5 %  13.1    Platelets Latest Ref Range: 150 - 350 K/uL  301    MPV Latest Ref Range: 9.2 - 12.9 fL  10.9    Gran% Latest Ref Range: 38.0 - 73.0 %  57.4    Gran # Latest Ref Range: 1.8 - 7.7 K/uL  3.6    Lymph% Latest Ref Range: 18.0 - 48.0 %  32.0    Lymph # Latest Ref Range: 1.0 - 4.8 K/uL  2.0    Mono% Latest Ref Range: 4.0 - 15.0 %  8.9    Mono # Latest Ref Range: 0.3 - 1.0 K/uL  0.6    Eosinophil% Latest Ref Range: 0.0 - 8.0 %  1.0    Eos # Latest Ref Range: 0.0 - 0.5 K/uL  0.1    Basophil% Latest Ref Range: 0.0 - 1.9 %  0.2    Baso # Latest Ref Range: 0.00 - 0.20 K/uL  0.01    hCG Quant Latest Ref Range: See Text mIU/mL  <1.2      Results for LILLIAM JEAN (MRN [id number]) as of 5/12/2017 09:45   Ref. Range 1/5/2017 13:06   Uric Acid Latest Ref Range: 2.4 - 5.7 mg/dL 4.8   Triglycerides Latest Ref Range: 30 - 150 mg/dL 127   Cholesterol Latest Ref Range: 120 - 199 mg/dL 262 (H)   HDL Latest Ref Range: 40 - 75 mg/dL 51   LDL Cholesterol Latest Ref Range: 63.0 - 159.0 mg/dL 185.6 (H)   Total Cholesterol/HDL Ratio Latest Ref Range: 2.0 - 5.0  5.1 (H)   Vit D, 25-Hydroxy Latest Ref Range: 30 - 96 ng/mL 37    Hemoglobin A1C Latest Ref Range: 4.5 - 6.2 % 5.4   Estimated Avg Glucose Latest Ref Range: 68 - 131 mg/dL 108   GlycoMark (TM) Latest Units: ug/mL 20.5   TSH Latest Ref Range: 0.400 - 4.000 uIU/mL 0.839   T3, Total Latest Ref Range: 60 - 180 ng/dL 88   Free T4 Latest Ref Range: 0.71 - 1.51 ng/dL 0.94   Thyroglobulin Interpretation Unknown SEE BELOW   Thyroglobulin Antibody Screen Latest Ref Range: <4.0 IU/mL <1.8   Thyroglobulin, Tumor Marker Latest Units: ng/mL 12 (H)       Assessment:     1. Type 2 diabetes mellitus with hyperglycemia, without long-term current use of insulin  Hemoglobin A1c    GlycoMark (TM)    Fructosamine    Comprehensive metabolic panel    Amylase    Lipase    Microalbumin/creatinine urine ratio    Urinalysis   2. Hypothyroidism (acquired)  CBC auto differential    Lipid panel    Uric acid    T4, free    T3    TSH    Thyroglobulin   3. Goiter     4. Thyroiditis  Thyroglobulin   5. Mitral valve prolapse     6. Other depression     7. Anxiety     8. Conversion disorder     9. GERD without esophagitis     10. Hyperlipidemia, unspecified hyperlipidemia type  Comprehensive metabolic panel    Lipid panel    Uric acid   11. Hypercholesterolemia  Comprehensive metabolic panel    Lipid panel    Uric acid   12. Hypovitaminosis D  Vitamin D   13. Attention deficit hyperactivity disorder (ADHD), unspecified ADHD type     14. Fibromyalgia          Regarding hypothyroidism; Patient appears clinically euthyroid. To continue LT4 at present dose and may adjust LT4 dose based on results  Regarding early diabetes; She is to ensure SMBG checks 3 x weekly and remain of metformin for now. To also recheck HBA1c.  To continue strict low glycemic diet adherence as before.   Regarding GERD; symptomatically stable on PPI therapy.  Regarding hypovitaminosis D; to continue vitamin D supplementation and will recheck 25OH vitamin d levels.  Regarding depression; mood stable. To continue Effexor XR as before       Plan:      FFup in ~ 3mths

## 2017-05-13 LAB
THRYOGLOBULIN INTERPRETATION: ABNORMAL
THYROGLOB AB SERPL-ACNC: <1.8 IU/ML
THYROGLOB SERPL-MCNC: 13 NG/ML

## 2017-05-16 LAB
FRUCTOSAMINE SERPL-SCNC: 208 UMOL /L (ref 0–285)
GLYCOMARK (TM): 20.9 UG/ML

## 2017-05-21 RX ORDER — OMEPRAZOLE 40 MG/1
CAPSULE, DELAYED RELEASE ORAL
Qty: 90 CAPSULE | Refills: 3 | Status: SHIPPED | OUTPATIENT
Start: 2017-05-21 | End: 2019-11-11 | Stop reason: ALTCHOICE

## 2017-08-21 RX ORDER — RIZATRIPTAN BENZOATE 10 MG/1
TABLET ORAL
COMMUNITY
End: 2017-08-21 | Stop reason: CLARIF

## 2017-08-23 RX ORDER — RIZATRIPTAN BENZOATE 10 MG/1
10 TABLET ORAL
Qty: 9 TABLET | Refills: 0 | Status: SHIPPED | OUTPATIENT
Start: 2017-08-23 | End: 2019-01-17

## 2017-09-29 DIAGNOSIS — E55.9 HYPOVITAMINOSIS D: ICD-10-CM

## 2017-09-29 RX ORDER — ERGOCALCIFEROL 1.25 MG/1
CAPSULE ORAL
Qty: 12 CAPSULE | Refills: 3 | Status: SHIPPED | OUTPATIENT
Start: 2017-09-29 | End: 2017-10-24 | Stop reason: SDUPTHER

## 2017-10-03 RX ORDER — PROPRANOLOL HYDROCHLORIDE 80 MG/1
80 CAPSULE, EXTENDED RELEASE ORAL DAILY
Qty: 30 CAPSULE | Refills: 4 | Status: SHIPPED | OUTPATIENT
Start: 2017-10-03 | End: 2018-02-15 | Stop reason: SDUPTHER

## 2017-10-16 ENCOUNTER — PATIENT MESSAGE (OUTPATIENT)
Dept: ENDOCRINOLOGY | Facility: CLINIC | Age: 37
End: 2017-10-16

## 2017-10-19 ENCOUNTER — TELEPHONE (OUTPATIENT)
Dept: PRIMARY CARE CLINIC | Facility: CLINIC | Age: 37
End: 2017-10-19

## 2017-10-19 NOTE — TELEPHONE ENCOUNTER
----- Message from Emily Dean sent at 10/19/2017 12:55 PM CDT -----  Patient requesting to speak with nurse concerning appointment on Monday, October 23rd/has question/please call back at [phone] to advise.

## 2017-10-23 ENCOUNTER — LAB VISIT (OUTPATIENT)
Dept: LAB | Facility: HOSPITAL | Age: 37
End: 2017-10-23
Attending: INTERNAL MEDICINE
Payer: COMMERCIAL

## 2017-10-23 ENCOUNTER — OFFICE VISIT (OUTPATIENT)
Dept: ENDOCRINOLOGY | Facility: CLINIC | Age: 37
End: 2017-10-23
Payer: COMMERCIAL

## 2017-10-23 VITALS
SYSTOLIC BLOOD PRESSURE: 108 MMHG | HEIGHT: 64 IN | HEART RATE: 82 BPM | TEMPERATURE: 98 F | BODY MASS INDEX: 21.71 KG/M2 | DIASTOLIC BLOOD PRESSURE: 74 MMHG | WEIGHT: 127.19 LBS | RESPIRATION RATE: 18 BRPM

## 2017-10-23 DIAGNOSIS — K21.9 GERD WITHOUT ESOPHAGITIS: ICD-10-CM

## 2017-10-23 DIAGNOSIS — M79.7 FIBROMYALGIA: ICD-10-CM

## 2017-10-23 DIAGNOSIS — G43.809 OTHER MIGRAINE WITHOUT STATUS MIGRAINOSUS, NOT INTRACTABLE: ICD-10-CM

## 2017-10-23 DIAGNOSIS — F90.9 ATTENTION DEFICIT HYPERACTIVITY DISORDER (ADHD), UNSPECIFIED ADHD TYPE: ICD-10-CM

## 2017-10-23 DIAGNOSIS — E78.5 HYPERLIPIDEMIA, UNSPECIFIED HYPERLIPIDEMIA TYPE: ICD-10-CM

## 2017-10-23 DIAGNOSIS — E55.9 HYPOVITAMINOSIS D: ICD-10-CM

## 2017-10-23 DIAGNOSIS — E78.00 HYPERCHOLESTEROLEMIA: ICD-10-CM

## 2017-10-23 DIAGNOSIS — E03.9 HYPOTHYROIDISM (ACQUIRED): ICD-10-CM

## 2017-10-23 DIAGNOSIS — F44.9 CONVERSION DISORDER: ICD-10-CM

## 2017-10-23 DIAGNOSIS — Z90.710 H/O ABDOMINAL HYSTERECTOMY: ICD-10-CM

## 2017-10-23 DIAGNOSIS — E06.9 THYROIDITIS: ICD-10-CM

## 2017-10-23 DIAGNOSIS — F32.89 OTHER DEPRESSION: ICD-10-CM

## 2017-10-23 DIAGNOSIS — E11.65 TYPE 2 DIABETES MELLITUS WITH HYPERGLYCEMIA, WITHOUT LONG-TERM CURRENT USE OF INSULIN: Primary | ICD-10-CM

## 2017-10-23 DIAGNOSIS — E11.65 TYPE 2 DIABETES MELLITUS WITH HYPERGLYCEMIA, WITHOUT LONG-TERM CURRENT USE OF INSULIN: ICD-10-CM

## 2017-10-23 DIAGNOSIS — F41.9 ANXIETY: ICD-10-CM

## 2017-10-23 LAB
25(OH)D3+25(OH)D2 SERPL-MCNC: 18 NG/ML
CA-I BLDV-SCNC: 1.3 MMOL/L
CHOLEST SERPL-MCNC: 172 MG/DL
CHOLEST/HDLC SERPL: 2.8 {RATIO}
FSH SERPL-ACNC: 2.1 MIU/ML
HDLC SERPL-MCNC: 62 MG/DL
HDLC SERPL: 36 %
LDLC SERPL CALC-MCNC: 101.2 MG/DL
LH SERPL-ACNC: 1.7 MIU/ML
NONHDLC SERPL-MCNC: 110 MG/DL
PTH-INTACT SERPL-MCNC: 39 PG/ML
T4 FREE SERPL-MCNC: 1.1 NG/DL
TRIGL SERPL-MCNC: 44 MG/DL
TSH SERPL DL<=0.005 MIU/L-ACNC: 0.11 UIU/ML
URATE SERPL-MCNC: 3.7 MG/DL

## 2017-10-23 PROCEDURE — 84550 ASSAY OF BLOOD/URIC ACID: CPT

## 2017-10-23 PROCEDURE — 36415 COLL VENOUS BLD VENIPUNCTURE: CPT | Mod: PO

## 2017-10-23 PROCEDURE — 83970 ASSAY OF PARATHORMONE: CPT

## 2017-10-23 PROCEDURE — 99999 PR PBB SHADOW E&M-EST. PATIENT-LVL IV: CPT | Mod: PBBFAC,,, | Performed by: INTERNAL MEDICINE

## 2017-10-23 PROCEDURE — 80061 LIPID PANEL: CPT

## 2017-10-23 PROCEDURE — 83036 HEMOGLOBIN GLYCOSYLATED A1C: CPT

## 2017-10-23 PROCEDURE — 84378 SUGARS SINGLE QUANT: CPT

## 2017-10-23 PROCEDURE — 82985 ASSAY OF GLYCATED PROTEIN: CPT

## 2017-10-23 PROCEDURE — 82330 ASSAY OF CALCIUM: CPT

## 2017-10-23 PROCEDURE — 83002 ASSAY OF GONADOTROPIN (LH): CPT

## 2017-10-23 PROCEDURE — 99214 OFFICE O/P EST MOD 30 MIN: CPT | Mod: S$GLB,,, | Performed by: INTERNAL MEDICINE

## 2017-10-23 PROCEDURE — 84443 ASSAY THYROID STIM HORMONE: CPT

## 2017-10-23 PROCEDURE — 82306 VITAMIN D 25 HYDROXY: CPT

## 2017-10-23 PROCEDURE — 83001 ASSAY OF GONADOTROPIN (FSH): CPT

## 2017-10-23 PROCEDURE — 84439 ASSAY OF FREE THYROXINE: CPT

## 2017-10-23 NOTE — PROGRESS NOTES
Subjective:      Patient ID: Moris Alfaro is a 37 y.o. female.    Chief Complaint:    37 yr old lady with hypothyroidism on thyroid hormone repletion and type 2 diabetes.    History of Present Illness    Patient is a 37 yr old lady seen in [facility] on account of subclinical hypothyroidism due to Hashimoto's thyroiditis. She also has a background history of chronic migraines, obesity, early onset type 2 diabetes,  anxiety neurosis, conversion disorder and pseudoseizures in the past in addition to GERD.  Patient also has a poorly defined background past history of fibromyalgia and  had been commenced on thyroid hormone repletion with Lt4 @ 88mcg QD.  She is presently of the  metformin 500mg once DAILY for type 2 diabetes. At the present time she is entirely diet controlled. She is presently not doing SMBGs at all.  Her most recent HBA1c from 01/17 was 5.4 reflecting very good glycemic control.  She has not been checking her SMBGs frequently.  In addition she had a screening thyroid ultrasound (from 07/15) which was essentially -ve for any significant thyroid nodular disease.  Patient feels well. She is sleeping a little better now but continues to have episodic insomnia.  She is s/p CORINA for endometriosis but has one of her ovaries insitu. She has been having intermittent hot flashes and drenching sweats and also has some mouth dryness for which she takes regular free fluid. Her hormonal testing however has not showed her to be perimenopausal.  Patient is now s/p unilateral oophorectomy and unilateral fallopian tube removal.   Patient had her opthalmic screening done this year and this was normal with no evidence of retinopathy.  Patient does not receive flu vaccinations by choice. Patient has also never pneumovax again by choice.      She has lost over 20 lbs since 01/17 as shown by her vitals records below;    Vitals - 1 value per visit 1/3/2017 2/9/2017 3/14/2017 3/21/2017 3/30/2017   SYSTOLIC 114  120 114   "  DIASTOLIC 81  72 72    PULSE 80   82    TEMPERATURE        RESPIRATIONS 18   20    SPO2        Weight (lb) 152.56 152 150.13 149 150   Weight (kg) 69.2 68.947 68.1 67.586 68.04   HEIGHT 5' 4" 5' 4" 5' 4" 5' 4" 5' 4"   BODY MASS INDEX 26.19 26.09 25.77 25.58    VISIT REPORT        Waist Measurements          Vitals - 1 value per visit 3/30/2017 4/6/2017 4/20/2017 5/12/2017   SYSTOLIC 124 112  113   DIASTOLIC 76 59  77   PULSE  57  86   TEMPERATURE  98     RESPIRATIONS  16     SPO2  98     Weight (lb) 149.91  143.52 142.42   Weight (kg) 68  65.1 64.6   HEIGHT 5' 4"  5' 4" 5' 4"   BODY MASS INDEX 25.73 25.75 24.64 24.45   VISIT REPORT       Waist Measurements         Vitals - 1 value per visit 10/23/2017 10/23/2017   SYSTOLIC 108    DIASTOLIC 74    PULSE 82    TEMPERATURE 98.3    RESPIRATIONS 18    SPO2     Weight (lb) 127.21    Weight (kg) 57.7    HEIGHT 5' 4"    BODY MASS INDEX 21.83    VISIT REPORT     Waist Measurements  27.25      She has no fresh complaints today.    Review of Systems   Constitutional: Negative for diaphoresis and fatigue.   HENT: Negative for facial swelling, trouble swallowing and voice change.    Eyes: Negative for visual disturbance.   Respiratory: Negative for cough and shortness of breath.    Cardiovascular: Negative for chest pain, palpitations and leg swelling.   Gastrointestinal: Negative for abdominal pain, diarrhea, nausea and vomiting.   Endocrine: Negative for polyuria.   Genitourinary: Negative for flank pain, frequency and menstrual problem (S/P CORINA and unilateral oophorectomy).   Musculoskeletal: Negative for arthralgias, back pain and myalgias.   Skin: Negative for color change, pallor and rash.   Neurological: Negative for dizziness, numbness and headaches.   Hematological: Does not bruise/bleed easily.   Psychiatric/Behavioral: Negative for confusion. The patient is not nervous/anxious.        Objective: /74 (BP Location: Left arm, Patient Position: Sitting, BP Method: " "Medium (Automatic))   Pulse 82   Temp 98.3 °F (36.8 °C) (Oral)   Resp 18   Ht 5' 4" (1.626 m)   Wt 57.7 kg (127 lb 3.3 oz)   BMI 21.83 kg/m²   Waist circ; 27.25" neck circ; 13" hip circ; 35"  Waist to hip ratio; 0.77     Physical Exam   Constitutional: She is oriented to person, place, and time. She appears well-developed and well-nourished. No distress.   Pleasant young lady. Not pale, anicteric and afebrile. Well hydrated. Not in any apparent acute distress. She has visibly lost a significant amount of weight since her last clinical review.   HENT:   Head: Normocephalic and atraumatic.   Mouth/Throat: Oropharynx is clear and moist. No oropharyngeal exudate.   Eyes: Conjunctivae and EOM are normal. Pupils are equal, round, and reactive to light. No scleral icterus.   Neck: Normal range of motion. Neck supple. No JVD present. No thyromegaly present.   Mild non tender thyromegaly today. No cervical adenopathy   Cardiovascular: Normal rate, regular rhythm and normal heart sounds.    No murmur heard.  Pulmonary/Chest: Effort normal and breath sounds normal. No respiratory distress. She has no wheezes.   Abdominal: Soft.   Musculoskeletal: Normal range of motion. She exhibits no edema.   Lymphadenopathy:     She has no cervical adenopathy.   Neurological: She is alert and oriented to person, place, and time. No cranial nerve deficit.   Skin: Skin is warm and dry. No rash noted. She is not diaphoretic. No erythema. No pallor.   Patches and macules on lateral aspects of both thighs.  Mildly scaly, hypopigmented.   Psychiatric: She has a normal mood and affect. Her behavior is normal. Judgment and thought content normal.   Vitals reviewed.      Lab Review:     Results for STERLINGMaikelNILSON WADEAN ALICIA (MRN [id number]) as of 10/23/2017 14:31   Ref. Range 5/12/2017 10:10 5/12/2017 10:29   WBC Latest Ref Range: 3.90 - 12.70 K/uL  5.11   RBC Latest Ref Range: 4.00 - 5.40 M/uL  4.90   Hemoglobin Latest Ref Range: 12.0 - 16.0 g/dL  " 13.9   Hematocrit Latest Ref Range: 37.0 - 48.5 %  40.8   MCV Latest Ref Range: 82 - 98 fL  83   MCH Latest Ref Range: 27.0 - 31.0 pg  28.4   MCHC Latest Ref Range: 32.0 - 36.0 %  34.1   RDW Latest Ref Range: 11.5 - 14.5 %  13.1   Platelets Latest Ref Range: 150 - 350 K/uL  278   MPV Latest Ref Range: 9.2 - 12.9 fL  11.0   Gran% Latest Ref Range: 38.0 - 73.0 %  48.9   Gran # Latest Ref Range: 1.8 - 7.7 K/uL  2.5   Lymph% Latest Ref Range: 18.0 - 48.0 %  41.3   Lymph # Latest Ref Range: 1.0 - 4.8 K/uL  2.1   Mono% Latest Ref Range: 4.0 - 15.0 %  8.4   Mono # Latest Ref Range: 0.3 - 1.0 K/uL  0.4   Eosinophil% Latest Ref Range: 0.0 - 8.0 %  1.0   Eos # Latest Ref Range: 0.0 - 0.5 K/uL  0.1   Basophil% Latest Ref Range: 0.0 - 1.9 %  0.2   Baso # Latest Ref Range: 0.00 - 0.20 K/uL  0.01   Sodium Latest Ref Range: 136 - 145 mmol/L  136   Potassium Latest Ref Range: 3.5 - 5.1 mmol/L  3.9   Chloride Latest Ref Range: 95 - 110 mmol/L  105   CO2 Latest Ref Range: 23 - 29 mmol/L  23   Anion Gap Latest Ref Range: 8 - 16 mmol/L  8   BUN, Bld Latest Ref Range: 6 - 20 mg/dL  7   Creatinine Latest Ref Range: 0.5 - 1.4 mg/dL  0.9   eGFR if non African American Latest Ref Range: >60 mL/min/1.73 m^2  >60.0   eGFR if African American Latest Ref Range: >60 mL/min/1.73 m^2  >60.0   Glucose Latest Ref Range: 70 - 110 mg/dL  93   Calcium Latest Ref Range: 8.7 - 10.5 mg/dL  9.8   Alkaline Phosphatase Latest Ref Range: 55 - 135 U/L  52 (L)   Total Protein Latest Ref Range: 6.0 - 8.4 g/dL  7.6   Albumin Latest Ref Range: 3.5 - 5.2 g/dL  4.0   Uric Acid Latest Ref Range: 2.4 - 5.7 mg/dL  3.7   Total Bilirubin Latest Ref Range: 0.1 - 1.0 mg/dL  0.2   AST Latest Ref Range: 10 - 40 U/L  11   ALT Latest Ref Range: 10 - 44 U/L  11   Amylase Latest Ref Range: 20 - 110 U/L  93   Lipase Latest Ref Range: 4 - 60 U/L  58   Triglycerides Latest Ref Range: 30 - 150 mg/dL  174 (H)   Cholesterol Latest Ref Range: 120 - 199 mg/dL  235 (H)   HDL Latest Ref  Range: 40 - 75 mg/dL  47   LDL Cholesterol Latest Ref Range: 63.0 - 159.0 mg/dL  153.2   Total Cholesterol/HDL Ratio Latest Ref Range: 2.0 - 5.0   5.0   Vit D, 25-Hydroxy Latest Ref Range: 30 - 96 ng/mL  27 (L)   Hemoglobin A1C Latest Ref Range: 4.5 - 6.2 %  5.6   Estimated Avg Glucose Latest Ref Range: 68 - 131 mg/dL  114   GlycoMark (TM) Latest Units: ug/mL  20.9   TSH Latest Ref Range: 0.400 - 4.000 uIU/mL  1.089   T3, Total Latest Ref Range: 60 - 180 ng/dL  85   Free T4 Latest Ref Range: 0.71 - 1.51 ng/dL  1.10   Thyroglobulin Interpretation Unknown  SEE BELOW   Thyroglobulin Antibody Screen Latest Ref Range: <4.0 IU/mL  <1.8   Thyroglobulin, Tumor Marker Latest Units: ng/mL  13 (H)   Specimen UA Unknown Urine, Clean Catc...    Color, UA Latest Ref Range: Yellow, Straw, Maddy  Straw    pH, UA Latest Ref Range: 5.0 - 8.0  6.0    Specific Gravity, UA Latest Ref Range: 1.005 - 1.030  1.005    Appearance, UA Latest Ref Range: Clear  Clear    Protein, UA Latest Ref Range: Negative  Negative    Glucose, UA Latest Ref Range: Negative  Negative    Ketones, UA Latest Ref Range: Negative  Negative    Occult Blood UA Latest Ref Range: Negative  Negative    Nitrite, UA Latest Ref Range: Negative  Negative    Urobilinogen, UA Latest Ref Range: <2.0 EU/dL Negative    Bilirubin (UA) Latest Ref Range: Negative  Negative    Leukocytes, UA Latest Ref Range: Negative  Negative    Microalbum.,U,Random Latest Units: ug/mL <2.5    Microalb Creat Ratio Latest Ref Range: 0.0 - 30.0 ug/mg Unable to calculate    Fructosamine Latest Ref Range: 0 - 285 umol /L  208   Creatinine, Random Ur Latest Ref Range: 15.0 - 325.0 mg/dL 41.0    Differential Method Unknown  Automated   HDL/Chol Ratio Latest Ref Range: 20.0 - 50.0 %  20.0   Non-HDL Cholesterol Latest Units: mg/dL  188       Assessment:     1. Type 2 diabetes mellitus with hyperglycemia, without long-term current use of insulin  GlycoMark (TM)    Hemoglobin A1c    Fructosamine    Uric  acid   2. Hypothyroidism (acquired)  Lipid panel    Uric acid    Luteinizing hormone    Follicle stimulating hormone    TSH    T4, free   3. Hyperlipidemia, unspecified hyperlipidemia type  Lipid panel   4. Hypercholesterolemia  Lipid panel   5. Other depression     6. Conversion disorder     7. Anxiety     8. Attention deficit hyperactivity disorder (ADHD), unspecified ADHD type     9. Other migraine without status migrainosus, not intractable     10. Thyroiditis     11. GERD without esophagitis     12. Fibromyalgia     13. Hypovitaminosis D  Vitamin D    Calcium, ionized    PTH, intact   14. H/O abdominal hysterectomy  Luteinizing hormone    Follicle stimulating hormone        Regarding hypothyroidism; Patient appears clinically euthyroid. To continue LT4 at present dose and may adjust LT4 dose based on results  Regarding early diabetes; She is to ensure SMBG checks 2-3 x weekly and remain of metformin for now. To also recheck HBA1c.  To continue strict low glycemic diet adherence as before.   Regarding GERD; symptomatically stable on PPI therapy.  Regarding hypovitaminosis D; to continue vitamin D supplementation and will recheck 25OH vitamin d levels.  Regarding depression; mood stable. To continue Effexor XR as before    Plan:       FFup in ~ 3mths

## 2017-10-24 DIAGNOSIS — E03.4 HYPOTHYROIDISM DUE TO ACQUIRED ATROPHY OF THYROID: ICD-10-CM

## 2017-10-24 DIAGNOSIS — R94.6 ABNORMAL THYROID FUNCTION TEST: ICD-10-CM

## 2017-10-24 DIAGNOSIS — E55.9 HYPOVITAMINOSIS D: ICD-10-CM

## 2017-10-24 LAB
ESTIMATED AVG GLUCOSE: 103 MG/DL
HBA1C MFR BLD HPLC: 5.2 %

## 2017-10-24 RX ORDER — ERGOCALCIFEROL 1.25 MG/1
CAPSULE ORAL
Qty: 24 CAPSULE | Refills: 3 | Status: SHIPPED | OUTPATIENT
Start: 2017-10-24 | End: 2018-11-05 | Stop reason: SDUPTHER

## 2017-10-24 RX ORDER — LEVOTHYROXINE SODIUM 75 UG/1
75 TABLET ORAL DAILY
Qty: 90 TABLET | Refills: 3 | Status: SHIPPED | OUTPATIENT
Start: 2017-10-24 | End: 2018-11-06 | Stop reason: SDUPTHER

## 2017-10-27 LAB
FRUCTOSAMINE SERPL-SCNC: 192 UMOL /L (ref 0–285)
GLYCOMARK (TM): 19 UG/ML

## 2017-10-30 ENCOUNTER — PATIENT MESSAGE (OUTPATIENT)
Dept: ENDOCRINOLOGY | Facility: CLINIC | Age: 37
End: 2017-10-30

## 2017-11-08 ENCOUNTER — PATIENT MESSAGE (OUTPATIENT)
Dept: OBSTETRICS AND GYNECOLOGY | Facility: CLINIC | Age: 37
End: 2017-11-08

## 2017-11-08 ENCOUNTER — OFFICE VISIT (OUTPATIENT)
Dept: OBSTETRICS AND GYNECOLOGY | Facility: CLINIC | Age: 37
End: 2017-11-08
Payer: COMMERCIAL

## 2017-11-08 ENCOUNTER — HOSPITAL ENCOUNTER (OUTPATIENT)
Dept: RADIOLOGY | Facility: HOSPITAL | Age: 37
Discharge: HOME | End: 2017-11-08
Attending: SPECIALIST
Payer: COMMERCIAL

## 2017-11-08 VITALS
BODY MASS INDEX: 22.06 KG/M2 | WEIGHT: 129.19 LBS | SYSTOLIC BLOOD PRESSURE: 114 MMHG | DIASTOLIC BLOOD PRESSURE: 60 MMHG | HEIGHT: 64 IN

## 2017-11-08 DIAGNOSIS — R10.2 PELVIC PAIN: ICD-10-CM

## 2017-11-08 DIAGNOSIS — R10.2 PELVIC PAIN: Primary | ICD-10-CM

## 2017-11-08 PROCEDURE — 99999 PR PBB SHADOW E&M-EST. PATIENT-LVL IV: CPT | Mod: PBBFAC,,, | Performed by: SPECIALIST

## 2017-11-08 PROCEDURE — 99213 OFFICE O/P EST LOW 20 MIN: CPT | Mod: S$GLB,,, | Performed by: SPECIALIST

## 2017-11-08 PROCEDURE — 76856 US EXAM PELVIC COMPLETE: CPT | Mod: 26,,, | Performed by: RADIOLOGY

## 2017-11-08 PROCEDURE — 76856 US EXAM PELVIC COMPLETE: CPT | Mod: TC

## 2017-11-08 PROCEDURE — 76830 TRANSVAGINAL US NON-OB: CPT | Mod: 26,,, | Performed by: RADIOLOGY

## 2017-11-08 NOTE — PROGRESS NOTES
"38 yo BF presents with complaint abdominal wall " muscle spasm" left side around surgical site as well as intermittent right adnexal pain over several weeks. Pt denies f/c, flank pain, dysuria, hematuria, weight loss/gain.  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    Thyroid disease     Toe fracture        Past Surgical History:   Procedure Laterality Date    dx lap      ecsi      HYSTERECTOMY  2007    CORINA due to DUB,  ovaries intact    lumbar facet injection      NE EXPLORATORY OF ABDOMEN      RHIZOTOMY      TUBAL LIGATION         Family History   Problem Relation Age of Onset    Thyroid disease Mother     Heart disease Father     Hypertension Father     Pancreatitis Father     Diabetes Father     Stomach cancer Paternal Aunt     Stomach cancer Paternal Uncle     Cancer Paternal Uncle      lung cancer    Melanoma Neg Hx     Psoriasis Neg Hx     Lupus Neg Hx     Eczema Neg Hx        Social History     Social History    Marital status: Legally      Spouse name: N/A    Number of children: N/A    Years of education: N/A     Social History Main Topics    Smoking status: Never Smoker    Smokeless tobacco: Never Used    Alcohol use Yes      Comment: OCCASIONALLY    Drug use: No    Sexual activity: Yes     Partners: Male     Birth control/ protection: None     Other Topics Concern    None     Social History Narrative    None       Current Outpatient Prescriptions   Medication Sig Dispense Refill    blood sugar diagnostic Strp 1 strip by Misc.(Non-Drug; Combo Route) route once daily. One Touch Verio 100 strip 3    cyclobenzaprine (FLEXERIL) " 10 MG tablet Take 10 mg by mouth 2 (two) times daily.       dextroamphetamine-amphetamine (ADDERALL) 20 mg tablet Take 1 tablet by mouth 2 (two) times daily.       diltiaZEM (CARDIZEM CD) 180 MG 24 hr capsule Take 180 mg by mouth once daily.      ergocalciferol (ERGOCALCIFEROL) 50,000 unit Cap Take one capsule twice weekly 24 capsule 3    gabapentin (NEURONTIN) 800 MG tablet Take 800 mg by mouth 3 (three) times daily.  3    lancets Misc 1 Device by Misc.(Non-Drug; Combo Route) route once daily. One touch verio 90 each 3    levothyroxine (SYNTHROID) 75 MCG tablet Take 1 tablet (75 mcg total) by mouth once daily. 90 tablet 3    omeprazole (PRILOSEC) 40 MG capsule TAKE ONE CAPSULE BY MOUTH EVERY DAY 90 capsule 3    pravastatin (PRAVACHOL) 20 MG tablet Take 1 tablet (20 mg total) by mouth once daily. 90 tablet 3    propranolol (INDERAL LA) 80 MG 24 hr capsule Take 1 capsule (80 mg total) by mouth once daily. 30 capsule 4    rizatriptan (MAXALT) 10 MG tablet Take 1 tablet (10 mg total) by mouth as needed for Migraine. 9 tablet 0    topiramate (TOPAMAX) 50 MG tablet Take 1 tablet (50 mg total) by mouth 2 (two) times daily. 60 tablet 11    venlafaxine (EFFEXOR-XR) 150 MG Cp24 Take 150 mg by mouth once daily.       zolpidem (AMBIEN CR) 12.5 MG CR tablet Take 1 tablet by mouth nightly as needed.  3     No current facility-administered medications for this visit.        Review of patient's allergies indicates:   Allergen Reactions    Metronidazole Hives and Itching       PE:   APPEARANCE: Well nourished, well developed, in no acute distress.  NECK: Neck symmetric without masses or thyromegaly.  NODES: No inguinal lymph node enlargement.  ABDOMEN: Soft. No tenderness or masses. No hepatosplenomegaly. No hernias.  BREASTS: Symmetrical, no skin changes or visible lesions. No palpable masses, nipple discharge or adenopathy bilaterally.  PELVIC: Normal external female genitalia without lesions. Normal hair  distribution. Adequate perineal body, normal urethral meatus. Vagina moist and well rugated without lesions or discharge. No significant cystocele or rectocele. Uterus and cervix surgically absent. Bimanual exam revealed no masses, POSITIVE FULLNESS AND TENDERNESS RIGHT ADNEXA      I discussed hx of adhesions as well as simple ovarian cysts and possible recurrence  I rec pelvic u/s and pending the above, possible exploration and RSO  Will follow results

## 2017-11-27 RX ORDER — ZOLPIDEM TARTRATE 12.5 MG/1
TABLET, FILM COATED, EXTENDED RELEASE ORAL
Qty: 30 TABLET | Refills: 0 | Status: SHIPPED | OUTPATIENT
Start: 2017-11-27 | End: 2018-02-20 | Stop reason: SDUPTHER

## 2017-12-03 RX ORDER — CYCLOBENZAPRINE HCL 10 MG
TABLET ORAL
Qty: 60 TABLET | Refills: 0 | Status: SHIPPED | OUTPATIENT
Start: 2017-12-03 | End: 2018-02-14 | Stop reason: SDUPTHER

## 2017-12-30 RX ORDER — GABAPENTIN 800 MG/1
TABLET ORAL
Qty: 90 TABLET | Refills: 0 | Status: SHIPPED | OUTPATIENT
Start: 2017-12-30 | End: 2018-02-20 | Stop reason: SDUPTHER

## 2018-02-01 RX ORDER — GABAPENTIN 800 MG/1
TABLET ORAL
Qty: 90 TABLET | Refills: 0 | OUTPATIENT
Start: 2018-02-01

## 2018-02-07 ENCOUNTER — LAB VISIT (OUTPATIENT)
Dept: LAB | Facility: HOSPITAL | Age: 38
End: 2018-02-07
Attending: INTERNAL MEDICINE
Payer: COMMERCIAL

## 2018-02-07 ENCOUNTER — OFFICE VISIT (OUTPATIENT)
Dept: ENDOCRINOLOGY | Facility: CLINIC | Age: 38
End: 2018-02-07
Payer: COMMERCIAL

## 2018-02-07 VITALS
SYSTOLIC BLOOD PRESSURE: 120 MMHG | HEART RATE: 91 BPM | BODY MASS INDEX: 24.24 KG/M2 | WEIGHT: 142 LBS | DIASTOLIC BLOOD PRESSURE: 81 MMHG | HEIGHT: 64 IN

## 2018-02-07 DIAGNOSIS — E55.9 HYPOVITAMINOSIS D: ICD-10-CM

## 2018-02-07 DIAGNOSIS — F32.89 OTHER DEPRESSION: ICD-10-CM

## 2018-02-07 DIAGNOSIS — E03.4 HYPOTHYROIDISM DUE TO ACQUIRED ATROPHY OF THYROID: ICD-10-CM

## 2018-02-07 DIAGNOSIS — E78.5 HYPERLIPIDEMIA, UNSPECIFIED HYPERLIPIDEMIA TYPE: ICD-10-CM

## 2018-02-07 DIAGNOSIS — E11.65 TYPE 2 DIABETES MELLITUS WITH HYPERGLYCEMIA, WITHOUT LONG-TERM CURRENT USE OF INSULIN: ICD-10-CM

## 2018-02-07 DIAGNOSIS — R94.6 ABNORMAL THYROID FUNCTION TEST: ICD-10-CM

## 2018-02-07 DIAGNOSIS — E03.9 HYPOTHYROIDISM (ACQUIRED): Primary | ICD-10-CM

## 2018-02-07 DIAGNOSIS — E03.9 HYPOTHYROIDISM (ACQUIRED): ICD-10-CM

## 2018-02-07 LAB
25(OH)D3+25(OH)D2 SERPL-MCNC: 42 NG/ML
ESTIMATED AVG GLUCOSE: 94 MG/DL
HBA1C MFR BLD HPLC: 4.9 %
T3 SERPL-MCNC: 75 NG/DL
T3FREE SERPL-MCNC: 2.3 PG/ML
T4 FREE SERPL-MCNC: 0.92 NG/DL
T4 FREE SERPL-MCNC: 0.92 NG/DL
TSH SERPL DL<=0.005 MIU/L-ACNC: 0.93 UIU/ML
TSH SERPL DL<=0.005 MIU/L-ACNC: 0.93 UIU/ML

## 2018-02-07 PROCEDURE — 82985 ASSAY OF GLYCATED PROTEIN: CPT

## 2018-02-07 PROCEDURE — 84481 FREE ASSAY (FT-3): CPT

## 2018-02-07 PROCEDURE — 84439 ASSAY OF FREE THYROXINE: CPT

## 2018-02-07 PROCEDURE — 84480 ASSAY TRIIODOTHYRONINE (T3): CPT

## 2018-02-07 PROCEDURE — 83036 HEMOGLOBIN GLYCOSYLATED A1C: CPT

## 2018-02-07 PROCEDURE — 99999 PR PBB SHADOW E&M-EST. PATIENT-LVL III: CPT | Mod: PBBFAC,,, | Performed by: PHYSICIAN ASSISTANT

## 2018-02-07 PROCEDURE — 84443 ASSAY THYROID STIM HORMONE: CPT

## 2018-02-07 PROCEDURE — 84378 SUGARS SINGLE QUANT: CPT

## 2018-02-07 PROCEDURE — 99213 OFFICE O/P EST LOW 20 MIN: CPT | Mod: S$GLB,,, | Performed by: PHYSICIAN ASSISTANT

## 2018-02-07 PROCEDURE — 3008F BODY MASS INDEX DOCD: CPT | Mod: S$GLB,,, | Performed by: PHYSICIAN ASSISTANT

## 2018-02-07 PROCEDURE — 82306 VITAMIN D 25 HYDROXY: CPT

## 2018-02-07 NOTE — PROGRESS NOTES
I have reviewed the notes, assessments, and/or procedures performed by Slime Angel PA-C. I agree with the documented clinical case summary and the suggested management plan.

## 2018-02-07 NOTE — PROGRESS NOTES
"Subjective:      Patient ID: Moris Alfaro is a 38 y.o. female.    Chief Complaint:    38 y.o.  lady with hypothyroidism on thyroid hormone repletion and type 2 diabetes.    History of Present IllnessMoris Alfaro is a 38 y.o. female here for account of subclinical hypothyroidism due to Hashimoto's thyroiditis along with other conditions listed in the Visit Diagnosis. +Fhx of DM in father and hypothyroism in her mom. Father has pancreatic cancer.     New to me today.    PMhx, PShx:   She is taking Lt4 75 mcg QD.    She is diet controlled and does not check her blood sugar.    Her most recent HBA1c from 10/17 was 5.2 reflecting very good glycemic control.  No BG checks.    Exercise: No    Diet:  BF-skips  LH-skips  DN- pasta, pork chops, she cooks the majority of the time.  Snacks on granola bars. Avoids sugary beverages.    She had a screening thyroid ultrasound (from 07/15) that was negative thyroid nodular disease.    She is s/p CORINA for endometriosis but has one of her ovaries insitu.     Hot flashes and night sweats have stopped. Her hormonal testing has not showed her to be perimenopausal.    Last eye exam: 6/2017    Review of Systems  ROS:   Constitutional: decreased energy, + wt gain  Eyes: +dry eyes, No recent visual changes  Cardiovascular:+ palpitations (seeing cardiology),no CP  Respiratory: Denies current respiratory difficulty  Gastrointestinal: Denies recent bowel disturbances  GenitoUrinary - No dysuria  Skin: No new skin rash  Musc: muscle aches (fibromyalgia)  Neurologic: + numbness in left hallux  Remainder ROS negative     Objective: /81   Pulse 91   Ht 5' 4" (1.626 m)   Wt 64.4 kg (141 lb 15.6 oz)   BMI 24.37 kg/m²      Physical Exam   Constitutional: She is oriented to person, place, and time. She appears well-developed and well-nourished. No distress.   Sitting comfortably in chair. Not pale, anicteric and afebrile. Well hydrated. Not in any apparent acute distress.  "   HENT:   Head: Normocephalic and atraumatic.   Mouth/Throat: Oropharynx is clear and moist. No oropharyngeal exudate.   Eyes: Conjunctivae and EOM are normal. Pupils are equal, round, and reactive to light. No scleral icterus.   Neck: Normal range of motion. Neck supple. No JVD present. No thyromegaly present.   Cardiovascular: Normal rate, regular rhythm and normal heart sounds.    No murmur heard.  Pulmonary/Chest: Effort normal and breath sounds normal. No respiratory distress. She has no wheezes.   Abdominal: Soft. She exhibits no distension.   Musculoskeletal: Normal range of motion. She exhibits no edema.   Lymphadenopathy:     She has no cervical adenopathy.   Neurological: She is alert and oriented to person, place, and time. She displays normal reflexes. No cranial nerve deficit.   Skin: Skin is warm and dry. No rash noted. She is not diaphoretic. No erythema. No pallor.   Psychiatric: She has a normal mood and affect. Her behavior is normal. Judgment and thought content normal.   Vitals reviewed.    Lab Review:     Results for LILLIAM JEAN (MRN [id number]) as of 2/7/2018 09:27   Ref. Range 10/23/2017 15:51   Calcium, Ion Latest Ref Range: 1.06 - 1.42 mmol/L 1.30   Uric Acid Latest Ref Range: 2.4 - 5.7 mg/dL 3.7   Triglycerides Latest Ref Range: 30 - 150 mg/dL 44   Cholesterol Latest Ref Range: 120 - 199 mg/dL 172   HDL Latest Ref Range: 40 - 75 mg/dL 62   LDL Cholesterol Latest Ref Range: 63.0 - 159.0 mg/dL 101.2   Total Cholesterol/HDL Ratio Latest Ref Range: 2.0 - 5.0  2.8   Vit D, 25-Hydroxy Latest Ref Range: 30 - 96 ng/mL 18 (L)   Hemoglobin A1C Latest Ref Range: 4.0 - 5.6 % 5.2   Estimated Avg Glucose Latest Ref Range: 68 - 131 mg/dL 103   GlycoMark (TM) Latest Units: ug/mL 19.0   TSH Latest Ref Range: 0.400 - 4.000 uIU/mL 0.108 (L)   Free T4 Latest Ref Range: 0.71 - 1.51 ng/dL 1.10   PTH Latest Ref Range: 9.0 - 77.0 pg/mL 39.0   FSH Latest Ref Range: See Text mIU/mL 2.10   LH Latest Ref Range:  See Text mIU/mL 1.7     The 10-year ASCVD risk score (Raz CUEVAS Jr., et al., 2013) is: 0.6%    Values used to calculate the score:      Age: 38 years      Sex: Female      Is Non- : Yes      Diabetic: Yes      Tobacco smoker: No      Systolic Blood Pressure: 120 mmHg      Is BP treated: No      HDL Cholesterol: 62 mg/dL      Total Cholesterol: 172 mg/dL     Assessment:     1. Hypothyroidism (acquired)  T4, free    T3    TSH   2. Hyperlipidemia, unspecified hyperlipidemia type     3. Hypovitaminosis D  Vitamin D   4. Type 2 diabetes mellitus with hyperglycemia, without long-term current use of insulin  Hemoglobin A1c    GlycoMark (TM)    Fructosamine      Hypothyroidism  Chronic-stable-TFTs  Continue levothyroxine 75 mcg      Hyperlipidemia  Chronic-stable-continue statin    Type 2 DM  Continue diet  Check BG 3x weekly.  Logs to all visits.  Disscused foot care.    Hypovitaminosis D  Continue vitamin D supplementation and will recheck level.    Depression  Continue Effexor XR   Referral to psychology    Plan:   FFup in ~ 3 mths

## 2018-02-09 LAB — FRUCTOSAMINE SERPL-SCNC: 191 UMOL /L (ref 0–285)

## 2018-02-11 LAB — GLYCOMARK (TM): 16.7 UG/ML

## 2018-02-14 RX ORDER — CYCLOBENZAPRINE HCL 10 MG
TABLET ORAL
Qty: 60 TABLET | Refills: 0 | Status: SHIPPED | OUTPATIENT
Start: 2018-02-14 | End: 2018-02-20 | Stop reason: SDUPTHER

## 2018-02-15 DIAGNOSIS — R53.1 LEFT-SIDED WEAKNESS: Primary | ICD-10-CM

## 2018-02-15 RX ORDER — PROPRANOLOL HYDROCHLORIDE 80 MG/1
80 CAPSULE, EXTENDED RELEASE ORAL DAILY
Qty: 30 CAPSULE | Refills: 4 | Status: SHIPPED | OUTPATIENT
Start: 2018-02-15 | End: 2019-01-17

## 2018-02-20 ENCOUNTER — PATIENT MESSAGE (OUTPATIENT)
Dept: OBSTETRICS AND GYNECOLOGY | Facility: CLINIC | Age: 38
End: 2018-02-20

## 2018-02-20 ENCOUNTER — OFFICE VISIT (OUTPATIENT)
Dept: RHEUMATOLOGY | Facility: CLINIC | Age: 38
End: 2018-02-20
Payer: COMMERCIAL

## 2018-02-20 VITALS
BODY MASS INDEX: 24.07 KG/M2 | SYSTOLIC BLOOD PRESSURE: 126 MMHG | WEIGHT: 141 LBS | DIASTOLIC BLOOD PRESSURE: 90 MMHG | HEIGHT: 64 IN

## 2018-02-20 DIAGNOSIS — M79.7 FIBROMYALGIA: Primary | ICD-10-CM

## 2018-02-20 LAB
ALBUMIN SERPL-MCNC: 4 G/DL (ref 3.1–4.7)
ALP SERPL-CCNC: 35 IU/L (ref 40–104)
ALT (SGPT): 21 IU/L (ref 3–33)
AST SERPL-CCNC: 22 IU/L (ref 10–40)
BASOPHILS NFR BLD: 0 K/UL (ref 0–0.2)
BASOPHILS NFR BLD: 0.4 %
BILIRUB SERPL-MCNC: 0.5 MG/DL (ref 0.3–1)
BUN SERPL-MCNC: 11 MG/DL (ref 8–20)
CALCIUM SERPL-MCNC: 9.1 MG/DL (ref 7.7–10.4)
CHLORIDE: 108 MMOL/L (ref 98–110)
CO2 SERPL-SCNC: 22.2 MMOL/L (ref 22.8–31.6)
CREATININE: 0.7 MG/DL (ref 0.6–1.4)
EOSINOPHIL NFR BLD: 0 K/UL (ref 0–0.7)
EOSINOPHIL NFR BLD: 0.6 %
ERYTHROCYTE [DISTWIDTH] IN BLOOD BY AUTOMATED COUNT: 12.6 % (ref 11.7–14.9)
GLUCOSE: 80 MG/DL (ref 70–99)
GRAN #: 2.4 K/UL (ref 1.4–6.5)
GRAN%: 49.5 %
HCT VFR BLD AUTO: 38.8 % (ref 36–48)
HGB BLD-MCNC: 12.5 G/DL (ref 12–15)
IMMATURE GRANS (ABS): 0 K/UL (ref 0–1)
IMMATURE GRANULOCYTES: 0.8 %
LYMPH #: 1.9 K/UL (ref 1.2–3.4)
LYMPH%: 38.5 %
MCH RBC QN AUTO: 28.7 PG (ref 25–35)
MCHC RBC AUTO-ENTMCNC: 32.2 G/DL (ref 31–36)
MCV RBC AUTO: 89.2 FL (ref 79–98)
MONO #: 0.5 K/UL (ref 0.1–0.6)
MONO%: 10.2 %
NUCLEATED RBCS: 0 %
PLATELET # BLD AUTO: 246 K/UL (ref 140–440)
PMV BLD AUTO: 10.4 FL (ref 8.8–12.7)
POTASSIUM SERPL-SCNC: 4.1 MMOL/L (ref 3.5–5)
PROT SERPL-MCNC: 7 G/DL (ref 6–8.2)
RBC # BLD AUTO: 4.35 M/UL (ref 3.5–5.5)
SODIUM: 137 MMOL/L (ref 134–144)
WBC # BLD AUTO: 4.8 K/UL (ref 5–10)

## 2018-02-20 PROCEDURE — 3008F BODY MASS INDEX DOCD: CPT | Mod: ,,, | Performed by: INTERNAL MEDICINE

## 2018-02-20 PROCEDURE — 99213 OFFICE O/P EST LOW 20 MIN: CPT | Mod: ,,, | Performed by: INTERNAL MEDICINE

## 2018-02-20 RX ORDER — ZOLPIDEM TARTRATE 12.5 MG/1
TABLET, FILM COATED, EXTENDED RELEASE ORAL
Qty: 30 TABLET | Refills: 0 | Status: SHIPPED | OUTPATIENT
Start: 2018-02-20 | End: 2018-06-07

## 2018-02-20 RX ORDER — CYCLOBENZAPRINE HCL 10 MG
TABLET ORAL
Qty: 60 TABLET | Refills: 3 | Status: SHIPPED | OUTPATIENT
Start: 2018-02-20 | End: 2020-10-06 | Stop reason: SDUPTHER

## 2018-02-20 RX ORDER — GABAPENTIN 800 MG/1
800 TABLET ORAL 3 TIMES DAILY
Qty: 90 TABLET | Refills: 0 | Status: SHIPPED | OUTPATIENT
Start: 2018-02-20 | End: 2019-04-23

## 2018-02-20 NOTE — PROGRESS NOTES
[facility] RHEUMATOLOGY            PROGRESS NOTE      Subjective:       Patient ID:   NAME: Moris Alfaro : 1980     38 y.o. female    Referring Doc: No ref. provider found  Other Physicians:    Chief Complaint:  No chief complaint on file.      History of Present Illness:     Patient returns today for a regularly scheduled follow-up visit for   Fibromyalgia    The patient is overall doing well except for neck and low back pain. Some spasms. No history of trauma. No joint swelling            ROS:   GEN:  No  fever, night sweats . weight is stable   + fatigue  SKIN: no rashes, no bruising, no ulcerations, no Raynaud's  HEENT: no HA's, No visual changes, no mucosal ulcers, no sicca symptoms,  CV:   no CP, SOB, PND, ZUÑIGA, no orthopnea, no palpitations  PULM: normal with no SOB, cough, hemoptysis, sputum or pleuritic pain  GI:  no abdominal pain, nausea, vomiting, constipation, diarrhea, melanotic stools, BRBPR, hematemesis, no dysphagia  :   no dysuria  NEURO: no paresthesias, headaches, visual disturbances, muscle weakness  MUSCULOSKELETAL:no joint swelling, prolonged AM stiffness, + back pain, + muscle pain  Allergies:  Review of patient's allergies indicates:   Allergen Reactions    Metronidazole Hives and Itching       Medications:    Current Outpatient Prescriptions:     cyclobenzaprine (FLEXERIL) 10 MG tablet, TAKE TO 1 TABLET BY MOUTH TWICE DAILY AS NEEDED, Disp: 60 tablet, Rfl: 3    dextroamphetamine-amphetamine (ADDERALL) 20 mg tablet, Take 1 tablet by mouth 2 (two) times daily. , Disp: , Rfl:     diltiaZEM (CARDIZEM CD) 180 MG 24 hr capsule, Take 180 mg by mouth once daily., Disp: , Rfl:     ergocalciferol (ERGOCALCIFEROL) 50,000 unit Cap, Take one capsule twice weekly, Disp: 24 capsule, Rfl: 3    gabapentin (NEURONTIN) 800 MG tablet, Take 1 tablet (800 mg total) by mouth 3 (three) times daily., Disp: 90 tablet, Rfl: 0    omeprazole (PRILOSEC) 40 MG capsule, TAKE ONE CAPSULE BY MOUTH  "EVERY DAY, Disp: 90 capsule, Rfl: 3    propranolol (INDERAL LA) 80 MG 24 hr capsule, Take 1 capsule (80 mg total) by mouth once daily., Disp: 30 capsule, Rfl: 4    venlafaxine (EFFEXOR-XR) 150 MG Cp24, Take 150 mg by mouth once daily. , Disp: , Rfl:     zolpidem (AMBIEN CR) 12.5 MG CR tablet, TAKE 1 TABLET BY MOUTH EVERY NIGHT AT BEDTIME AS NEEDED, Disp: 30 tablet, Rfl: 0    blood sugar diagnostic Strp, 1 strip by Misc.(Non-Drug; Combo Route) route once daily. One Touch Verio, Disp: 100 strip, Rfl: 3    lancets Misc, 1 Device by Misc.(Non-Drug; Combo Route) route once daily. One touch verio, Disp: 90 each, Rfl: 3    levothyroxine (SYNTHROID) 75 MCG tablet, Take 1 tablet (75 mcg total) by mouth once daily., Disp: 90 tablet, Rfl: 3    pravastatin (PRAVACHOL) 20 MG tablet, Take 1 tablet (20 mg total) by mouth once daily., Disp: 90 tablet, Rfl: 3    rizatriptan (MAXALT) 10 MG tablet, Take 1 tablet (10 mg total) by mouth as needed for Migraine., Disp: 9 tablet, Rfl: 0    topiramate (TOPAMAX) 50 MG tablet, Take 1 tablet (50 mg total) by mouth 2 (two) times daily., Disp: 60 tablet, Rfl: 11    PMHx/PSHx Updates    Objective:     Vitals:  Blood pressure (!) 126/90, height 5' 4" (1.626 m), weight 64 kg (141 lb).    Physical Examination:   GEN: no apparent distress, comfortable; AAOx3  SKIN: no rashes,no ulceration, no Raynaud's, no petechiae, no SQ nodules,  HEAD: normal  EYES: no pallor, no icterus,  NECK: no masses, thyroid normal, trachea midline, no LAD/LN's, supple  CV: RRR with no murmur; l S1 and S2 reg. ,no gallop no rubs,   CHEST: Normal respiratory effort; CTAB; normal breath sounds; no wheeze or crackles  MUSC/Skeletal: ROM normal; no crepitus; joints without synovitis,  no deformities  No joint swelling or tenderness of PIP, MCP, wrist, elbow, shoulder, or knee joints,Paraspinal muscle spasm in cervical lumbar area  EXTREM: no clubbing, cyanosis, no edema,normal  pulses   NEURO: grossly intact; motor WNL; " AAOx3; ,  PSYCH: normal mood, affect and behavior  LYMPH: normal cervical, supraclavicular          Labs:   Lab Results   Component Value Date    WBC 5.11 05/12/2017    HGB 13.9 05/12/2017    HCT 40.8 05/12/2017    MCV 83 05/12/2017     05/12/2017    CMP  @LASTLAB(NA,K,CL,CO2,GLU,BUN,Creatinine,Calcium,PROT,Albumin,Bilitot,Alkphos,AST,ALT,CRP,ESR,RF,CCP,PEDRO,SSA,CPK,uric acid) )@  I have reviewed all available lab results and radiology reports.    Radiology/Diagnostic Studies:        Assessment/Plan:   (1) 38 y.o. female with diagnosis of Fibromyalgia  2) spinal muscle spasms cervical and lumbar area.  PLAN: Physical therapy 3 times a week for 3 weeks.  CBC CMP          Discussion:     I have explained all of the above in detail and the patient understands all of the current recommendation(s). I have answered all questions to the best of my ability and to their complete satisfaction.       The patient is to continue with the current management plan         RTC in  1 month or 4 months if she is doing well.       Electronically signed by Keanu Bravo MD

## 2018-03-02 ENCOUNTER — OFFICE VISIT (OUTPATIENT)
Dept: DERMATOLOGY | Facility: CLINIC | Age: 38
End: 2018-03-02
Payer: COMMERCIAL

## 2018-03-02 VITALS — HEIGHT: 64 IN | WEIGHT: 141 LBS | BODY MASS INDEX: 24.07 KG/M2

## 2018-03-02 DIAGNOSIS — L70.0 ACNE VULGARIS: Primary | ICD-10-CM

## 2018-03-02 PROCEDURE — 99999 PR PBB SHADOW E&M-EST. PATIENT-LVL III: CPT | Mod: PBBFAC,,, | Performed by: DERMATOLOGY

## 2018-03-02 PROCEDURE — 99213 OFFICE O/P EST LOW 20 MIN: CPT | Mod: S$GLB,,, | Performed by: DERMATOLOGY

## 2018-03-02 RX ORDER — DOXYCYCLINE 100 MG/1
TABLET ORAL
Qty: 30 TABLET | Refills: 2 | Status: SHIPPED | OUTPATIENT
Start: 2018-03-02 | End: 2018-07-17

## 2018-03-02 RX ORDER — TRETINOIN 0.25 MG/G
CREAM TOPICAL
Qty: 45 G | Refills: 0 | Status: SHIPPED | OUTPATIENT
Start: 2018-03-02 | End: 2018-07-17

## 2018-03-02 RX ORDER — SPIRONOLACTONE 25 MG/1
TABLET ORAL
Qty: 60 TABLET | Refills: 2 | Status: SHIPPED | OUTPATIENT
Start: 2018-03-02 | End: 2018-07-17

## 2018-03-02 RX ORDER — CLINDAMYCIN PHOSPHATE 10 MG/G
GEL TOPICAL
Qty: 75 ML | Refills: 1 | Status: SHIPPED | OUTPATIENT
Start: 2018-03-02 | End: 2018-03-06

## 2018-03-02 NOTE — PROGRESS NOTES
Subjective:       Patient ID:  Moris Alfaro is a 38 y.o. female who presents for   Chief Complaint   Patient presents with    Acne     Face     Patient last seen 2/9/17 with benign lesions  New complaint today  Lifelong occasional break out on face, acne like, recently worsening  Hysterectomy patient, one ovary  Intense family stressors recently    AM: water, no makeup, oil of olay soap occasionally  PM same  Ambi cream for dark marks    FM, hypothyroidism, glucose intolerance (prev on metformin)          Current Outpatient Prescriptions:     blood sugar diagnostic Strp, 1 strip by Misc.(Non-Drug; Combo Route) route once daily. One Touch Verio, Disp: 100 strip, Rfl: 3    cyclobenzaprine (FLEXERIL) 10 MG tablet, TAKE TO 1 TABLET BY MOUTH TWICE DAILY AS NEEDED, Disp: 60 tablet, Rfl: 3    dextroamphetamine-amphetamine (ADDERALL) 20 mg tablet, Take 1 tablet by mouth 2 (two) times daily. , Disp: , Rfl:     diltiaZEM (CARDIZEM CD) 180 MG 24 hr capsule, Take 180 mg by mouth once daily., Disp: , Rfl:     ergocalciferol (ERGOCALCIFEROL) 50,000 unit Cap, Take one capsule twice weekly, Disp: 24 capsule, Rfl: 3    gabapentin (NEURONTIN) 800 MG tablet, Take 1 tablet (800 mg total) by mouth 3 (three) times daily., Disp: 90 tablet, Rfl: 0    lancets Misc, 1 Device by Misc.(Non-Drug; Combo Route) route once daily. One touch verio, Disp: 90 each, Rfl: 3    levothyroxine (SYNTHROID) 75 MCG tablet, Take 1 tablet (75 mcg total) by mouth once daily., Disp: 90 tablet, Rfl: 3    omeprazole (PRILOSEC) 40 MG capsule, TAKE ONE CAPSULE BY MOUTH EVERY DAY, Disp: 90 capsule, Rfl: 3    pravastatin (PRAVACHOL) 20 MG tablet, Take 1 tablet (20 mg total) by mouth once daily., Disp: 90 tablet, Rfl: 3    propranolol (INDERAL LA) 80 MG 24 hr capsule, Take 1 capsule (80 mg total) by mouth once daily., Disp: 30 capsule, Rfl: 4    rizatriptan (MAXALT) 10 MG tablet, Take 1 tablet (10 mg total) by mouth as needed for Migraine., Disp: 9  tablet, Rfl: 0    topiramate (TOPAMAX) 50 MG tablet, Take 1 tablet (50 mg total) by mouth 2 (two) times daily., Disp: 60 tablet, Rfl: 11    venlafaxine (EFFEXOR-XR) 150 MG Cp24, Take 150 mg by mouth once daily. , Disp: , Rfl:     zolpidem (AMBIEN CR) 12.5 MG CR tablet, TAKE 1 TABLET BY MOUTH EVERY NIGHT AT BEDTIME AS NEEDED, Disp: 30 tablet, Rfl: 0        Acne  - Initial  Affected locations: face  Duration: 1 month  Signs / symptoms: non-healing (getting worse)  Severity: mild to moderate  Timing: constant  Aggravated by: nothing  Treatments tried: OTC Oil of Olay, Ambe cream.  Improvement on treatment: no relief        Review of Systems   Constitutional: Negative for fever, chills and fatigue.   Skin: Negative for daily sunscreen use, activity-related sunscreen use and wears hat.   Hematologic/Lymphatic: Bruises/bleeds easily (bruises easily).        Objective:    Physical Exam   Constitutional: She appears well-developed and well-nourished. No distress.   Neurological: She is alert and oriented to person, place, and time. She is not disoriented.   Psychiatric: She has a normal mood and affect.   Skin:   Areas Examined (abnormalities noted in diagram):   Head / Face Inspection Performed              Diagram Legend     Erythematous scaling macule/papule c/w actinic keratosis       Vascular papule c/w angioma      Pigmented verrucoid papule/plaque c/w seborrheic keratosis      Yellow umbilicated papule c/w sebaceous hyperplasia      Irregularly shaped tan macule c/w lentigo     1-2 mm smooth white papules consistent with Milia      Movable subcutaneous cyst with punctum c/w epidermal inclusion cyst      Subcutaneous movable cyst c/w pilar cyst      Firm pink to brown papule c/w dermatofibroma      Pedunculated fleshy papule(s) c/w skin tag(s)      Evenly pigmented macule c/w junctional nevus     Mildly variegated pigmented, slightly irregular-bordered macule c/w mildly atypical nevus      Flesh colored to evenly  pigmented papule c/w intradermal nevus       Pink pearly papule/plaque c/w basal cell carcinoma      Erythematous hyperkeratotic cursted plaque c/w SCC      Surgical scar with no sign of skin cancer recurrence      Open and closed comedones      Inflammatory papules and pustules      Verrucoid papule consistent consistent with wart     Erythematous eczematous patches and plaques     Dystrophic onycholytic nail with subungual debris c/w onychomycosis     Umbilicated papule    Erythematous-base heme-crusted tan verrucoid plaque consistent with inflamed seborrheic keratosis     Erythematous Silvery Scaling Plaque c/w Psoriasis     See annotation      Assessment / Plan:        Acne vulgaris  -     doxycycline monohydrate 100 mg Tab; Take 1 po qday  Dispense: 30 tablet; Refill: 2  -     tretinoin (RETIN-A) 0.025 % cream; Thin film to AA face QHS  Dispense: 45 g; Refill: 0  -     clindamycin phosphate 1% (CLINDAGEL) 1 % gel; Thin film to AA face QAM  Dispense: 75 mL; Refill: 1  -     spironolactone (ALDACTONE) 25 MG tablet; Take one tab PO daily for 2 weeks, may increase to BID for additional control of acne  Dispense: 60 tablet; Refill: 2    Hysterectomy patient, nl kidney function, no large consumption of K rich foods or drinks    Discussed benefits and risks of therapy including but not limited to breakthrough bleeding, breast tenderness, and elevated potassium levels which may give symptoms of fatigue, palpitations, and nausea. Patient should limit potassium intake - avoid potassium supplements or salt substitutes, limit bananas and citrus fruits. Pregnancy must be avoided while taking spironolactone.    Plan short term antibiotherapy, implement topical regimen           Follow-up in about 3 months (around 6/2/2018).

## 2018-03-06 ENCOUNTER — OFFICE VISIT (OUTPATIENT)
Dept: OBSTETRICS AND GYNECOLOGY | Facility: CLINIC | Age: 38
End: 2018-03-06
Payer: COMMERCIAL

## 2018-03-06 VITALS — BODY MASS INDEX: 24.65 KG/M2 | HEIGHT: 64 IN | WEIGHT: 144.38 LBS

## 2018-03-06 DIAGNOSIS — B37.31 YEAST VAGINITIS: Primary | ICD-10-CM

## 2018-03-06 PROCEDURE — 99213 OFFICE O/P EST LOW 20 MIN: CPT | Mod: S$GLB,,, | Performed by: SPECIALIST

## 2018-03-06 PROCEDURE — 99999 PR PBB SHADOW E&M-EST. PATIENT-LVL II: CPT | Mod: PBBFAC,,, | Performed by: SPECIALIST

## 2018-03-06 RX ORDER — FLUCONAZOLE 200 MG/1
200 TABLET ORAL ONCE
Qty: 1 TABLET | Refills: 1 | Status: SHIPPED | OUTPATIENT
Start: 2018-03-06 | End: 2018-03-06

## 2018-03-06 NOTE — PROGRESS NOTES
37 yo BF presents for evaluation of vaginal discharge with irritation over past several days. Pt states recently began Doxycycline therapy for acne. Denies dysuria, f/c, n/v, external lesions.    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    Thyroid disease     Toe fracture        Past Surgical History:   Procedure Laterality Date    dx lap      ecsi      HYSTERECTOMY  2007    CORINA due to DUB,  ovaries intact    lumbar facet injection      AR EXPLORATORY OF ABDOMEN      RHIZOTOMY      TUBAL LIGATION         Family History   Problem Relation Age of Onset    Thyroid disease Mother     Heart disease Father     Hypertension Father     Pancreatitis Father     Diabetes Father     Stomach cancer Paternal Aunt     Stomach cancer Paternal Uncle     Cancer Paternal Uncle      lung cancer    Melanoma Neg Hx     Psoriasis Neg Hx     Lupus Neg Hx     Eczema Neg Hx        Social History     Social History    Marital status: Legally      Spouse name: N/A    Number of children: N/A    Years of education: N/A     Social History Main Topics    Smoking status: Never Smoker    Smokeless tobacco: Never Used    Alcohol use Yes      Comment: OCCASIONALLY    Drug use: No    Sexual activity: Yes     Partners: Male     Birth control/ protection: None     Other Topics Concern    None     Social History Narrative    None       Current Outpatient Prescriptions   Medication Sig Dispense Refill    cyclobenzaprine (FLEXERIL) 10 MG tablet TAKE TO 1 TABLET BY MOUTH TWICE DAILY AS NEEDED 60 tablet 3    doxycycline monohydrate 100 mg Tab Take 1 po qday 30 tablet 2     ergocalciferol (ERGOCALCIFEROL) 50,000 unit Cap Take one capsule twice weekly 24 capsule 3    gabapentin (NEURONTIN) 800 MG tablet Take 1 tablet (800 mg total) by mouth 3 (three) times daily. 90 tablet 0    lancets Misc 1 Device by Misc.(Non-Drug; Combo Route) route once daily. One touch verio 90 each 3    levothyroxine (SYNTHROID) 75 MCG tablet Take 1 tablet (75 mcg total) by mouth once daily. 90 tablet 3    omeprazole (PRILOSEC) 40 MG capsule TAKE ONE CAPSULE BY MOUTH EVERY DAY 90 capsule 3    pravastatin (PRAVACHOL) 20 MG tablet Take 1 tablet (20 mg total) by mouth once daily. 90 tablet 3    propranolol (INDERAL LA) 80 MG 24 hr capsule Take 1 capsule (80 mg total) by mouth once daily. 30 capsule 4    spironolactone (ALDACTONE) 25 MG tablet Take one tab PO daily for 2 weeks, may increase to BID for additional control of acne 60 tablet 2    topiramate (TOPAMAX) 50 MG tablet Take 1 tablet (50 mg total) by mouth 2 (two) times daily. 60 tablet 11    tretinoin (RETIN-A) 0.025 % cream Thin film to AA face QHS 45 g 0    venlafaxine (EFFEXOR-XR) 150 MG Cp24 Take 150 mg by mouth once daily.       zolpidem (AMBIEN CR) 12.5 MG CR tablet TAKE 1 TABLET BY MOUTH EVERY NIGHT AT BEDTIME AS NEEDED 30 tablet 0    blood sugar diagnostic Strp 1 strip by Misc.(Non-Drug; Combo Route) route once daily. One Touch Verio 100 strip 3    rizatriptan (MAXALT) 10 MG tablet Take 1 tablet (10 mg total) by mouth as needed for Migraine. 9 tablet 0     No current facility-administered medications for this visit.        Review of patient's allergies indicates:   Allergen Reactions    Metronidazole Hives and Itching       Review of System:   General: no chills, fever, night sweats, weight gain or weight loss  Psychological: no depression or suicidal ideation  Breasts: no new or changing breast lumps, nipple discharge or masses.  Respiratory: no cough, shortness of breath, or wheezing  Cardiovascular: no chest pain or dyspnea on  exertion  Gastrointestinal: no abdominal pain, change in bowel habits, or black or bloody stools  Genito-Urinary: no incontinence, urinary frequency/urgency or , pelvic pain or abnormal vaginal bleeding. VAGINAL D/C WITH ITCHING.  Musculoskeletal: no gait disturbance or muscular weakness    General Appearance:  Alert, cooperative, no distress, appears stated age   Head:  Normocephalic, without obvious abnormality, atraumatic   Eyes:  PERRL, conjunctiva/corneas clear, EOM's intact, fundi benign, both eyes   Ears:  Normal TM's and external ear canals, both ears   Nose: Nares normal, septum midline,mucosa normal, no drainage or sinus tenderness   Throat: Lips, mucosa, and tongue normal; teeth and gums normal   Neck: Supple, symmetrical, trachea midline, no adenopathy;  thyroid: not enlarged, symmetric, no tenderness/mass/nodules; no carotid bruit or JVD   Back:   Symmetric, no curvature, ROM normal, no CVA tenderness   Lungs:   Clear to auscultation bilaterally, respirations unlabored       Heart:  Regular rate and rhythm, S1 and S2 normal, no murmur, rub, or gallop   Abdomen:   Soft, non-tender, bowel sounds active all four quadrants,  no masses, no organomegaly    Genitourinary:   External rectal exam shows no thrombosed external hemorrhoids.   Pelvic exam was performed with patient supine.  No labial fusion.  There is no rash, lesion or injury on the right labia.  There is no rash, lesion or injury on the left labia.  No bleeding and no signs of injury around the vaginal introitus, urethra is without lesions and well supported. The cervix is visualized with no discharge, lesions or friability.  Vagina- overt yeast vaginitis with white adherent d/c present  No significant Cystocele, Enterocele or rectocele, and uterus well supported.  Bimanual exam:  The urethra is normal to palpation and there are no palpable vaginal wall masses.  Uterus is not deviated, not enlarged, not fixed, normal shape and not tender.  Cervix  exhibits no motion tenderness.   Right adnexum displays no mass and no tenderness.  Left adnexum displays no mass and no tenderness.   Extremities: Extremities normal, atraumatic, no cyanosis or edema   Pulses: 2+ and symmetric   Skin: Skin color, texture, turgor normal, no rashes or lesions   Lymph nodes: Cervical, supraclavicular, and axillary nodes normal   Neurologic: Normal     Discussed yeast vaginiotis sourav secondary to ABX therapy.  Will treat with Diflucan and follow response

## 2018-03-15 DIAGNOSIS — R53.1 LEFT-SIDED WEAKNESS: Primary | ICD-10-CM

## 2018-03-15 RX ORDER — TOPIRAMATE 50 MG/1
50 TABLET, FILM COATED ORAL 2 TIMES DAILY
Qty: 60 TABLET | Refills: 11 | Status: SHIPPED | OUTPATIENT
Start: 2018-03-15 | End: 2019-05-22 | Stop reason: SDUPTHER

## 2018-03-20 ENCOUNTER — PATIENT MESSAGE (OUTPATIENT)
Dept: DERMATOLOGY | Facility: CLINIC | Age: 38
End: 2018-03-20

## 2018-03-20 ENCOUNTER — PATIENT MESSAGE (OUTPATIENT)
Dept: RHEUMATOLOGY | Facility: CLINIC | Age: 38
End: 2018-03-20

## 2018-04-17 ENCOUNTER — PATIENT MESSAGE (OUTPATIENT)
Dept: OBSTETRICS AND GYNECOLOGY | Facility: CLINIC | Age: 38
End: 2018-04-17

## 2018-04-17 RX ORDER — FLUCONAZOLE 200 MG/1
200 TABLET ORAL ONCE
Qty: 1 TABLET | Refills: 0 | Status: SHIPPED | OUTPATIENT
Start: 2018-04-17 | End: 2018-04-17

## 2018-06-07 ENCOUNTER — OFFICE VISIT (OUTPATIENT)
Dept: OBSTETRICS AND GYNECOLOGY | Facility: CLINIC | Age: 38
End: 2018-06-07
Payer: MEDICAID

## 2018-06-07 ENCOUNTER — HOSPITAL ENCOUNTER (OUTPATIENT)
Dept: RADIOLOGY | Facility: HOSPITAL | Age: 38
Discharge: HOME | End: 2018-06-07
Attending: SPECIALIST
Payer: MEDICAID

## 2018-06-07 VITALS — BODY MASS INDEX: 26.98 KG/M2 | HEIGHT: 64 IN | WEIGHT: 158.06 LBS

## 2018-06-07 VITALS — HEIGHT: 64 IN | BODY MASS INDEX: 26.98 KG/M2 | WEIGHT: 158 LBS

## 2018-06-07 DIAGNOSIS — Z12.39 SCREENING FOR BREAST CANCER: Primary | ICD-10-CM

## 2018-06-07 DIAGNOSIS — Z12.31 VISIT FOR SCREENING MAMMOGRAM: Primary | ICD-10-CM

## 2018-06-07 DIAGNOSIS — Z12.39 SCREENING FOR BREAST CANCER: ICD-10-CM

## 2018-06-07 PROCEDURE — 99999 PR PBB SHADOW E&M-EST. PATIENT-LVL IV: CPT | Mod: PBBFAC,,, | Performed by: SPECIALIST

## 2018-06-07 PROCEDURE — 99213 OFFICE O/P EST LOW 20 MIN: CPT | Mod: S$PBB,,, | Performed by: SPECIALIST

## 2018-06-07 PROCEDURE — 77067 SCR MAMMO BI INCL CAD: CPT | Mod: TC,PN

## 2018-06-07 PROCEDURE — 77063 BREAST TOMOSYNTHESIS BI: CPT | Mod: 26,,, | Performed by: RADIOLOGY

## 2018-06-07 PROCEDURE — 77067 SCR MAMMO BI INCL CAD: CPT | Mod: 26,,, | Performed by: RADIOLOGY

## 2018-06-07 PROCEDURE — 99214 OFFICE O/P EST MOD 30 MIN: CPT | Mod: PBBFAC,PN | Performed by: SPECIALIST

## 2018-06-07 RX ORDER — LORAZEPAM 2 MG/1
2 TABLET ORAL EVERY 6 HOURS PRN
COMMUNITY
End: 2018-07-17

## 2018-06-07 RX ORDER — FLUCONAZOLE 200 MG/1
200 TABLET ORAL ONCE
Qty: 1 TABLET | Refills: 1 | Status: SHIPPED | OUTPATIENT
Start: 2018-06-07 | End: 2019-01-09 | Stop reason: SDUPTHER

## 2018-06-07 NOTE — PROGRESS NOTES
39 yo BF presents for evaluation of vaginal burning and itching as well as intermittant abd/pelivic discomfort since taking Doxycycline for acne.    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    Thyroid disease     Toe fracture        Past Surgical History:   Procedure Laterality Date    dx lap      ecsi      HYSTERECTOMY  2007    CORINA due to DUB,  ovaries intact    lumbar facet injection      [place] EXPLORATORY OF ABDOMEN      RHIZOTOMY      TUBAL LIGATION         Family History   Problem Relation Age of Onset    Thyroid disease Mother     Heart disease Father     Hypertension Father     Pancreatitis Father     Diabetes Father     Stomach cancer Paternal Aunt     Stomach cancer Paternal Uncle     Cancer Paternal Uncle         lung cancer    Melanoma Neg Hx     Psoriasis Neg Hx     Lupus Neg Hx     Eczema Neg Hx        Social History     Social History    Marital status: Legally      Spouse name: N/A    Number of children: N/A    Years of education: N/A     Social History Main Topics    Smoking status: Never Smoker    Smokeless tobacco: Never Used    Alcohol use Yes      Comment: OCCASIONALLY    Drug use: No    Sexual activity: Yes     Partners: Male     Birth control/ protection: None     Other Topics Concern    None     Social History Narrative    None       Current Outpatient Prescriptions   Medication Sig Dispense Refill    cyclobenzaprine (FLEXERIL) 10 MG tablet TAKE TO 1 TABLET BY MOUTH TWICE DAILY AS NEEDED 60 tablet 3    doxycycline monohydrate 100 mg Tab Take 1 po qday 30 tablet 2    gabapentin (NEURONTIN) 800 MG tablet Take 1  tablet (800 mg total) by mouth 3 (three) times daily. 90 tablet 0    lancets Misc 1 Device by Misc.(Non-Drug; Combo Route) route once daily. One touch verio 90 each 3    levothyroxine (SYNTHROID) 75 MCG tablet Take 1 tablet (75 mcg total) by mouth once daily. 90 tablet 3    LORazepam (ATIVAN) 2 MG Tab Take 2 mg by mouth every 6 (six) hours as needed.      pravastatin (PRAVACHOL) 20 MG tablet Take 1 tablet (20 mg total) by mouth once daily. 90 tablet 3    propranolol (INDERAL LA) 80 MG 24 hr capsule Take 1 capsule (80 mg total) by mouth once daily. 30 capsule 4    spironolactone (ALDACTONE) 25 MG tablet Take one tab PO daily for 2 weeks, may increase to BID for additional control of acne 60 tablet 2    topiramate (TOPAMAX) 50 MG tablet Take 1 tablet (50 mg total) by mouth 2 (two) times daily. 60 tablet 11    tretinoin (RETIN-A) 0.025 % cream Thin film to AA face QHS 45 g 0    venlafaxine (EFFEXOR-XR) 150 MG Cp24 Take 150 mg by mouth once daily.       blood sugar diagnostic Strp 1 strip by Misc.(Non-Drug; Combo Route) route once daily. One Touch Verio 100 strip 3    ergocalciferol (ERGOCALCIFEROL) 50,000 unit Cap Take one capsule twice weekly 24 capsule 3    omeprazole (PRILOSEC) 40 MG capsule TAKE ONE CAPSULE BY MOUTH EVERY DAY 90 capsule 3    rizatriptan (MAXALT) 10 MG tablet Take 1 tablet (10 mg total) by mouth as needed for Migraine. 9 tablet 0     No current facility-administered medications for this visit.        Review of patient's allergies indicates:   Allergen Reactions    Metronidazole Hives and Itching       Review of System:   General: no chills, fever, night sweats, weight gain or weight loss  Psychological: no depression or suicidal ideation  Breasts: no new or changing breast lumps, nipple discharge or masses.  Respiratory: no cough, shortness of breath, or wheezing  Cardiovascular: no chest pain or dyspnea on exertion  Gastrointestinal: no abdominal pain, change in bowel habits, or black  or bloody stools  Genito-Urinary: no incontinence, urinary frequency/urgency or vulvar/vaginal symptoms,  or abnormal vaginal bleeding. VAGINAL ITCHING AND BURNING  Musculoskeletal: no gait disturbance or muscular weakness    PE:   APPEARANCE: Well nourished, well developed, in no acute distress.  NECK: Neck symmetric without masses or thyromegaly.  NODES: No inguinal lymph node enlargement.  ABDOMEN: Soft. No tenderness or masses. No hepatosplenomegaly. No hernias.  BREASTS: Symmetrical, no skin changes or visible lesions. No palpable masses, nipple discharge or adenopathy bilaterally.  PELVIC: Normal external female genitalia without lesions. Normal hair distribution. Adequate perineal body, normal urethral meatus. Vagina moist and well rugated with OVERT YEAST WITH WHITE ADHEERNT D/C No significant cystocele or rectocele. Uterus and cervix surgically absent. Bimanual exam revealed no masses, tenderness or abnormality.    I discussed secondary yeast vaginitis    I discussed alexaley secondary to chronic Doxy use. Pt states she is completing tx this month  I will treat yeast and follow

## 2018-06-18 PROBLEM — L70.0 ACNE VULGARIS: Status: ACTIVE | Noted: 2018-06-18

## 2018-06-20 DIAGNOSIS — E78.5 HYPERLIPIDEMIA, UNSPECIFIED HYPERLIPIDEMIA TYPE: ICD-10-CM

## 2018-06-20 DIAGNOSIS — E78.00 HYPERCHOLESTEROLEMIA: ICD-10-CM

## 2018-06-21 RX ORDER — PRAVASTATIN SODIUM 20 MG/1
TABLET ORAL
Qty: 90 TABLET | Refills: 3 | Status: SHIPPED | OUTPATIENT
Start: 2018-06-21 | End: 2019-07-02 | Stop reason: SDUPTHER

## 2018-07-17 ENCOUNTER — OFFICE VISIT (OUTPATIENT)
Dept: ENDOCRINOLOGY | Facility: CLINIC | Age: 38
End: 2018-07-17
Payer: MEDICAID

## 2018-07-17 ENCOUNTER — OFFICE VISIT (OUTPATIENT)
Dept: DERMATOLOGY | Facility: CLINIC | Age: 38
End: 2018-07-17
Payer: MEDICAID

## 2018-07-17 VITALS
HEIGHT: 64 IN | TEMPERATURE: 98 F | WEIGHT: 150.81 LBS | HEART RATE: 79 BPM | DIASTOLIC BLOOD PRESSURE: 69 MMHG | BODY MASS INDEX: 25.75 KG/M2 | SYSTOLIC BLOOD PRESSURE: 107 MMHG

## 2018-07-17 DIAGNOSIS — E11.65 TYPE 2 DIABETES MELLITUS WITH HYPERGLYCEMIA, WITHOUT LONG-TERM CURRENT USE OF INSULIN: ICD-10-CM

## 2018-07-17 DIAGNOSIS — E78.5 HYPERLIPIDEMIA, UNSPECIFIED HYPERLIPIDEMIA TYPE: ICD-10-CM

## 2018-07-17 DIAGNOSIS — L70.0 ACNE VULGARIS: Primary | ICD-10-CM

## 2018-07-17 DIAGNOSIS — E03.9 HYPOTHYROIDISM (ACQUIRED): Primary | ICD-10-CM

## 2018-07-17 DIAGNOSIS — E55.9 HYPOVITAMINOSIS D: ICD-10-CM

## 2018-07-17 PROCEDURE — 99213 OFFICE O/P EST LOW 20 MIN: CPT | Mod: S$PBB,,, | Performed by: PHYSICIAN ASSISTANT

## 2018-07-17 PROCEDURE — 99213 OFFICE O/P EST LOW 20 MIN: CPT | Mod: S$PBB,,, | Performed by: DERMATOLOGY

## 2018-07-17 PROCEDURE — 99213 OFFICE O/P EST LOW 20 MIN: CPT | Mod: PBBFAC,PO | Performed by: PHYSICIAN ASSISTANT

## 2018-07-17 PROCEDURE — 99999 PR PBB SHADOW E&M-EST. PATIENT-LVL III: CPT | Mod: PBBFAC,,, | Performed by: PHYSICIAN ASSISTANT

## 2018-07-17 PROCEDURE — 99212 OFFICE O/P EST SF 10 MIN: CPT | Mod: PBBFAC,27,PO | Performed by: DERMATOLOGY

## 2018-07-17 PROCEDURE — 99999 PR PBB SHADOW E&M-EST. PATIENT-LVL II: CPT | Mod: PBBFAC,,, | Performed by: DERMATOLOGY

## 2018-07-17 RX ORDER — TRETINOIN 0.5 MG/G
CREAM TOPICAL
Qty: 45 G | Refills: 5 | Status: SHIPPED | OUTPATIENT
Start: 2018-07-17 | End: 2018-11-15 | Stop reason: SDUPTHER

## 2018-07-17 RX ORDER — SPIRONOLACTONE 50 MG/1
TABLET, FILM COATED ORAL
Qty: 60 TABLET | Refills: 3 | Status: SHIPPED | OUTPATIENT
Start: 2018-07-17 | End: 2019-11-11 | Stop reason: ALTCHOICE

## 2018-07-17 RX ORDER — BIOTIN 10 MG
1 TABLET ORAL ONCE
COMMUNITY
End: 2018-09-14

## 2018-07-17 RX ORDER — IBUPROFEN 800 MG/1
800 TABLET ORAL 3 TIMES DAILY
COMMUNITY
End: 2018-09-14

## 2018-07-17 RX ORDER — CLINDAMYCIN PHOSPHATE 10 MG/G
GEL TOPICAL 2 TIMES DAILY
COMMUNITY
End: 2018-09-14

## 2018-07-17 NOTE — PROGRESS NOTES
Subjective:       Patient ID:  Moris Alfaro is a 38 y.o. female who presents for   Chief Complaint   Patient presents with    Acne     follow up , 3 month, face     Patient last seen 03/2018 with acne vulgaris  Started spironolactone 50 QD (has been out for 2 weeks)  Uses tretinoin cream QHS (0.025%), states compliance  Post doxy 100 QD x 3 months  Uses BP wash in shower    -     doxycycline monohydrate 100 mg Tab; Take 1 po qday  Dispense: 30 tablet; Refill: 2  -     tretinoin (RETIN-A) 0.025 % cream; Thin film to AA face QHS  Dispense: 45 g; Refill: 0  -     clindamycin phosphate 1% (CLINDAGEL) 1 % gel; Thin film to AA face QAM  Dispense: 75 mL; Refill: 1  -     spironolactone (ALDACTONE) 25 MG tablet; Take one tab PO daily for 2 weeks, may increase to BID for additional control of acne  Dispense: 60 tablet; Refill: 2    Hysterectomy patient, nl kidney function, no large consumption of K rich foods or drinks  Upcoming labs next week      Current Outpatient Prescriptions:     AMOXICILLIN ORAL, Take by mouth. tooth, Disp: , Rfl:     biotin 10 mg Tab, Take 1 tablet by mouth once., Disp: , Rfl:     blood sugar diagnostic Strp, 1 strip by Misc.(Non-Drug; Combo Route) route once daily. One Touch Verio, Disp: 100 strip, Rfl: 3    clindamycin phosphate 1% (CLINDAGEL) 1 % gel, Apply topically 2 (two) times daily., Disp: , Rfl:     cyclobenzaprine (FLEXERIL) 10 MG tablet, TAKE TO 1 TABLET BY MOUTH TWICE DAILY AS NEEDED, Disp: 60 tablet, Rfl: 3    ergocalciferol (ERGOCALCIFEROL) 50,000 unit Cap, Take one capsule twice weekly, Disp: 24 capsule, Rfl: 3    gabapentin (NEURONTIN) 800 MG tablet, Take 1 tablet (800 mg total) by mouth 3 (three) times daily., Disp: 90 tablet, Rfl: 0    ibuprofen (ADVIL,MOTRIN) 800 MG tablet, Take 800 mg by mouth 3 (three) times daily., Disp: , Rfl:     lancets Misc, 1 Device by Misc.(Non-Drug; Combo Route) route once daily. One touch verio, Disp: 90 each, Rfl: 3    levothyroxine  (SYNTHROID) 75 MCG tablet, Take 1 tablet (75 mcg total) by mouth once daily., Disp: 90 tablet, Rfl: 3    omeprazole (PRILOSEC) 40 MG capsule, TAKE ONE CAPSULE BY MOUTH EVERY DAY, Disp: 90 capsule, Rfl: 3    pravastatin (PRAVACHOL) 20 MG tablet, TAKE 1 TABLET BY MOUTH EVERY DAY., Disp: 90 tablet, Rfl: 3    propranolol (INDERAL LA) 80 MG 24 hr capsule, Take 1 capsule (80 mg total) by mouth once daily., Disp: 30 capsule, Rfl: 4    rizatriptan (MAXALT) 10 MG tablet, Take 1 tablet (10 mg total) by mouth as needed for Migraine., Disp: 9 tablet, Rfl: 0    topiramate (TOPAMAX) 50 MG tablet, Take 1 tablet (50 mg total) by mouth 2 (two) times daily., Disp: 60 tablet, Rfl: 11    trazodone HCl (TRAZODONE ORAL), Take by mouth., Disp: , Rfl:     tretinoin (RETIN-A) 0.025 % cream, Thin film to AA face QHS, Disp: 45 g, Rfl: 0    venlafaxine (EFFEXOR-XR) 150 MG Cp24, Take 150 mg by mouth once daily. , Disp: , Rfl:           Acne  - Follow-up  Diagnosis: Acne.  Symptom course: unchanged  Currently using: Clindagel, retin-a cream, cerave otc, neutrogena.  Affected locations: face  Signs / symptoms: non-healing and spreading  Severity: mild        Review of Systems   Constitutional: Negative for fever, chills and fatigue.   Skin: Positive for activity-related sunscreen use and wears hat.   Hematologic/Lymphatic: Does not bruise/bleed easily.        Objective:    Physical Exam   Constitutional: She appears well-developed and well-nourished. No distress.   Neurological: She is alert and oriented to person, place, and time. She is not disoriented.   Psychiatric: She has a normal mood and affect.   Skin:   Areas Examined (abnormalities noted in diagram):   Head / Face Inspection Performed              Diagram Legend     Erythematous scaling macule/papule c/w actinic keratosis       Vascular papule c/w angioma      Pigmented verrucoid papule/plaque c/w seborrheic keratosis      Yellow umbilicated papule c/w sebaceous hyperplasia       Irregularly shaped tan macule c/w lentigo     1-2 mm smooth white papules consistent with Milia      Movable subcutaneous cyst with punctum c/w epidermal inclusion cyst      Subcutaneous movable cyst c/w pilar cyst      Firm pink to brown papule c/w dermatofibroma      Pedunculated fleshy papule(s) c/w skin tag(s)      Evenly pigmented macule c/w junctional nevus     Mildly variegated pigmented, slightly irregular-bordered macule c/w mildly atypical nevus      Flesh colored to evenly pigmented papule c/w intradermal nevus       Pink pearly papule/plaque c/w basal cell carcinoma      Erythematous hyperkeratotic cursted plaque c/w SCC      Surgical scar with no sign of skin cancer recurrence      Open and closed comedones      Inflammatory papules and pustules      Verrucoid papule consistent consistent with wart     Erythematous eczematous patches and plaques     Dystrophic onycholytic nail with subungual debris c/w onychomycosis     Umbilicated papule    Erythematous-base heme-crusted tan verrucoid plaque consistent with inflamed seborrheic keratosis     Erythematous Silvery Scaling Plaque c/w Psoriasis     See annotation      Assessment / Plan:        Acne vulgaris  Continue BP wash  Increase retinoid potency  Increase daily spironolactone  S/p hysterectomy, nl kidney fn, BMP ordered next week by endocrine (CC me)  -     spironolactone (ALDACTONE) 50 MG tablet; 1 tab PO BID  Dispense: 60 tablet; Refill: 3  -     tretinoin (RETIN-A) 0.05 % cream; Thin film to entire face at bedtime  Dispense: 45 g; Refill: 5             Follow-up in about 3 months (around 10/17/2018).

## 2018-07-17 NOTE — PROGRESS NOTES
"Subjective:      Patient ID: Moris Alfaro is a 38 y.o. female.    Chief Complaint:    38 y.o.  lady with hypothyroidism on thyroid hormone repletion and type 2 diabetes.    History of Present IllnessMoris Alfaro is a 38 y.o. female here for account of subclinical hypothyroidism due to Hashimoto's thyroiditis along with other conditions listed in the Visit Diagnosis. +Fhx of DM in father and hypothyroism in her mom. Father has pancreatic cancer.     She as been taking biotin for 3 mths.     PMhx, PShx:   She is taking Lt4 75 mcg QD.    + cold intolerance, fatigue, brittle nails, hair loss, weight gain, palpitations (mvp), anxiety and depression.    Lives w/ IBS.     She has Type 2 DM  is diet controlled and does not check her blood sugar.    HBA1c from  was 4.9% reflecting very good glycemic control.  No BG checks.    Exercise: She was walking until it became too hot.    Diet:  BF-skips  LH-skips  DN- jambalya, baked chicken, she cooks the majority of the time.  Snacks on granola bars, fruit cups. Avoids sugary beverages.    She had a screening thyroid ultrasound (from 07/15) that was negative thyroid nodular disease.    She is s/p CORINA for endometriosis but has one of her ovaries insitu.     Her hormonal testing has not showed her to be perimenopausal. . +hysterectomy. She has one ovary.    Last eye exam: 2017    Review of Systems  ROS:   Constitutional: energy fluctuates, + wt gain  Eyes: +dry eyes, No recent visual changes  Cardiovascular:+ palpitations (seeing cardiology),no CP  Respiratory: + cough, no SOB  Gastrointestinal: + diarrhea, no n/v  GenitoUrinary - No dysuria  Skin: No new skin rash  Musc: muscle aches (fibromyalgia)  Neurologic: + numbness in left hallux  Endocrine: + polydipsia, no polyphagia, polyphagia  Remainder ROS negative     Objective: /69   Pulse 79   Temp 98.3 °F (36.8 °C) (Oral)   Ht 5' 4" (1.626 m)   Wt 68.4 kg (150 lb 12.7 oz)   BMI 25.88 kg/m²    "   Physical Exam   Constitutional: She is oriented to person, place, and time. She appears well-developed and well-nourished. No distress.   Middle aged female. Not pale, anicteric and afebrile. Well hydrated. Not in any apparent acute distress.    HENT:   Head: Normocephalic and atraumatic.   Mouth/Throat: Oropharynx is clear and moist. No oropharyngeal exudate.   Eyes: Conjunctivae and EOM are normal. Pupils are equal, round, and reactive to light. No scleral icterus.   Neck: Normal range of motion. Neck supple. No JVD present. No thyromegaly present.   Cardiovascular: Normal rate, regular rhythm and normal heart sounds.    No murmur heard.  Pulmonary/Chest: Effort normal and breath sounds normal. No respiratory distress. She has no wheezes.   Abdominal: Soft. She exhibits no distension.   Musculoskeletal: Normal range of motion. She exhibits no edema.   Lymphadenopathy:     She has no cervical adenopathy.   Neurological: She is alert and oriented to person, place, and time. She displays normal reflexes. No cranial nerve deficit.   Skin: Skin is warm and dry. No rash noted. She is not diaphoretic. No erythema. No pallor.   Psychiatric: She has a normal mood and affect. Her behavior is normal. Judgment and thought content normal.   Vitals reviewed.    Lab Review:     Personally reviewed labs below:  Lab Results   Component Value Date    HGBA1C 4.9 02/07/2018      Lab Results   Component Value Date    TSH 0.933 02/07/2018    TSH 0.933 02/07/2018    O1XNLDY 75 02/07/2018    FREET4 0.92 02/07/2018    FREET4 0.92 02/07/2018        Chemistry        Component Value Date/Time     02/20/2018 1343    K 4.1 02/20/2018 1343     02/20/2018 1343    CO2 22.2 (L) 02/20/2018 1343    BUN 11 02/20/2018 1343    CREATININE 0.70 02/20/2018 1343    GLU 80 02/20/2018 1343        Component Value Date/Time    CALCIUM 9.1 02/20/2018 1343    ALKPHOS 35 (L) 02/20/2018 1343    AST 22 02/20/2018 1343    ALT 11 05/12/2017 1029     BILITOT 0.5 02/20/2018 1343    ESTGFRAFRICA >60.0 05/12/2017 1029    EGFRNONAA >60.0 05/12/2017 1029         Lab Results   Component Value Date    LFWPSPZK76PS 42 02/07/2018      Lab Results   Component Value Date    CHOL 172 10/23/2017    CHOL 235 (H) 05/12/2017    CHOL 262 (H) 01/05/2017     Lab Results   Component Value Date    HDL 62 10/23/2017    HDL 47 05/12/2017    HDL 51 01/05/2017     Lab Results   Component Value Date    LDLCALC 101.2 10/23/2017    LDLCALC 153.2 05/12/2017    LDLCALC 185.6 (H) 01/05/2017     Lab Results   Component Value Date    TRIG 44 10/23/2017    TRIG 174 (H) 05/12/2017    TRIG 127 01/05/2017     Lab Results   Component Value Date    CHOLHDL 36.0 10/23/2017    CHOLHDL 20.0 05/12/2017    CHOLHDL 19.5 (L) 01/05/2017        The 10-year ASCVD risk score (Raz CUEVAS Jr., et al., 2013) is: 0.8%    Values used to calculate the score:      Age: 38 years      Sex: Female      Is Non- : Yes      Diabetic: Yes      Tobacco smoker: No      Systolic Blood Pressure: 126 mmHg      Is BP treated: No      HDL Cholesterol: 62 mg/dL      Total Cholesterol: 172 mg/dL     Assessment:     1. Hypothyroidism (acquired)  T4, free    T3    Thyroglobulin    TSH   2. Hyperlipidemia, unspecified hyperlipidemia type  Lipid panel   3. Type 2 diabetes mellitus with hyperglycemia, without long-term current use of insulin  Hemoglobin A1c    GlycoMark (TM)    Fructosamine    Basic metabolic panel   4. Hypovitaminosis D  Vitamin D      Hypothyroidism  Chronic-stable-TFTs  Stop biotin before labs for 1 week  Continue levothyroxine 75 mcg    Hyperlipidemia  Chronic-stable-continue statin-LP next week    Type 2 DM  Continue diet  Check BG 3x weekly.  Logs to all visits.    Hypovitaminosis D  Stable-Continue vitamin D supplementation.-check vitamin D    Plan:   F/u in ~ 6 mths

## 2018-07-24 ENCOUNTER — LAB VISIT (OUTPATIENT)
Dept: LAB | Facility: HOSPITAL | Age: 38
End: 2018-07-24
Attending: PHYSICIAN ASSISTANT
Payer: MEDICAID

## 2018-07-24 DIAGNOSIS — E78.5 HYPERLIPIDEMIA, UNSPECIFIED HYPERLIPIDEMIA TYPE: ICD-10-CM

## 2018-07-24 DIAGNOSIS — E55.9 HYPOVITAMINOSIS D: ICD-10-CM

## 2018-07-24 DIAGNOSIS — E11.65 TYPE 2 DIABETES MELLITUS WITH HYPERGLYCEMIA, WITHOUT LONG-TERM CURRENT USE OF INSULIN: ICD-10-CM

## 2018-07-24 DIAGNOSIS — E03.9 HYPOTHYROIDISM (ACQUIRED): ICD-10-CM

## 2018-07-24 LAB
25(OH)D3+25(OH)D2 SERPL-MCNC: 28 NG/ML
ANION GAP SERPL CALC-SCNC: 8 MMOL/L
BUN SERPL-MCNC: 13 MG/DL
CALCIUM SERPL-MCNC: 9.3 MG/DL
CHLORIDE SERPL-SCNC: 109 MMOL/L
CHOLEST SERPL-MCNC: 145 MG/DL
CHOLEST/HDLC SERPL: 3.7 {RATIO}
CO2 SERPL-SCNC: 22 MMOL/L
CREAT SERPL-MCNC: 0.9 MG/DL
EST. GFR  (AFRICAN AMERICAN): >60 ML/MIN/1.73 M^2
EST. GFR  (NON AFRICAN AMERICAN): >60 ML/MIN/1.73 M^2
ESTIMATED AVG GLUCOSE: 97 MG/DL
GLUCOSE SERPL-MCNC: 90 MG/DL
HBA1C MFR BLD HPLC: 5 %
HDLC SERPL-MCNC: 39 MG/DL
HDLC SERPL: 26.9 %
LDLC SERPL CALC-MCNC: 86.2 MG/DL
NONHDLC SERPL-MCNC: 106 MG/DL
POTASSIUM SERPL-SCNC: 4.3 MMOL/L
SODIUM SERPL-SCNC: 139 MMOL/L
T3 SERPL-MCNC: 67 NG/DL
T4 FREE SERPL-MCNC: 1.19 NG/DL
TRIGL SERPL-MCNC: 99 MG/DL
TSH SERPL DL<=0.005 MIU/L-ACNC: 0.66 UIU/ML

## 2018-07-24 PROCEDURE — 80048 BASIC METABOLIC PNL TOTAL CA: CPT

## 2018-07-24 PROCEDURE — 36415 COLL VENOUS BLD VENIPUNCTURE: CPT | Mod: PO

## 2018-07-24 PROCEDURE — 82306 VITAMIN D 25 HYDROXY: CPT

## 2018-07-24 PROCEDURE — 84443 ASSAY THYROID STIM HORMONE: CPT

## 2018-07-24 PROCEDURE — 84480 ASSAY TRIIODOTHYRONINE (T3): CPT

## 2018-07-24 PROCEDURE — 80061 LIPID PANEL: CPT

## 2018-07-24 PROCEDURE — 84378 SUGARS SINGLE QUANT: CPT

## 2018-07-24 PROCEDURE — 83036 HEMOGLOBIN GLYCOSYLATED A1C: CPT

## 2018-07-24 PROCEDURE — 82985 ASSAY OF GLYCATED PROTEIN: CPT

## 2018-07-24 PROCEDURE — 84439 ASSAY OF FREE THYROXINE: CPT

## 2018-07-25 DIAGNOSIS — Z90.710 S/P HYSTERECTOMY: ICD-10-CM

## 2018-07-25 DIAGNOSIS — R53.83 FATIGUE, UNSPECIFIED TYPE: ICD-10-CM

## 2018-07-25 DIAGNOSIS — E03.9 HYPOTHYROIDISM, UNSPECIFIED TYPE: Primary | ICD-10-CM

## 2018-07-25 RX ORDER — LIOTHYRONINE SODIUM 5 UG/1
5 TABLET ORAL DAILY
Qty: 30 TABLET | Refills: 11 | Status: SHIPPED | OUTPATIENT
Start: 2018-07-25 | End: 2019-08-20 | Stop reason: SDUPTHER

## 2018-07-26 ENCOUNTER — TELEPHONE (OUTPATIENT)
Dept: DERMATOLOGY | Facility: CLINIC | Age: 38
End: 2018-07-26

## 2018-07-26 ENCOUNTER — LAB VISIT (OUTPATIENT)
Dept: LAB | Facility: HOSPITAL | Age: 38
End: 2018-07-26
Attending: PHYSICIAN ASSISTANT
Payer: MEDICAID

## 2018-07-26 DIAGNOSIS — R53.83 FATIGUE, UNSPECIFIED TYPE: ICD-10-CM

## 2018-07-26 DIAGNOSIS — Z90.710 S/P HYSTERECTOMY: ICD-10-CM

## 2018-07-26 LAB
BASOPHILS # BLD AUTO: 0.02 K/UL
BASOPHILS NFR BLD: 0.4 %
DIFFERENTIAL METHOD: NORMAL
EOSINOPHIL # BLD AUTO: 0 K/UL
EOSINOPHIL NFR BLD: 0.9 %
ERYTHROCYTE [DISTWIDTH] IN BLOOD BY AUTOMATED COUNT: 12.5 %
FSH SERPL-ACNC: 3.1 MIU/ML
HCT VFR BLD AUTO: 39.8 %
HGB BLD-MCNC: 12.8 G/DL
IMM GRANULOCYTES # BLD AUTO: 0.01 K/UL
IMM GRANULOCYTES NFR BLD AUTO: 0.2 %
IRON SERPL-MCNC: 55 UG/DL
LH SERPL-ACNC: 2.9 MIU/ML
LYMPHOCYTES # BLD AUTO: 1.8 K/UL
LYMPHOCYTES NFR BLD: 39.6 %
MCH RBC QN AUTO: 28.9 PG
MCHC RBC AUTO-ENTMCNC: 32.2 G/DL
MCV RBC AUTO: 90 FL
MONOCYTES # BLD AUTO: 0.5 K/UL
MONOCYTES NFR BLD: 10 %
NEUTROPHILS # BLD AUTO: 2.3 K/UL
NEUTROPHILS NFR BLD: 48.9 %
NRBC BLD-RTO: 0 /100 WBC
PLATELET # BLD AUTO: 242 K/UL
PMV BLD AUTO: 11.4 FL
RBC # BLD AUTO: 4.43 M/UL
SATURATED IRON: 16 %
TOTAL IRON BINDING CAPACITY: 336 UG/DL
TRANSFERRIN SERPL-MCNC: 227 MG/DL
WBC # BLD AUTO: 4.6 K/UL

## 2018-07-26 PROCEDURE — 83001 ASSAY OF GONADOTROPIN (FSH): CPT

## 2018-07-26 PROCEDURE — 83002 ASSAY OF GONADOTROPIN (LH): CPT

## 2018-07-26 PROCEDURE — 85025 COMPLETE CBC W/AUTO DIFF WBC: CPT

## 2018-07-26 PROCEDURE — 36415 COLL VENOUS BLD VENIPUNCTURE: CPT | Mod: PO

## 2018-07-26 PROCEDURE — 83540 ASSAY OF IRON: CPT

## 2018-07-26 NOTE — TELEPHONE ENCOUNTER
PA for tretinoin cream was denied. Was covered in the past because pt had BCBS primary and medicaid secondary. Pt only has medicaid now and is not covered.

## 2018-07-27 ENCOUNTER — PATIENT MESSAGE (OUTPATIENT)
Dept: ENDOCRINOLOGY | Facility: CLINIC | Age: 38
End: 2018-07-27

## 2018-07-27 DIAGNOSIS — D64.9 ANEMIA, UNSPECIFIED TYPE: Primary | ICD-10-CM

## 2018-07-27 LAB — FRUCTOSAMINE SERPL-SCNC: 209 UMOL /L (ref 151–300)

## 2018-07-28 LAB — GLYCOMARK (TM): 18.6 UG/ML

## 2018-07-30 DIAGNOSIS — D64.9 ANEMIA, UNSPECIFIED TYPE: Primary | ICD-10-CM

## 2018-07-30 RX ORDER — FERROUS SULFATE 325(65) MG
325 TABLET ORAL
Qty: 90 TABLET | Refills: 1 | Status: SHIPPED | OUTPATIENT
Start: 2018-07-30 | End: 2019-08-20 | Stop reason: SDUPTHER

## 2018-08-15 DIAGNOSIS — R53.1 LEFT-SIDED WEAKNESS: ICD-10-CM

## 2018-08-15 RX ORDER — PROPRANOLOL HYDROCHLORIDE 80 MG/1
CAPSULE, EXTENDED RELEASE ORAL
Qty: 30 CAPSULE | Refills: 0 | OUTPATIENT
Start: 2018-08-15

## 2018-08-27 RX ORDER — GABAPENTIN 800 MG/1
TABLET ORAL
Qty: 90 TABLET | Refills: 0 | OUTPATIENT
Start: 2018-08-27

## 2018-08-27 RX ORDER — BLOOD-GLUCOSE METER
EACH MISCELLANEOUS
Qty: 100 STRIP | Refills: 0 | Status: SHIPPED | OUTPATIENT
Start: 2018-08-27 | End: 2019-10-29 | Stop reason: SDUPTHER

## 2018-08-31 ENCOUNTER — LAB VISIT (OUTPATIENT)
Dept: LAB | Facility: HOSPITAL | Age: 38
End: 2018-08-31
Attending: SPECIALIST
Payer: MEDICAID

## 2018-08-31 DIAGNOSIS — I34.1 MITRAL VALVE PROLAPSE: ICD-10-CM

## 2018-08-31 DIAGNOSIS — R00.2 PALPITATIONS: Primary | ICD-10-CM

## 2018-08-31 DIAGNOSIS — Z51.81 MEDICATION MONITORING ENCOUNTER: ICD-10-CM

## 2018-08-31 LAB
ANION GAP SERPL CALC-SCNC: 8 MMOL/L
BUN SERPL-MCNC: 11 MG/DL
CALCIUM SERPL-MCNC: 9.6 MG/DL
CHLORIDE SERPL-SCNC: 107 MMOL/L
CO2 SERPL-SCNC: 22 MMOL/L
CREAT SERPL-MCNC: 0.8 MG/DL
EST. GFR  (AFRICAN AMERICAN): >60 ML/MIN/1.73 M^2
EST. GFR  (NON AFRICAN AMERICAN): >60 ML/MIN/1.73 M^2
GLUCOSE SERPL-MCNC: 82 MG/DL
MAGNESIUM SERPL-MCNC: 2.2 MG/DL
POTASSIUM SERPL-SCNC: 4.4 MMOL/L
SODIUM SERPL-SCNC: 137 MMOL/L

## 2018-08-31 PROCEDURE — 36415 COLL VENOUS BLD VENIPUNCTURE: CPT | Mod: PO

## 2018-08-31 PROCEDURE — 80048 BASIC METABOLIC PNL TOTAL CA: CPT

## 2018-08-31 PROCEDURE — 83735 ASSAY OF MAGNESIUM: CPT

## 2018-09-14 ENCOUNTER — LAB VISIT (OUTPATIENT)
Dept: LAB | Facility: HOSPITAL | Age: 38
End: 2018-09-14
Attending: SPECIALIST
Payer: MEDICAID

## 2018-09-14 ENCOUNTER — OFFICE VISIT (OUTPATIENT)
Dept: OBSTETRICS AND GYNECOLOGY | Facility: CLINIC | Age: 38
End: 2018-09-14
Payer: MEDICAID

## 2018-09-14 VITALS — BODY MASS INDEX: 21.71 KG/M2 | WEIGHT: 127.19 LBS | HEIGHT: 64 IN

## 2018-09-14 DIAGNOSIS — B96.89 BV (BACTERIAL VAGINOSIS): ICD-10-CM

## 2018-09-14 DIAGNOSIS — Z11.3 SCREENING EXAMINATION FOR STD (SEXUALLY TRANSMITTED DISEASE): Primary | ICD-10-CM

## 2018-09-14 DIAGNOSIS — N76.0 BV (BACTERIAL VAGINOSIS): ICD-10-CM

## 2018-09-14 DIAGNOSIS — Z11.3 SCREENING EXAMINATION FOR STD (SEXUALLY TRANSMITTED DISEASE): ICD-10-CM

## 2018-09-14 PROCEDURE — 87491 CHLMYD TRACH DNA AMP PROBE: CPT

## 2018-09-14 PROCEDURE — 86592 SYPHILIS TEST NON-TREP QUAL: CPT

## 2018-09-14 PROCEDURE — 36415 COLL VENOUS BLD VENIPUNCTURE: CPT | Mod: PO

## 2018-09-14 PROCEDURE — 86694 HERPES SIMPLEX NES ANTBDY: CPT

## 2018-09-14 PROCEDURE — 99999 PR PBB SHADOW E&M-EST. PATIENT-LVL II: CPT | Mod: PBBFAC,,, | Performed by: SPECIALIST

## 2018-09-14 PROCEDURE — 99213 OFFICE O/P EST LOW 20 MIN: CPT | Mod: S$PBB,,, | Performed by: SPECIALIST

## 2018-09-14 PROCEDURE — 80074 ACUTE HEPATITIS PANEL: CPT

## 2018-09-14 PROCEDURE — 99212 OFFICE O/P EST SF 10 MIN: CPT | Mod: PBBFAC,PN | Performed by: SPECIALIST

## 2018-09-14 PROCEDURE — 86703 HIV-1/HIV-2 1 RESULT ANTBDY: CPT

## 2018-09-14 PROCEDURE — 86696 HERPES SIMPLEX TYPE 2 TEST: CPT

## 2018-09-14 NOTE — PROGRESS NOTES
37 yo BF presents for evaluation malodorous vaginal d/c x 2 weeks following recent sexual activity. Denies dysuria, vaginal bleeding, f/c, PP, n/v.  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    Thyroid disease     Toe fracture        Past Surgical History:   Procedure Laterality Date    COLONOSCOPY N/A 12/11/2013    [facility]    dx lap      ecsi      HYSTERECTOMY  2007    CORINA due to DUB,  ovaries intact    LAPAROSCOPY-DIAGNOSTIC N/A 4/6/2017    Per[facility] by [facility] at [facility] OR    lumbar facet injection      [place] EXPLORATORY OF ABDOMEN      RHIZOTOMY      [id number]- LEFT Left 4/6/2017    [facility]    TUBAL LIGATION         Family History   Problem Relation Age of Onset    Thyroid disease Mother     Heart disease Father     Hypertension Father     Pancreatitis Father     Diabetes Father     Stomach cancer Paternal Aunt     Stomach cancer Paternal Uncle     Cancer Paternal Uncle         lung cancer    Melanoma Neg Hx     Psoriasis Neg Hx     Lupus Neg Hx     Eczema Neg Hx        Social History     Socioeconomic History    Marital status:      Spouse name: None    Number of children: None    Years of education: None    Highest education level: None   Social Needs    Financial resource strain: None    Food insecurity - worry: None    Food insecurity - inability: None    Transportation needs - medical: None    Transportation needs - non-medical: None   Occupational History    None   Tobacco Use    Smoking status: Never Smoker     Smokeless tobacco: Never Used   Substance and Sexual Activity    Alcohol use: Yes     Comment: OCCASIONALLY    Drug use: No    Sexual activity: Yes     Partners: Male     Birth control/protection: None   Other Topics Concern    Are you pregnant or think you may be? Not Asked    Breast-feeding Not Asked   Social History Narrative    None       Current Outpatient Medications   Medication Sig Dispense Refill    cyclobenzaprine (FLEXERIL) 10 MG tablet TAKE TO 1 TABLET BY MOUTH TWICE DAILY AS NEEDED 60 tablet 3    ergocalciferol (ERGOCALCIFEROL) 50,000 unit Cap Take one capsule twice weekly 24 capsule 3    ferrous sulfate 325 mg (65 mg iron) Tab tablet Take 1 tablet (325 mg total) by mouth daily with breakfast. 90 tablet 1    gabapentin (NEURONTIN) 800 MG tablet Take 1 tablet (800 mg total) by mouth 3 (three) times daily. 90 tablet 0    levothyroxine (SYNTHROID) 75 MCG tablet Take 1 tablet (75 mcg total) by mouth once daily. 90 tablet 3    liothyronine (CYTOMEL) 5 MCG Tab Take 1 tablet (5 mcg total) by mouth once daily. 30 tablet 11    omeprazole (PRILOSEC) 40 MG capsule TAKE ONE CAPSULE BY MOUTH EVERY DAY 90 capsule 3    ONETOUCH DELICA LANCETS 33 gauge Misc TEST EVERY  each 0    ONETOUCH VERIO Strp USE AS DIRECTED ONCE A  strip 0    pravastatin (PRAVACHOL) 20 MG tablet TAKE 1 TABLET BY MOUTH EVERY DAY. 90 tablet 3    propranolol (INDERAL LA) 80 MG 24 hr capsule Take 1 capsule (80 mg total) by mouth once daily. 30 capsule 4    spironolactone (ALDACTONE) 50 MG tablet 1 tab PO BID 60 tablet 3    topiramate (TOPAMAX) 50 MG tablet Take 1 tablet (50 mg total) by mouth 2 (two) times daily. 60 tablet 11    trazodone HCl (TRAZODONE ORAL) Take by mouth.      tretinoin (RETIN-A) 0.05 % cream Thin film to entire face at bedtime 45 g 5    venlafaxine (EFFEXOR-XR) 150 MG Cp24 Take 150 mg by mouth once daily.       clindamycin (CLEOCIN) 100 mg vaginal suppository Place 1 suppository (100 mg total)  vaginally every evening. 3 suppository 0    rizatriptan (MAXALT) 10 MG tablet Take 1 tablet (10 mg total) by mouth as needed for Migraine. 9 tablet 0     No current facility-administered medications for this visit.        Review of patient's allergies indicates:   Allergen Reactions    Metronidazole Hives and Itching       Review of System:   General: no chills, fever, night sweats, weight gain or weight loss  Psychological: no depression or suicidal ideation  Breasts: no new or changing breast lumps, nipple discharge or masses.  Respiratory: no cough, shortness of breath, or wheezing  Cardiovascular: no chest pain or dyspnea on exertion  Gastrointestinal: no abdominal pain, change in bowel habits, or black or bloody stools  Genito-Urinary: no incontinence, urinary frequency/urgency or , pelvic pain or abnormal vaginal bleeding. VAGINAL D/C WITH ODOR  Musculoskeletal: no gait disturbance or muscular weakness    PE:   APPEARANCE: Well nourished, well developed, in no acute distress.  NECK: Neck symmetric without masses or thyromegaly.  NODES: No inguinal lymph node enlargement.  ABDOMEN: Soft. No tenderness or masses. No hepatosplenomegaly. No hernias.  BREASTS: Symmetrical, no skin changes or visible lesions. No palpable masses, nipple discharge or adenopathy bilaterally.  PELVIC: Normal external female genitalia without lesions. Normal hair distribution. Adequate perineal body, normal urethral meatus. Vagina moist and well rugated without lesions. POSITIVE GREY D/C WITH POSITIVE WHIFF No significant cystocele or rectocele. Uterus and cervix surgically absent. Bimanual exam revealed no masses, tenderness or abnormality.    I discussed BV and will treat with Cleocin vaginal  Pt desires STD panel and will follow

## 2018-09-15 LAB — RPR SER QL: NORMAL

## 2018-09-17 LAB
C TRACH DNA SPEC QL NAA+PROBE: NOT DETECTED
HAV IGM SERPL QL IA: NEGATIVE
HBV CORE IGM SERPL QL IA: NEGATIVE
HBV SURFACE AG SERPL QL IA: NEGATIVE
HCV AB SERPL QL IA: NEGATIVE
HIV 1+2 AB+HIV1 P24 AG SERPL QL IA: NEGATIVE
HSV AB, IGM BY EIA: NEGATIVE
N GONORRHOEA DNA SPEC QL NAA+PROBE: NOT DETECTED

## 2018-09-18 LAB
HSV1 IGG SERPL QL IA: NEGATIVE
HSV2 IGG SERPL QL IA: NEGATIVE

## 2018-09-20 ENCOUNTER — PATIENT MESSAGE (OUTPATIENT)
Dept: ENDOCRINOLOGY | Facility: CLINIC | Age: 38
End: 2018-09-20

## 2018-11-05 DIAGNOSIS — E55.9 HYPOVITAMINOSIS D: ICD-10-CM

## 2018-11-05 RX ORDER — ERGOCALCIFEROL 1.25 MG/1
CAPSULE ORAL
Qty: 24 CAPSULE | Refills: 3 | Status: SHIPPED | OUTPATIENT
Start: 2018-11-05 | End: 2020-01-27

## 2018-11-06 DIAGNOSIS — R94.6 ABNORMAL THYROID FUNCTION TEST: ICD-10-CM

## 2018-11-06 DIAGNOSIS — E03.4 HYPOTHYROIDISM DUE TO ACQUIRED ATROPHY OF THYROID: ICD-10-CM

## 2018-11-06 RX ORDER — LEVOTHYROXINE SODIUM 75 UG/1
TABLET ORAL
Qty: 90 TABLET | Refills: 3 | Status: SHIPPED | OUTPATIENT
Start: 2018-11-06 | End: 2020-01-13

## 2018-11-15 ENCOUNTER — OFFICE VISIT (OUTPATIENT)
Dept: DERMATOLOGY | Facility: CLINIC | Age: 38
End: 2018-11-15
Payer: MEDICAID

## 2018-11-15 DIAGNOSIS — L70.0 ACNE VULGARIS: Primary | ICD-10-CM

## 2018-11-15 PROCEDURE — 99213 OFFICE O/P EST LOW 20 MIN: CPT | Mod: S$PBB,,, | Performed by: DERMATOLOGY

## 2018-11-15 PROCEDURE — 99213 OFFICE O/P EST LOW 20 MIN: CPT | Mod: PBBFAC,PO | Performed by: DERMATOLOGY

## 2018-11-15 PROCEDURE — 99999 PR PBB SHADOW E&M-EST. PATIENT-LVL III: CPT | Mod: PBBFAC,,, | Performed by: DERMATOLOGY

## 2018-11-15 RX ORDER — TRETINOIN 0.5 MG/G
CREAM TOPICAL
Qty: 45 G | Refills: 5 | Status: SHIPPED | OUTPATIENT
Start: 2018-11-15 | End: 2020-03-27 | Stop reason: SDUPTHER

## 2018-11-15 RX ORDER — CHLORHEXIDINE GLUCONATE ORAL RINSE 1.2 MG/ML
SOLUTION DENTAL
COMMUNITY
Start: 2018-11-05 | End: 2021-02-09

## 2018-11-15 RX ORDER — DEXTROAMPHETAMINE SACCHARATE, AMPHETAMINE ASPARTATE, DEXTROAMPHETAMINE SULFATE AND AMPHETAMINE SULFATE 7.5; 7.5; 7.5; 7.5 MG/1; MG/1; MG/1; MG/1
TABLET ORAL
COMMUNITY
Start: 2018-10-05 | End: 2019-11-11 | Stop reason: ALTCHOICE

## 2018-11-15 NOTE — PROGRESS NOTES
Subjective:       Patient ID:  Moris Alfaro is a 38 y.o. female who presents for   Chief Complaint   Patient presents with    Acne     follow up     Patient last seen 7/2018 with acne vulgaris  Good results with spironolactone 25 mg BID, however, se of palpitations?, cardiologist recommended discontinuation    Uses tretinoin cream QHS (0.05%), states compliance, no longer covered by plan    Post doxy 100 QD x 3 months, with complication of yeast vaginitis  Uses BP wash in shower    Hysterectomy patient, nl kidney function, no large consumption of K rich foods or drinks      C/o dry skin buttock, upper thighs      Current Outpatient Medications:     chlorhexidine (PERIDEX) 0.12 % solution, , Disp: , Rfl:     cyclobenzaprine (FLEXERIL) 10 MG tablet, TAKE TO 1 TABLET BY MOUTH TWICE DAILY AS NEEDED, Disp: 60 tablet, Rfl: 3    dextroamphetamine-amphetamine 30 mg Tab, , Disp: , Rfl:     ergocalciferol (ERGOCALCIFEROL) 50,000 unit Cap, TAKE ONE CAPSULE BY MOUTH TWICE WEEKLY, Disp: 24 capsule, Rfl: 3    gabapentin (NEURONTIN) 800 MG tablet, Take 1 tablet (800 mg total) by mouth 3 (three) times daily., Disp: 90 tablet, Rfl: 0    levothyroxine (SYNTHROID) 75 MCG tablet, TAKE 1 TABLET BY MOUTH ONCE DAILY, Disp: 90 tablet, Rfl: 3    omeprazole (PRILOSEC) 40 MG capsule, TAKE ONE CAPSULE BY MOUTH EVERY DAY, Disp: 90 capsule, Rfl: 3    ONETOUCH DELICA LANCETS 33 gauge Misc, TEST EVERY DAY, Disp: 100 each, Rfl: 0    ONETOUCH VERIO Strp, USE AS DIRECTED ONCE A DAY, Disp: 100 strip, Rfl: 0    propranolol (INDERAL LA) 80 MG 24 hr capsule, Take 1 capsule (80 mg total) by mouth once daily., Disp: 30 capsule, Rfl: 4    spironolactone (ALDACTONE) 50 MG tablet, 1 tab PO BID, Disp: 60 tablet, Rfl: 3    topiramate (TOPAMAX) 50 MG tablet, Take 1 tablet (50 mg total) by mouth 2 (two) times daily., Disp: 60 tablet, Rfl: 11    trazodone HCl (TRAZODONE ORAL), Take by mouth., Disp: , Rfl:     tretinoin (RETIN-A) 0.05 %  cream, Thin film to entire face at bedtime, Disp: 45 g, Rfl: 5    venlafaxine (EFFEXOR-XR) 150 MG Cp24, Take 150 mg by mouth once daily. , Disp: , Rfl:     ferrous sulfate 325 mg (65 mg iron) Tab tablet, Take 1 tablet (325 mg total) by mouth daily with breakfast., Disp: 90 tablet, Rfl: 1    liothyronine (CYTOMEL) 5 MCG Tab, Take 1 tablet (5 mcg total) by mouth once daily., Disp: 30 tablet, Rfl: 11    pravastatin (PRAVACHOL) 20 MG tablet, TAKE 1 TABLET BY MOUTH EVERY DAY., Disp: 90 tablet, Rfl: 3    rizatriptan (MAXALT) 10 MG tablet, Take 1 tablet (10 mg total) by mouth as needed for Migraine., Disp: 9 tablet, Rfl: 0          Acne  - Follow-up  Diagnosis: Acne.  Symptom course: improving  Currently using: aldactone, retin-a.  Affected locations: face  Severity: mild        Review of Systems   Constitutional: Negative for fever, chills and fatigue.   Skin: Negative for daily sunscreen use and activity-related sunscreen use.        Objective:    Physical Exam   Constitutional: She appears well-developed and well-nourished. No distress.   Neurological: She is alert and oriented to person, place, and time. She is not disoriented.   Psychiatric: She has a normal mood and affect.   Skin:   Areas Examined (abnormalities noted in diagram):   Head / Face Inspection Performed  Genitals / Buttocks / Groin Inspection Performed                   Diagram Legend     Erythematous scaling macule/papule c/w actinic keratosis       Vascular papule c/w angioma      Pigmented verrucoid papule/plaque c/w seborrheic keratosis      Yellow umbilicated papule c/w sebaceous hyperplasia      Irregularly shaped tan macule c/w lentigo     1-2 mm smooth white papules consistent with Milia      Movable subcutaneous cyst with punctum c/w epidermal inclusion cyst      Subcutaneous movable cyst c/w pilar cyst      Firm pink to brown papule c/w dermatofibroma      Pedunculated fleshy papule(s) c/w skin tag(s)      Evenly pigmented macule c/w  junctional nevus     Mildly variegated pigmented, slightly irregular-bordered macule c/w mildly atypical nevus      Flesh colored to evenly pigmented papule c/w intradermal nevus       Pink pearly papule/plaque c/w basal cell carcinoma      Erythematous hyperkeratotic cursted plaque c/w SCC      Surgical scar with no sign of skin cancer recurrence      Open and closed comedones      Inflammatory papules and pustules      Verrucoid papule consistent consistent with wart     Erythematous eczematous patches and plaques     Dystrophic onycholytic nail with subungual debris c/w onychomycosis     Umbilicated papule    Erythematous-base heme-crusted tan verrucoid plaque consistent with inflamed seborrheic keratosis     Erythematous Silvery Scaling Plaque c/w Psoriasis     See annotation      Assessment / Plan:        Acne vulgaris  Continue BP wash daily  Patient clear today, but wishes to discontinue spironolactone (on advise of cardiologist)  Decrease to 25mg QD, then discontinue  Continue daily retinoid  Hysterectomy patient  zinc picolinate 22mg QD    -     tretinoin (RETIN-A) 0.05 % cream; Thin film to entire face at bedtime  Dispense: 45 g; Refill: 5  (good rx, printed script)    Xerosis cutis  Good skin care regimen discussed including limiting to one bath or shower/day, using lukewarm water with mild soap and moisturizing cream to skin 1 - 2x/day. Brochure was provided and reviewed with patient.             No Follow-up on file.

## 2018-11-15 NOTE — PATIENT INSTRUCTIONS
XEROSIS (DRY SKIN)    Xerosis is the term for dry skin.  We all have a natural oil coating over our skin produced by the skin oil glands.  If this oil is removed, the skin becomes dry which can lead to cracking, which can lead to inflammation. Xerosis is usually a long-term problem that recurs often, especially in the winter.    1. Cause     Long hot baths or showers can remove our natural oil and lead to xerosis.  One should never take more than one bath or shower a day and for no longer than ten minutes.   Use of harsh soaps such as Zest, Dial, and Ivory can worsen and cause xerosis.   Cold winter weather worsens xerosis because the amount of moisture contained in cold air is much less than the amount of moisture in warm air.    2. Treatment     Treatment is intended to restore the natural oil to your skin.  Keep the skin lubricated.     Do not take more than one bath or shower a day.  Use lukewarm water, not hot.  Hot water dries out the skin.     Use a gentle moisturizing soap such as Cetaphil soap, cerave gentle cleanser, Oil of Olay, Dove sensitive bar, Basis, Ivory moisture care, Restoraderm cleanser.     When toweling dry, dont rub.  Blot the skin so there is still some water left on the skin.  Immediately after toweling, you should apply a moisturizing cream from neck to toes such as Cerave cream, Cetaphil cream, Restoraderm or Eucerin Original Formula cream.  Alpha hydroxyacid lotions, i.e., AmLactin or Cerave SA, also work very well for preventing dry skin, but may burn when used on inflamed or reddened skin.     If you like to swim during the winter months, you should not use soap when getting out of the pool.  When you have finished swimming, rinse off the chlorine with cool to warm water.  If this will be the only shower of the day, then you may use Cetaphil or another mild soap to cleanse your skin.  After the shower, apply a moisturizing cream to all of the skin as above.    3. Additional  recommendation:      Use unscented hypoallergenic detergent for your clothes, avoid softener or dryer sheets unless they are unscented and hypoallergenic (all free and clear; downy free and gentle).    [facility]; [address] [place] [address] Tel: [phone] Fax: [phone]

## 2019-01-10 RX ORDER — FLUCONAZOLE 200 MG/1
TABLET ORAL
Qty: 1 TABLET | Refills: 0 | Status: SHIPPED | OUTPATIENT
Start: 2019-01-10 | End: 2019-11-11 | Stop reason: ALTCHOICE

## 2019-01-17 ENCOUNTER — OFFICE VISIT (OUTPATIENT)
Dept: ENDOCRINOLOGY | Facility: CLINIC | Age: 39
End: 2019-01-17
Payer: MEDICAID

## 2019-01-17 VITALS
SYSTOLIC BLOOD PRESSURE: 87 MMHG | RESPIRATION RATE: 16 BRPM | DIASTOLIC BLOOD PRESSURE: 62 MMHG | HEART RATE: 77 BPM | HEIGHT: 64 IN | BODY MASS INDEX: 20.7 KG/M2 | TEMPERATURE: 98 F | WEIGHT: 121.25 LBS

## 2019-01-17 DIAGNOSIS — E03.9 HYPOTHYROIDISM, UNSPECIFIED TYPE: Primary | ICD-10-CM

## 2019-01-17 DIAGNOSIS — Z90.710 S/P HYSTERECTOMY: ICD-10-CM

## 2019-01-17 DIAGNOSIS — E11.65 TYPE 2 DIABETES MELLITUS WITH HYPERGLYCEMIA, WITHOUT LONG-TERM CURRENT USE OF INSULIN: ICD-10-CM

## 2019-01-17 DIAGNOSIS — I10 HYPERTENSION, UNSPECIFIED TYPE: ICD-10-CM

## 2019-01-17 DIAGNOSIS — E55.9 HYPOVITAMINOSIS D: ICD-10-CM

## 2019-01-17 DIAGNOSIS — E78.5 HYPERLIPIDEMIA, UNSPECIFIED HYPERLIPIDEMIA TYPE: ICD-10-CM

## 2019-01-17 PROCEDURE — 99999 PR PBB SHADOW E&M-EST. PATIENT-LVL III: ICD-10-PCS | Mod: PBBFAC,,, | Performed by: PHYSICIAN ASSISTANT

## 2019-01-17 PROCEDURE — 99213 OFFICE O/P EST LOW 20 MIN: CPT | Mod: PBBFAC,PO | Performed by: PHYSICIAN ASSISTANT

## 2019-01-17 PROCEDURE — 99214 OFFICE O/P EST MOD 30 MIN: CPT | Mod: S$PBB,,, | Performed by: PHYSICIAN ASSISTANT

## 2019-01-17 PROCEDURE — 99214 PR OFFICE/OUTPT VISIT, EST, LEVL IV, 30-39 MIN: ICD-10-PCS | Mod: S$PBB,,, | Performed by: PHYSICIAN ASSISTANT

## 2019-01-17 PROCEDURE — 99999 PR PBB SHADOW E&M-EST. PATIENT-LVL III: CPT | Mod: PBBFAC,,, | Performed by: PHYSICIAN ASSISTANT

## 2019-01-17 RX ORDER — PROPRANOLOL HYDROCHLORIDE 60 MG/1
60 CAPSULE, EXTENDED RELEASE ORAL DAILY
Qty: 30 CAPSULE | Refills: 0 | Status: SHIPPED | OUTPATIENT
Start: 2019-01-17 | End: 2020-01-17

## 2019-01-17 NOTE — PROGRESS NOTES
Subjective:      Patient ID: Moris Alfaro is a 38 y.o. female.    Chief Complaint:  Hypothyroidism on thyroid hormone repletion and type 2 diabetes.    History of Present IllnessMoris Alfaro is a 38 y.o. female here for account of subclinical hypothyroidism due to Hashimoto's thyroiditis along with other conditions listed in the Visit Diagnosis. +Fhx of DM in father and hypothyroism in her mom. Father has pancreatic cancer. Dr. Ash is her cardiologist.    Reports episodes of dizziness. She stopped taking adderall since being out of school. Her blood pressure is low today.     PMhx, PShx:   She is taking Lt4 75 mcg QD. Taking cytomel 5 mg daily.    + cold intolerance, fatigue, brittle nails, hair loss, weight loss, palpitations (mvp), anxiety and depression.    Lives w/ IBS.     She has Type 2 DM  is diet controlled and does not check her blood sugar.    HBA1c from  was 4.9% reflecting very good glycemic control.  No BG checks.    Exercise: No.     Diet:  BF-skips  LH-skips  DN- jambalya, baked chicken, she cooks the majority of the time.  Snacks on chips, fruit cups. Avoids sugary beverages.     She had a screening thyroid ultrasound (from 07/15) that was negative thyroid nodular disease.    She is s/p CORINA for endometriosis but has one of her ovaries insitu.     Her hormonal testing has not showed her to be perimenopausal. . +hysterectomy. She has one ovary.    Last eye exam: 2017    She is taking ergocaciferol twice weekly.     ROS:   Constitutional: energy fluctuates, + wt gain  Eyes: +dry eyes, No recent visual changes  Cardiovascular:+ palpitations (seeing cardiology),no CP  Respiratory: + cough, no SOB  Gastrointestinal: no n/v/d  GenitoUrinary - No dysuria  Skin: No new skin rash  Musc: muscle aches (fibromyalgia)  Neurologic: + occasional dizziness, + numbness in left hallux  Endocrine: + polydipsia, no polyphagia, polyphagia  Remainder ROS negative     Objective: BP (!) 87/62 (BP  "Location: Left arm, Patient Position: Sitting, BP Method: Medium (Automatic))   Pulse 77   Temp 98 °F (36.7 °C) (Oral)   Resp 16   Ht 5' 4" (1.626 m)   Wt 55 kg (121 lb 4.1 oz)   BMI 20.81 kg/m²      Physical Exam   Constitutional: She is oriented to person, place, and time. She appears well-developed and well-nourished. No distress.   Middle aged female. Not pale, anicteric and afebrile. Well hydrated. Not in any apparent acute distress.    HENT:   Head: Normocephalic and atraumatic.   Mouth/Throat: Oropharynx is clear and moist. No oropharyngeal exudate.   Eyes: Conjunctivae and EOM are normal. Pupils are equal, round, and reactive to light. No scleral icterus.   Neck: Normal range of motion. Neck supple. No JVD present. No thyromegaly present.   Cardiovascular: Normal rate, regular rhythm and normal heart sounds.   No murmur heard.  Pulmonary/Chest: Effort normal and breath sounds normal. No respiratory distress. She has no wheezes.   Abdominal: Soft. She exhibits no distension.   Musculoskeletal: Normal range of motion. She exhibits no edema.   Lymphadenopathy:     She has no cervical adenopathy.   Neurological: She is alert and oriented to person, place, and time. She displays normal reflexes. No cranial nerve deficit.   Skin: Skin is warm and dry. No rash noted. She is not diaphoretic. No erythema. No pallor.   Psychiatric: She has a normal mood and affect. Her behavior is normal. Judgment and thought content normal.   Vitals reviewed.    Lab Review:     Personally reviewed labs below:  Lab Results   Component Value Date    HGBA1C 5.0 07/24/2018      Lab Results   Component Value Date    TSH 0.661 07/24/2018    U3LEEIY 67 07/24/2018    FREET4 1.19 07/24/2018        Chemistry        Component Value Date/Time     08/31/2018 1233     02/20/2018 1343    K 4.4 08/31/2018 1233     08/31/2018 1233     02/20/2018 1343    CO2 22 (L) 08/31/2018 1233    BUN 11 08/31/2018 1233    CREATININE " 0.8 08/31/2018 1233    CREATININE 0.70 02/20/2018 1343    GLU 82 08/31/2018 1233    GLU 80 02/20/2018 1343        Component Value Date/Time    CALCIUM 9.6 08/31/2018 1233    ALKPHOS 35 (L) 02/20/2018 1343    AST 22 02/20/2018 1343    ALT 11 05/12/2017 1029    BILITOT 0.5 02/20/2018 1343    ESTGFRAFRICA >60.0 08/31/2018 1233    EGFRNONAA >60.0 08/31/2018 1233         Lab Results   Component Value Date    BVARRLNS40BM 28 (L) 07/24/2018      Lab Results   Component Value Date    CHOL 145 07/24/2018    CHOL 172 10/23/2017    CHOL 235 (H) 05/12/2017     Lab Results   Component Value Date    HDL 39 (L) 07/24/2018    HDL 62 10/23/2017    HDL 47 05/12/2017     Lab Results   Component Value Date    LDLCALC 86.2 07/24/2018    LDLCALC 101.2 10/23/2017    LDLCALC 153.2 05/12/2017     Lab Results   Component Value Date    TRIG 99 07/24/2018    TRIG 44 10/23/2017    TRIG 174 (H) 05/12/2017     Lab Results   Component Value Date    CHOLHDL 26.9 07/24/2018    CHOLHDL 36.0 10/23/2017    CHOLHDL 20.0 05/12/2017        The 10-year ASCVD risk score (Raz CUEVAS Jr., et al., 2013) is: 0.9%    Values used to calculate the score:      Age: 38 years      Sex: Female      Is Non- : Yes      Diabetic: Yes      Tobacco smoker: No      Systolic Blood Pressure: 107 mmHg      Is BP treated: No      HDL Cholesterol: 39 mg/dL      Total Cholesterol: 145 mg/dL     Assessment:     1. Hypothyroidism, unspecified type     2. Type 2 diabetes mellitus with hyperglycemia, without long-term current use of insulin  Hemoglobin A1c    T4, free    T3    TSH    GlycoMark (TM)    Fructosamine    Renal function panel    Microalbumin/creatinine urine ratio    Lipid panel    Comprehensive metabolic panel    GlycoMark (TM)    Hemoglobin A1c    Fructosamine   3. Hyperlipidemia, unspecified hyperlipidemia type     4. Hypovitaminosis D     5. Hypertension, unspecified type  propranolol (INDERAL LA) 60 MG 24 hr capsule   6. S/P hysterectomy   Follicle stimulating hormone    Luteinizing hormone      Hypothyroidism-Chronic-stable-TFTs. Continue levothyroxine 75 mcg daily and cytomel 5 mcg daily.  Hyperlipidemia-Chronic-stable-continue statin-LP next week  Type 2 DM-Continue diet-Check BG 3x weekly. Logs to all visits.  Hypovitaminosis D-Stable-check vd. Continue ergocalciferol  Hypertension-low today-decrease propranolol to 60 mg daily. Blood pressure most likely low due to being off Adderall.   S/p hysterectomy- check fsh/lh    Plan:   F/u in ~ 6 mths

## 2019-02-21 ENCOUNTER — LAB VISIT (OUTPATIENT)
Dept: LAB | Facility: HOSPITAL | Age: 39
End: 2019-02-21
Attending: PHYSICIAN ASSISTANT
Payer: MEDICAID

## 2019-02-21 DIAGNOSIS — E11.65 TYPE 2 DIABETES MELLITUS WITH HYPERGLYCEMIA, WITHOUT LONG-TERM CURRENT USE OF INSULIN: ICD-10-CM

## 2019-02-21 DIAGNOSIS — Z90.710 S/P HYSTERECTOMY: ICD-10-CM

## 2019-02-21 LAB
ALBUMIN SERPL BCP-MCNC: 4 G/DL
ALP SERPL-CCNC: 34 U/L
ALT SERPL W/O P-5'-P-CCNC: 8 U/L
ANION GAP SERPL CALC-SCNC: 6 MMOL/L
AST SERPL-CCNC: 14 U/L
BILIRUB SERPL-MCNC: 0.6 MG/DL
BUN SERPL-MCNC: 9 MG/DL
CALCIUM SERPL-MCNC: 9.5 MG/DL
CHLORIDE SERPL-SCNC: 109 MMOL/L
CHOLEST SERPL-MCNC: 177 MG/DL
CHOLEST/HDLC SERPL: 3.7 {RATIO}
CO2 SERPL-SCNC: 22 MMOL/L
CREAT SERPL-MCNC: 0.7 MG/DL
EST. GFR  (AFRICAN AMERICAN): >60 ML/MIN/1.73 M^2
EST. GFR  (NON AFRICAN AMERICAN): >60 ML/MIN/1.73 M^2
ESTIMATED AVG GLUCOSE: 100 MG/DL
FSH SERPL-ACNC: 7.6 MIU/ML
GLUCOSE SERPL-MCNC: 86 MG/DL
HBA1C MFR BLD HPLC: 5.1 %
HDLC SERPL-MCNC: 48 MG/DL
HDLC SERPL: 27.1 %
LDLC SERPL CALC-MCNC: 114.8 MG/DL
LH SERPL-ACNC: 6.7 MIU/ML
NONHDLC SERPL-MCNC: 129 MG/DL
POTASSIUM SERPL-SCNC: 4.4 MMOL/L
PROT SERPL-MCNC: 7 G/DL
SODIUM SERPL-SCNC: 137 MMOL/L
TRIGL SERPL-MCNC: 71 MG/DL

## 2019-02-21 PROCEDURE — 83001 ASSAY OF GONADOTROPIN (FSH): CPT

## 2019-02-21 PROCEDURE — 82985 ASSAY OF GLYCATED PROTEIN: CPT

## 2019-02-21 PROCEDURE — 80053 COMPREHEN METABOLIC PANEL: CPT

## 2019-02-21 PROCEDURE — 83002 ASSAY OF GONADOTROPIN (LH): CPT

## 2019-02-21 PROCEDURE — 84378 SUGARS SINGLE QUANT: CPT

## 2019-02-21 PROCEDURE — 83036 HEMOGLOBIN GLYCOSYLATED A1C: CPT

## 2019-02-21 PROCEDURE — 80061 LIPID PANEL: CPT

## 2019-02-22 ENCOUNTER — PATIENT MESSAGE (OUTPATIENT)
Dept: ENDOCRINOLOGY | Facility: CLINIC | Age: 39
End: 2019-02-22

## 2019-02-24 LAB — FRUCTOSAMINE SERPL-SCNC: 202 UMOL /L (ref 151–300)

## 2019-02-28 LAB — GLYCOMARK (TM): 17.8 UG/ML

## 2019-03-14 RX ORDER — CLINDAMYCIN PHOSPHATE 100 MG/1
SUPPOSITORY VAGINAL
Qty: 3 SUPPOSITORY | Refills: 0 | Status: SHIPPED | OUTPATIENT
Start: 2019-03-14 | End: 2019-03-15 | Stop reason: SDUPTHER

## 2019-03-15 ENCOUNTER — PATIENT MESSAGE (OUTPATIENT)
Dept: OBSTETRICS AND GYNECOLOGY | Facility: CLINIC | Age: 39
End: 2019-03-15

## 2019-04-02 ENCOUNTER — TELEPHONE (OUTPATIENT)
Dept: RHEUMATOLOGY | Facility: CLINIC | Age: 39
End: 2019-04-02

## 2019-04-02 NOTE — TELEPHONE ENCOUNTER
Called and spoke to patient in regards to rescheduled appointment through [email or web address].  Patient verified she did want to reschedule but wanted to be added to the ait list for sooner then she rescheduled.  Advised patient she was added to wait list.

## 2019-04-13 DIAGNOSIS — L70.0 ACNE VULGARIS: ICD-10-CM

## 2019-04-15 RX ORDER — SPIRONOLACTONE 50 MG/1
TABLET, FILM COATED ORAL
Qty: 60 TABLET | Refills: 0 | OUTPATIENT
Start: 2019-04-15

## 2019-04-23 ENCOUNTER — OFFICE VISIT (OUTPATIENT)
Dept: RHEUMATOLOGY | Facility: CLINIC | Age: 39
End: 2019-04-23
Payer: MEDICAID

## 2019-04-23 VITALS — DIASTOLIC BLOOD PRESSURE: 63 MMHG | SYSTOLIC BLOOD PRESSURE: 95 MMHG | WEIGHT: 112.13 LBS | BODY MASS INDEX: 19.24 KG/M2

## 2019-04-23 DIAGNOSIS — M79.7 FIBROMYALGIA: Primary | ICD-10-CM

## 2019-04-23 LAB
% SATURATION: 16 %
BASOPHILS NFR BLD: 0 K/UL (ref 0–0.2)
BASOPHILS NFR BLD: 0.4 %
EOSINOPHIL NFR BLD: 0 K/UL (ref 0–0.7)
EOSINOPHIL NFR BLD: 0.9 %
ERYTHROCYTE [DISTWIDTH] IN BLOOD BY AUTOMATED COUNT: 13 % (ref 11.7–14.9)
FERRITIN SERPL-MCNC: 175 NG/ML (ref 24–162)
GRAN #: 2.4 K/UL (ref 1.4–6.5)
GRAN%: 51.6 %
HCT VFR BLD AUTO: 38.1 % (ref 36–48)
HGB BLD-MCNC: 11.9 G/DL (ref 12–15)
IMMATURE GRANS (ABS): 0 K/UL (ref 0–1)
IMMATURE GRANULOCYTES: 0.2 %
IRON: 42 MCG/DL (ref 24–162)
LYMPH #: 1.7 K/UL (ref 1.2–3.4)
LYMPH%: 37.9 %
MCH RBC QN AUTO: 28.5 PG (ref 25–35)
MCHC RBC AUTO-ENTMCNC: 31.2 G/DL (ref 31–36)
MCV RBC AUTO: 91.1 FL (ref 79–98)
MONO #: 0.4 K/UL (ref 0.1–0.6)
MONO%: 9 %
NUCLEATED RBCS: 0 %
PLATELET # BLD AUTO: 260 K/UL (ref 140–440)
PMV BLD AUTO: 10 FL (ref 8.8–12.7)
RBC # BLD AUTO: 4.18 M/UL (ref 3.5–5.5)
TOTAL IRON BINDING CAPACITY: 254 MCG/DL (ref 177–435)
WBC # BLD AUTO: 4.6 K/UL (ref 5–10)

## 2019-04-23 PROCEDURE — 99213 PR OFFICE/OUTPT VISIT, EST, LEVL III, 20-29 MIN: ICD-10-PCS | Mod: ,,, | Performed by: INTERNAL MEDICINE

## 2019-04-23 PROCEDURE — 99213 OFFICE O/P EST LOW 20 MIN: CPT | Mod: ,,, | Performed by: INTERNAL MEDICINE

## 2019-04-23 RX ORDER — GABAPENTIN 800 MG/1
800 TABLET ORAL 2 TIMES DAILY
Qty: 60 TABLET | Refills: 2 | Status: SHIPPED | OUTPATIENT
Start: 2019-04-23 | End: 2019-09-27 | Stop reason: SDUPTHER

## 2019-04-23 NOTE — PROGRESS NOTES
[facility] RHEUMATOLOGY            PROGRESS NOTE      Subjective:       Patient ID:   NAME: Moris Alfaro : 1980     39 y.o. female    Referring Doc: No ref. provider found  Other Physicians:    Chief Complaint:  Fibromyalgia (flare up - hurting all over)      History of Present Illness:     Patient returns today for a regularly scheduled follow-up visit for   FM .    The patient was last seen 1 yr ago.Since her last visit she has lost 30 pounds.  No CP,cough or SOB  Has environmental allergies.  Did well regarding Fibromyalgia sxs until last few months ,has increased myalgias and fatigue.          ROS:   GEN:  No  fever, night sweats . Weight up and down a few pounds.Lost 30# within 1 yr   + fatigue  SKIN: no rashes, no bruising, no ulcerations, no Raynaud's  HEENT: no HA's, No visual changes, no mucosal ulcers, no sicca symptoms,  CV:   no CP, SOB, PND, ZU[place]IGA, no orthopnea, no palpitations  PULM: normal with no SOB, cough, hemoptysis, sputum or pleuritic pain  GI:  no abdominal pain, nausea, vomiting, constipation, diarrhea, melanotic stools, BRBPR, hematemesis, no dysphagia  :   no dysuria  NEURO: no paresthesias, headaches, visual disturbances, muscle weakness  MUSCULOSKELETAL:no joint swelling, prolonged AM stiffness, no back pain, + muscle pain  Allergies:  Review of patient's allergies indicates:   Allergen Reactions    Metronidazole Hives and Itching       Medications:    Current Outpatient Medications:     cyclobenzaprine (FLEXERIL) 10 MG tablet, TAKE TO 1 TABLET BY MOUTH TWICE DAILY AS NEEDED, Disp: 60 tablet, Rfl: 3    ergocalciferol (ERGOCALCIFEROL) 50,000 unit Cap, TAKE ONE CAPSULE BY MOUTH TWICE WEEKLY, Disp: 24 capsule, Rfl: 3    ferrous sulfate 325 mg (65 mg iron) Tab tablet, Take 1 tablet (325 mg total) by mouth daily with breakfast., Disp: 90 tablet, Rfl: 1    levothyroxine (SYNTHROID) 75 MCG tablet, TAKE 1 TABLET BY MOUTH ONCE DAILY, Disp: 90 tablet, Rfl: 3    liothyronine  (CYTOMEL) 5 MCG Tab, Take 1 tablet (5 mcg total) by mouth once daily., Disp: 30 tablet, Rfl: 11    ONETOUCH DELICA LANCETS 33 gauge Misc, TEST EVERY DAY AS DIRECTED, Disp: 100 each, Rfl: 3    ONETOUCH VERIO Strp, USE AS DIRECTED ONCE A DAY, Disp: 100 strip, Rfl: 0    pravastatin (PRAVACHOL) 20 MG tablet, TAKE 1 TABLET BY MOUTH EVERY DAY., Disp: 90 tablet, Rfl: 3    propranolol (INDERAL LA) 60 MG 24 hr capsule, Take 1 capsule (60 mg total) by mouth once daily., Disp: 30 capsule, Rfl: 0    spironolactone (ALDACTONE) 50 MG tablet, 1 tab PO BID, Disp: 60 tablet, Rfl: 3    topiramate (TOPAMAX) 50 MG tablet, Take 1 tablet (50 mg total) by mouth 2 (two) times daily., Disp: 60 tablet, Rfl: 11    trazodone HCl (TRAZODONE ORAL), Take by mouth., Disp: , Rfl:     venlafaxine (EFFEXOR-XR) 150 MG Cp24, Take 150 mg by mouth once daily. , Disp: , Rfl:     chlorhexidine (PERIDEX) 0.12 % solution, , Disp: , Rfl:     clindamycin (CLEOCIN) 100 mg vaginal suppository, INSERT ONE SUPPOSITORY VAGINALLY EVERY EVENING, Disp: 3 suppository, Rfl: 0    dextroamphetamine-amphetamine 30 mg Tab, , Disp: , Rfl:     fluconazole (DIFLUCAN) 200 MG Tab, TAKE 1 TABLET BY MOUTH ONCE, Disp: 1 tablet, Rfl: 0    gabapentin (NEURONTIN) 800 MG tablet, Take 1 tablet (800 mg total) by mouth 3 (three) times daily., Disp: 90 tablet, Rfl: 0    omeprazole (PRILOSEC) 40 MG capsule, TAKE ONE CAPSULE BY MOUTH EVERY DAY, Disp: 90 capsule, Rfl: 3    tretinoin (RETIN-A) 0.05 % cream, Thin film to entire face at bedtime, Disp: 45 g, Rfl: 5    PMHx/PSHx Updates:        Objective:     Vitals:  Blood pressure 95/63, weight 50.8 kg (112 lb 1.6 oz).    Physical Examination:   GEN: no apparent distress, comfortable; AAOx3  SKIN: no rashes,no ulceration, no Raynaud's, no petechiae, no SQ nodules,  HEAD: normal  EYES: no pallor, no icterus,  NECK: no masses, thyroid normal, trachea midline, no LAD/LN's, supple  CV: RRR with no murmur; l S1 and S2 reg. ,no gallop  no rubs,   CHEST: Normal respiratory effort; CTAB; normal breath sounds; no wheeze or crackles  ABDOM: nontender and nondistended; soft; no masses; no rebound/guarding  MUSC/Skeletal: ROM normal; no crepitus; joints without synovitis,  no deformities  No joint swelling or tenderness of PIP, MCP, wrist, elbow, shoulder, or knee joints  EXTREM: no clubbing, cyanosis, no edema,normal  pulses Widespread trigger points  NEURO: grossly intact; motor WNL; AAOx3; ,  PSYCH: normal mood, affect and behavior  LYMPH: normal cervical, supraclavicular          Labs:   Lab Results   Component Value Date    WBC 4.60 07/26/2018    HGB 12.8 07/26/2018    HCT 39.8 07/26/2018    MCV 90 07/26/2018     07/26/2018    CMP  @LASTLAB(NA,K,CL,CO2,GLU,BUN,Creatinine,Calcium,PROT,Albumin,Bilitot,Alkphos,AST,ALT,CRP,ESR,RF,CCP,PEDRO,SSA,CPK,uric acid) )@  I have reviewed all available lab results and radiology reports.    Radiology/Diagnostic Studies:        Assessment/Plan:   (1) 39 y.o. female with diagnosis of FM ,OA  2)was told she had anemia about a year ago; has been on iron supplements since.        PLAN: CBC and iron  studies  Restart Neurontin at 800 mg twice a day    Discussion:     I have explained all of the above in detail and the patient understands all of the current recommendation(s). I have answered all questions to the best of my ability and to their complete satisfaction.       The patient is to continue with the current management plan         RTC in   6 wks      Electronically signed by Keanu Bravo MD

## 2019-05-22 DIAGNOSIS — R53.1 LEFT-SIDED WEAKNESS: ICD-10-CM

## 2019-05-22 RX ORDER — TOPIRAMATE 50 MG/1
50 TABLET, FILM COATED ORAL 2 TIMES DAILY
Qty: 60 TABLET | Refills: 11 | Status: SHIPPED | OUTPATIENT
Start: 2019-05-22 | End: 2020-06-08

## 2019-06-04 ENCOUNTER — OFFICE VISIT (OUTPATIENT)
Dept: RHEUMATOLOGY | Facility: CLINIC | Age: 39
End: 2019-06-04
Payer: COMMERCIAL

## 2019-06-04 VITALS — SYSTOLIC BLOOD PRESSURE: 105 MMHG | WEIGHT: 117.13 LBS | BODY MASS INDEX: 20.1 KG/M2 | DIASTOLIC BLOOD PRESSURE: 74 MMHG

## 2019-06-04 DIAGNOSIS — M79.7 FIBROMYALGIA: Primary | ICD-10-CM

## 2019-06-04 PROCEDURE — 99213 OFFICE O/P EST LOW 20 MIN: CPT | Mod: ,,, | Performed by: INTERNAL MEDICINE

## 2019-06-04 PROCEDURE — 99213 PR OFFICE/OUTPT VISIT, EST, LEVL III, 20-29 MIN: ICD-10-PCS | Mod: ,,, | Performed by: INTERNAL MEDICINE

## 2019-06-04 NOTE — PROGRESS NOTES
[facility] RHEUMATOLOGY            PROGRESS NOTE      Subjective:       Patient ID:   NAME: Moris Alfaro : 1980     39 y.o. female    Referring Doc: No ref. provider found  Other Physicians:    Chief Complaint:  Fibromyalgia      History of Present Illness:     Patient returns today for a regularly scheduled follow-up visit for  FM     The patient continues with fatigue.  Paresthesias l hand mainly at night  No fevers,CP,cough or SOB  No GI compalints            ROS:   GEN:  No  fever, night sweats . weight is stable   + fatigue  SKIN: no rashes, no bruising, no ulcerations, no Raynaud's  HEENT: no HA's, No visual changes, no mucosal ulcers, no sicca symptoms,  CV:   no CP, SOB, PND, ZUÑIGA, no orthopnea, no palpitations  PULM: normal with no SOB, cough, hemoptysis, sputum or pleuritic pain  GI:  no abdominal pain, nausea, vomiting, constipation, diarrhea, melanotic stools, BRBPR, hematemesis, no dysphagia  :   no dysuria  NEURO: no paresthesias, headaches, visual disturbances, muscle weakness  MUSCULOSKELETAL:no joint swelling, prolonged AM stiffness, no back pain, + muscle pain  Allergies:  Review of patient's allergies indicates:   Allergen Reactions    Metronidazole Hives and Itching       Medications:    Current Outpatient Medications:     chlorhexidine (PERIDEX) 0.12 % solution, , Disp: , Rfl:     clindamycin (CLEOCIN) 100 mg vaginal suppository, INSERT ONE SUPPOSITORY VAGINALLY EVERY EVENING, Disp: 3 suppository, Rfl: 0    cyclobenzaprine (FLEXERIL) 10 MG tablet, TAKE TO 1 TABLET BY MOUTH TWICE DAILY AS NEEDED, Disp: 60 tablet, Rfl: 3    dextroamphetamine-amphetamine 30 mg Tab, , Disp: , Rfl:     ergocalciferol (ERGOCALCIFEROL) 50,000 unit Cap, TAKE ONE CAPSULE BY MOUTH TWICE WEEKLY, Disp: 24 capsule, Rfl: 3    ferrous sulfate 325 mg (65 mg iron) Tab tablet, Take 1 tablet (325 mg total) by mouth daily with breakfast., Disp: 90 tablet, Rfl: 1    fluconazole (DIFLUCAN) 200 MG Tab, TAKE 1  TABLET BY MOUTH ONCE, Disp: 1 tablet, Rfl: 0    gabapentin (NEURONTIN) 800 MG tablet, Take 1 tablet (800 mg total) by mouth 2 (two) times daily., Disp: 60 tablet, Rfl: 2    levothyroxine (SYNTHROID) 75 MCG tablet, TAKE 1 TABLET BY MOUTH ONCE DAILY, Disp: 90 tablet, Rfl: 3    liothyronine (CYTOMEL) 5 MCG Tab, Take 1 tablet (5 mcg total) by mouth once daily., Disp: 30 tablet, Rfl: 11    omeprazole (PRILOSEC) 40 MG capsule, TAKE ONE CAPSULE BY MOUTH EVERY DAY, Disp: 90 capsule, Rfl: 3    ONETOUCH DELICA LANCETS 33 gauge Misc, TEST EVERY DAY AS DIRECTED, Disp: 100 each, Rfl: 3    ONETOUCH VERIO Strp, USE AS DIRECTED ONCE A DAY, Disp: 100 strip, Rfl: 0    pravastatin (PRAVACHOL) 20 MG tablet, TAKE 1 TABLET BY MOUTH EVERY DAY., Disp: 90 tablet, Rfl: 3    propranolol (INDERAL LA) 60 MG 24 hr capsule, Take 1 capsule (60 mg total) by mouth once daily., Disp: 30 capsule, Rfl: 0    spironolactone (ALDACTONE) 50 MG tablet, 1 tab PO BID, Disp: 60 tablet, Rfl: 3    topiramate (TOPAMAX) 50 MG tablet, Take 1 tablet (50 mg total) by mouth 2 (two) times daily., Disp: 60 tablet, Rfl: 11    trazodone HCl (TRAZODONE ORAL), Take by mouth., Disp: , Rfl:     tretinoin (RETIN-A) 0.05 % cream, Thin film to entire face at bedtime, Disp: 45 g, Rfl: 5    venlafaxine (EFFEXOR-XR) 150 MG Cp24, Take 150 mg by mouth once daily. , Disp: , Rfl:     PMHx/PSHx Updates:          Objective:     Vitals:  Blood pressure 105/74, weight 53.1 kg (117 lb 1.6 oz).    Physical Examination:   GEN: no apparent distress, comfortable; AAOx3  SKIN: no rashes,no ulceration, no Raynaud's, no petechiae, no SQ nodules,  HEAD: normal  EYES: no pallor, no icterus,  NECK: no masses, thyroid normal, trachea midline, no LAD/LN's, supple  CV: RRR with no murmur; l S1 and S2 reg. ,no gallop no rubs,   CHEST: Normal respiratory effort; CTAB; normal breath sounds; no wheeze or crackles  MUSC/Skeletal: ROM normal; no crepitus; joints without synovitis,  no deformities   No joint swelling or tenderness of PIP, MCP, wrist, elbow, shoulder, or knee joints.widespread trigger points  EXTREM: no clubbing, cyanosis, no edema,normal  pulses   NEURO: grossly intact; motor WNL; AAOx3; ,   PSYCH: normal mood, affect and behavior  LYMPH: normal cervical, supraclavicular          Labs:   Lab Results   Component Value Date    WBC 4.6 (L) 04/23/2019    HGB 11.9 (L) 04/23/2019    HCT 38.1 04/23/2019    MCV 91.1 04/23/2019     04/23/2019    CMP  @LASTLAB(NA,K,CL,CO2,GLU,BUN,Creatinine,Calcium,PROT,Albumin,Bilitot,Alkphos,AST,ALT,CRP,ESR,RF,CCP,PEDRO,SSA,CPK,uric acid) )@  I have reviewed all available lab results and radiology reports.    Radiology/Diagnostic Studies:        Assessment/Plan:   (1) 39 y.o. female with diagnosis of fibromyalgia. She is stable  History of depression and conversion disorder.      Continue present medications        Discussion:     I have explained all of the above in detail and the patient understands all of the current recommendation(s). I have answered all questions to the best of my ability and to their complete satisfaction.       The patient is to continue with the current management plan         RTC in   A few months      Electronically signed by Keanu Bravo MD

## 2019-06-25 ENCOUNTER — LAB VISIT (OUTPATIENT)
Dept: LAB | Facility: HOSPITAL | Age: 39
End: 2019-06-25
Attending: INTERNAL MEDICINE
Payer: COMMERCIAL

## 2019-06-25 ENCOUNTER — OFFICE VISIT (OUTPATIENT)
Dept: ENDOCRINOLOGY | Facility: CLINIC | Age: 39
End: 2019-06-25
Payer: MEDICAID

## 2019-06-25 VITALS
SYSTOLIC BLOOD PRESSURE: 100 MMHG | DIASTOLIC BLOOD PRESSURE: 80 MMHG | RESPIRATION RATE: 16 BRPM | WEIGHT: 120.69 LBS | BODY MASS INDEX: 20.6 KG/M2 | HEART RATE: 89 BPM | HEIGHT: 64 IN | TEMPERATURE: 98 F

## 2019-06-25 DIAGNOSIS — E55.9 HYPOVITAMINOSIS D: ICD-10-CM

## 2019-06-25 DIAGNOSIS — E04.9 GOITER: ICD-10-CM

## 2019-06-25 DIAGNOSIS — I10 ESSENTIAL HYPERTENSION: ICD-10-CM

## 2019-06-25 DIAGNOSIS — Z90.710 S/P HYSTERECTOMY: ICD-10-CM

## 2019-06-25 DIAGNOSIS — E78.5 HYPERLIPIDEMIA, UNSPECIFIED HYPERLIPIDEMIA TYPE: ICD-10-CM

## 2019-06-25 DIAGNOSIS — F90.0 ATTENTION DEFICIT HYPERACTIVITY DISORDER (ADHD), PREDOMINANTLY INATTENTIVE TYPE: ICD-10-CM

## 2019-06-25 DIAGNOSIS — E11.65 TYPE 2 DIABETES MELLITUS WITH HYPERGLYCEMIA, WITHOUT LONG-TERM CURRENT USE OF INSULIN: ICD-10-CM

## 2019-06-25 DIAGNOSIS — E78.00 HYPERCHOLESTEROLEMIA: ICD-10-CM

## 2019-06-25 DIAGNOSIS — F44.5 PSEUDOSEIZURES: ICD-10-CM

## 2019-06-25 DIAGNOSIS — G43.009 MIGRAINE WITHOUT AURA AND WITHOUT STATUS MIGRAINOSUS, NOT INTRACTABLE: ICD-10-CM

## 2019-06-25 DIAGNOSIS — E11.65 TYPE 2 DIABETES MELLITUS WITH HYPERGLYCEMIA, WITHOUT LONG-TERM CURRENT USE OF INSULIN: Primary | ICD-10-CM

## 2019-06-25 DIAGNOSIS — E03.9 HYPOTHYROIDISM (ACQUIRED): ICD-10-CM

## 2019-06-25 DIAGNOSIS — F32.89 OTHER DEPRESSION: ICD-10-CM

## 2019-06-25 DIAGNOSIS — E06.9 THYROIDITIS: ICD-10-CM

## 2019-06-25 LAB
CA-I BLDV-SCNC: 1.26 MMOL/L (ref 1.06–1.42)
ESTIMATED AVG GLUCOSE: 91 MG/DL (ref 68–131)
HBA1C MFR BLD HPLC: 4.8 % (ref 4–5.6)
T3 SERPL-MCNC: 74 NG/DL (ref 60–180)
T4 FREE SERPL-MCNC: 1.04 NG/DL (ref 0.71–1.51)
TSH SERPL DL<=0.005 MIU/L-ACNC: 0.58 UIU/ML (ref 0.4–4)
URATE SERPL-MCNC: 3.9 MG/DL (ref 2.4–5.7)

## 2019-06-25 PROCEDURE — 84550 ASSAY OF BLOOD/URIC ACID: CPT

## 2019-06-25 PROCEDURE — 84439 ASSAY OF FREE THYROXINE: CPT

## 2019-06-25 PROCEDURE — 99214 OFFICE O/P EST MOD 30 MIN: CPT | Mod: S$PBB,,, | Performed by: INTERNAL MEDICINE

## 2019-06-25 PROCEDURE — 99999 PR PBB SHADOW E&M-EST. PATIENT-LVL V: CPT | Mod: PBBFAC,,, | Performed by: INTERNAL MEDICINE

## 2019-06-25 PROCEDURE — 84480 ASSAY TRIIODOTHYRONINE (T3): CPT

## 2019-06-25 PROCEDURE — 99215 OFFICE O/P EST HI 40 MIN: CPT | Mod: PBBFAC,PO | Performed by: INTERNAL MEDICINE

## 2019-06-25 PROCEDURE — 36415 COLL VENOUS BLD VENIPUNCTURE: CPT | Mod: PO

## 2019-06-25 PROCEDURE — 83970 ASSAY OF PARATHORMONE: CPT

## 2019-06-25 PROCEDURE — 82985 ASSAY OF GLYCATED PROTEIN: CPT

## 2019-06-25 PROCEDURE — 99214 PR OFFICE/OUTPT VISIT, EST, LEVL IV, 30-39 MIN: ICD-10-PCS | Mod: S$PBB,,, | Performed by: INTERNAL MEDICINE

## 2019-06-25 PROCEDURE — 84443 ASSAY THYROID STIM HORMONE: CPT

## 2019-06-25 PROCEDURE — 86800 THYROGLOBULIN ANTIBODY: CPT

## 2019-06-25 PROCEDURE — 84378 SUGARS SINGLE QUANT: CPT

## 2019-06-25 PROCEDURE — 99999 PR PBB SHADOW E&M-EST. PATIENT-LVL V: ICD-10-PCS | Mod: PBBFAC,,, | Performed by: INTERNAL MEDICINE

## 2019-06-25 PROCEDURE — 82330 ASSAY OF CALCIUM: CPT

## 2019-06-25 PROCEDURE — 82306 VITAMIN D 25 HYDROXY: CPT

## 2019-06-25 PROCEDURE — 83036 HEMOGLOBIN GLYCOSYLATED A1C: CPT | Mod: 59

## 2019-06-25 NOTE — PROGRESS NOTES
Subjective:      Patient ID: Moris Alfaro is a 39 y.o. female.    Chief Complaint:  Hypothyroidism    39 yr old lady with hypothyroidism on thyroid hormone repletion and type 2 diabetes    History of Present Illness    Patient is a 39 yr old lady seen in [facility] on account of subclinical hypothyroidism due to Hashimoto's thyroiditis. She also has a background history of chronic migraines, obesity, early onset type 2 diabetes,  anxiety neurosis, conversion disorder and pseudoseizures in the past in addition to GERD.  Patient also has a poorly defined background past history of fibromyalgia and  had been commenced on thyroid hormone repletion with Lt4 @75mcg QD and cytomel 5mcg Qd.  She is presently of the  metformin 500mg once DAILY for type 2 diabetes. At the present time she is entirely diet controlled. She is presently not doing SMBGs at all.  Her most recent HBA1c from 04/19  was 5.1 reflecting very good glycemic control.  Patient complains of significant fatigue over the last few months. She has no problems currently with hot flashes but has been having some problems with cold sensitivity.  Patient does have intermittent palpitations.  She has not been checking her SMBGs frequently.  In addition she had a screening thyroid ultrasound (from 07/15) which was essentially -ve for any significant thyroid nodular disease.  Patient feels well. She is sleeping a little better now but continues to have episodic insomnia.  She is s/p CORINA for endometriosis but has one of her ovaries insitu. She has been having intermittent hot flashes and drenching sweats and also has some mouth dryness for which she takes regular free fluid. Her hormonal testing however has not showed her to be perimenopausal.  Patient is now s/p unilateral oophorectomy and unilateral fallopian tube removal.   Patient had her opthalmic screening done this year and this was normal with no evidence of retinopathy.  Patient does not receive flu  "vaccinations by choice. Patient has also never pneumovax again by choice.    Review of Systems   Constitutional: Negative for diaphoresis, fatigue and unexpected weight change.   HENT: Negative for facial swelling, trouble swallowing and voice change.    Eyes: Negative for visual disturbance.   Respiratory: Negative for cough and shortness of breath.    Cardiovascular: Positive for palpitations (intermittent). Negative for chest pain and leg swelling.   Gastrointestinal: Negative for abdominal pain, diarrhea, nausea and vomiting.   Endocrine: Positive for cold intolerance. Negative for polyuria.   Genitourinary: Negative for flank pain, frequency and menstrual problem (S/P CORINA and unilateral oophorectomy).   Musculoskeletal: Negative for arthralgias, back pain and myalgias.   Skin: Negative for color change, pallor and rash.   Neurological: Negative for dizziness, numbness and headaches.   Hematological: Does not bruise/bleed easily.   Psychiatric/Behavioral: Negative for confusion. The patient is not nervous/anxious.        Objective: /80 (BP Location: Left arm, Patient Position: Sitting, BP Method: Medium (Manual))   Pulse 89   Temp 98.2 °F (36.8 °C) (Oral)   Resp 16   Ht 5' 4" (1.626 m)   Wt 54.8 kg (120 lb 11.2 oz)   BMI 20.72 kg/m²  Body surface area is 1.57 meters squared.         Physical Exam   Constitutional: She is oriented to person, place, and time. She appears well-developed and well-nourished. No distress.   Middle aged female. Not pale, anicteric and afebrile. Well hydrated. Not in any apparent acute distress.    HENT:   Head: Normocephalic and atraumatic.   Mouth/Throat: Oropharynx is clear and moist. No oropharyngeal exudate.   Eyes: Pupils are equal, round, and reactive to light. Conjunctivae and EOM are normal. No scleral icterus.   Neck: Normal range of motion. Neck supple. No JVD present. No thyromegaly present.   Cardiovascular: Normal rate, regular rhythm and normal heart sounds. "   No murmur heard.  Pulmonary/Chest: Effort normal and breath sounds normal. No respiratory distress. She has no wheezes.   Abdominal: Soft. She exhibits no distension.   Musculoskeletal: Normal range of motion. She exhibits no edema.   Lymphadenopathy:     She has no cervical adenopathy.   Neurological: She is alert and oriented to person, place, and time. She displays normal reflexes. No cranial nerve deficit.   Skin: Skin is warm and dry. No rash noted. She is not diaphoretic. No erythema. No pallor.   Psychiatric: She has a normal mood and affect. Her behavior is normal. Judgment and thought content normal.   Vitals reviewed.      Lab Review:     Results for LILLIAM JEAN (MRN [id number]) as of 6/25/2019 12:12   Ref. Range 2/21/2019 13:41 4/23/2019 11:24   WBC Latest Ref Range: 5.0 - 10.0 K/uL  4.6 (L)   RBC Latest Ref Range: 3.50 - 5.50 M/uL  4.18   Hemoglobin Latest Ref Range: 12.0 - 15.0 g/dL  11.9 (L)   Hematocrit Latest Ref Range: 36.0 - 48.0 %  38.1   MCV Latest Ref Range: 79.0 - 98.0 fL  91.1   MCH Latest Ref Range: 25.0 - 35.0 pg  28.5   MCHC Latest Ref Range: 31.0 - 36.0 g/dL  31.2   RDW Latest Ref Range: 11.7 - 14.9 %  13.0   Platelets Latest Ref Range: 140 - 440 K/uL  260   MPV Latest Ref Range: 8.8 - 12.7 fL  10.0   Gran% Latest Units: %  51.6   Gran # (ANC) Latest Ref Range: 1.4 - 6.5 K/uL  2.4   Lymph% Latest Units: %  37.9   Lymph # Latest Ref Range: 1.2 - 3.4 K/uL  1.7   Mono% Latest Units: %  9.0   Mono # Latest Ref Range: 0.1 - 0.6 K/uL  0.4   Eosinophil% Latest Units: %  0.9   Eos # Latest Ref Range: 0.0 - 0.7 K/uL  0.0   Basophil% Latest Units: %  0.4   Baso # Latest Ref Range: 0.0 - 0.2 K/uL  0.0   nRBC% Latest Units: %  0   Immature Grans (Abs) Latest Ref Range: 0.0 - 1.0 K/uL  0.0   Immature Granulocytes Latest Units: %  0.2   Iron Latest Ref Range: 24 - 162 mcg/dL  42   TIBC Latest Ref Range: 177 - 435 mcg/dL  254   Ferritin Latest Ref Range: 24 - 162 ng/mL  175 (H)   Sodium Latest  Ref Range: 136 - 145 mmol/L 137    Potassium Latest Ref Range: 3.5 - 5.1 mmol/L 4.4    Chloride Latest Ref Range: 95 - 110 mmol/L 109    CO2 Latest Ref Range: 23 - 29 mmol/L 22 (L)    Anion Gap Latest Ref Range: 8 - 16 mmol/L 6 (L)    BUN, Bld Latest Ref Range: 6 - 20 mg/dL 9    Creatinine Latest Ref Range: 0.5 - 1.4 mg/dL 0.7    eGFR if non African American Latest Ref Range: >60 mL/min/1.73 m^2 >60.0    eGFR if African American Latest Ref Range: >60 mL/min/1.73 m^2 >60.0    Glucose Latest Ref Range: 70 - 110 mg/dL 86    Calcium Latest Ref Range: 8.7 - 10.5 mg/dL 9.5    Alkaline Phosphatase Latest Ref Range: 55 - 135 U/L 34 (L)    PROTEIN TOTAL Latest Ref Range: 6.0 - 8.4 g/dL 7.0    Albumin Latest Ref Range: 3.5 - 5.2 g/dL 4.0    BILIRUBIN TOTAL Latest Ref Range: 0.1 - 1.0 mg/dL 0.6    AST Latest Ref Range: 10 - 40 U/L 14    ALT Latest Ref Range: 10 - 44 U/L 8 (L)    Triglycerides Latest Ref Range: 30 - 150 mg/dL 71    Cholesterol Latest Ref Range: 120 - 199 mg/dL 177    HDL Latest Ref Range: 40 - 75 mg/dL 48    Hdl/Cholesterol Ratio Latest Ref Range: 20.0 - 50.0 % 27.1    LDL Cholesterol External Latest Ref Range: 63.0 - 159.0 mg/dL 114.8    Non-HDL Cholesterol Latest Units: mg/dL 129    Total Cholesterol/HDL Ratio Latest Ref Range: 2.0 - 5.0  3.7    Hemoglobin A1C External Latest Ref Range: 4.0 - 5.6 % 5.1    Estimated Avg Glucose Latest Ref Range: 68 - 131 mg/dL 100    GlycoMark (TM) Latest Units: ug/mL 17.8    FSH Latest Ref Range: See Text mIU/mL 7.60    LH Latest Ref Range: See Text mIU/mL 6.7    FRUCTOSAMINE Latest Ref Range: 151 - 300 umol /L 202    % Saturation Latest Units: %  16       Assessment:     1. Type 2 diabetes mellitus with hyperglycemia, without long-term current use of insulin  Uric acid    Fructosamine    Hemoglobin A1c    GlycoMark (TM)   2. Hypothyroidism (acquired)  T4, free    T3    Thyroglobulin    TSH    Uric acid   3. Hypovitaminosis D  Vitamin D    PTH, intact    Calcium, ionized   4.  Goiter     5. Thyroiditis     6. S/P hysterectomy     7. Essential hypertension     8. Hyperlipidemia, unspecified hyperlipidemia type     9. Hypercholesterolemia     10. Attention deficit hyperactivity disorder (ADHD), predominantly inattentive type     11. Pseudoseizures     12. Other depression     13. Migraine without aura and without status migrainosus, not intractable          Regarding hypothyroidism; Patient appears clinically euthyroid. To continue LT4 at present dose and may adjust LT4 dose based on results  Regarding early type 2  diabetes; She is to ensure SMBG checks 2-3 x weekly and remain of metformin for now. To also recheck HBA1c.  To continue strict low glycemic diet adherence as before.   Regarding GERD; symptomatically stable on PPI therapy.  Regarding hypovitaminosis D; to continue vitamin D supplementation and will recheck 25OH vitamin d levels.  Regarding depression; mood stable. To continue Effexor XR as before    Plan:       FFup in ~ 6mths

## 2019-06-26 LAB
25(OH)D3+25(OH)D2 SERPL-MCNC: 44 NG/ML (ref 30–96)
PTH-INTACT SERPL-MCNC: 44 PG/ML (ref 9–77)

## 2019-06-27 LAB
FRUCTOSAMINE SERPL-SCNC: 212 UMOL /L (ref 151–300)
THRYOGLOBULIN INTERPRETATION: ABNORMAL
THYROGLOB AB SERPL-ACNC: <1.8 IU/ML
THYROGLOB SERPL-MCNC: 15 NG/ML

## 2019-06-30 LAB — GLYCOMARK (TM): 18.2 UG/ML

## 2019-07-02 DIAGNOSIS — E78.5 HYPERLIPIDEMIA, UNSPECIFIED HYPERLIPIDEMIA TYPE: ICD-10-CM

## 2019-07-02 DIAGNOSIS — E78.00 HYPERCHOLESTEROLEMIA: ICD-10-CM

## 2019-07-02 RX ORDER — PRAVASTATIN SODIUM 20 MG/1
TABLET ORAL
Qty: 90 TABLET | Refills: 3 | Status: SHIPPED | OUTPATIENT
Start: 2019-07-02 | End: 2019-11-11 | Stop reason: ALTCHOICE

## 2019-07-18 ENCOUNTER — PATIENT OUTREACH (OUTPATIENT)
Dept: ADMINISTRATIVE | Facility: HOSPITAL | Age: 39
End: 2019-07-18

## 2019-07-18 NOTE — LETTER
July 26, 2019    Moris Alfaro  [address]  [facility] [address]             [facility]  [address]  [facility] [address]  Phone: [phone] Moris Alfaro   [address]   [facility] [place] [address]         Dear, Moris Alfaro       [facility] is committed to your overall health.  To help you get the most out of each of your visits, we will review your information to make sure you are up to date on all of your recommended tests and/or procedures.       Dr. Ibis Cuevas has found that your chart shows you may be due for      DIABETIC FOOT EXAM    DIABETIC EYE EXAM     If you have had any of the above done at another facility, please bring the records or information with you so that your record at [facility] will be complete.  If you would like to schedule any of these, please contact the clinic at [phone].     If you are currently taking medication, please bring it with you to your appointment for review.     Also, if you have any type of Advanced Directives, please bring them with you to your office visit so we may scan them into your chart.     Thank You,     Your [facility] Team,   Dr Ibis BARKSDALE,  Panel Care Coordinator   [facility]   [address] [facility] [place] [address]   Phone [phone]   Fax [phone]

## 2019-07-31 ENCOUNTER — TELEPHONE (OUTPATIENT)
Dept: FAMILY MEDICINE | Facility: CLINIC | Age: 39
End: 2019-07-31

## 2019-07-31 NOTE — TELEPHONE ENCOUNTER
Spoke with pt regarding over due eye exam, pt stated she will f/u with her regular eye exams at [facility], declined eye cam.

## 2019-08-20 DIAGNOSIS — E03.9 HYPOTHYROIDISM, UNSPECIFIED TYPE: ICD-10-CM

## 2019-08-20 DIAGNOSIS — D64.9 ANEMIA, UNSPECIFIED TYPE: ICD-10-CM

## 2019-08-21 RX ORDER — FERROUS SULFATE 325(65) MG
TABLET ORAL
Qty: 90 TABLET | Refills: 3 | Status: SHIPPED | OUTPATIENT
Start: 2019-08-21 | End: 2020-11-08

## 2019-08-21 RX ORDER — LIOTHYRONINE SODIUM 5 UG/1
TABLET ORAL
Qty: 90 TABLET | Refills: 3 | Status: SHIPPED | OUTPATIENT
Start: 2019-08-21 | End: 2020-09-03

## 2019-09-05 ENCOUNTER — PATIENT MESSAGE (OUTPATIENT)
Dept: OBSTETRICS AND GYNECOLOGY | Facility: CLINIC | Age: 39
End: 2019-09-05

## 2019-09-09 ENCOUNTER — PATIENT MESSAGE (OUTPATIENT)
Dept: OBSTETRICS AND GYNECOLOGY | Facility: CLINIC | Age: 39
End: 2019-09-09

## 2019-09-30 RX ORDER — GABAPENTIN 800 MG/1
TABLET ORAL
Qty: 60 TABLET | Refills: 0 | Status: SHIPPED | OUTPATIENT
Start: 2019-09-30 | End: 2020-10-06 | Stop reason: SDUPTHER

## 2019-10-29 RX ORDER — BLOOD-GLUCOSE METER
EACH MISCELLANEOUS
Qty: 100 STRIP | Refills: 3 | Status: SHIPPED | OUTPATIENT
Start: 2019-10-29 | End: 2020-07-09

## 2019-10-29 RX ORDER — METRONIDAZOLE 250 MG/1
TABLET ORAL
Qty: 15 TABLET | Refills: 0 | OUTPATIENT
Start: 2019-10-29

## 2019-11-08 DIAGNOSIS — Z12.31 VISIT FOR SCREENING MAMMOGRAM: Primary | ICD-10-CM

## 2019-11-11 ENCOUNTER — OFFICE VISIT (OUTPATIENT)
Dept: OBSTETRICS AND GYNECOLOGY | Facility: CLINIC | Age: 39
End: 2019-11-11
Payer: MEDICAID

## 2019-11-11 ENCOUNTER — HOSPITAL ENCOUNTER (OUTPATIENT)
Dept: RADIOLOGY | Facility: HOSPITAL | Age: 39
Discharge: HOME | End: 2019-11-11
Attending: SPECIALIST
Payer: MEDICAID

## 2019-11-11 VITALS
SYSTOLIC BLOOD PRESSURE: 102 MMHG | HEIGHT: 64 IN | BODY MASS INDEX: 22.7 KG/M2 | RESPIRATION RATE: 18 BRPM | DIASTOLIC BLOOD PRESSURE: 68 MMHG | WEIGHT: 132.94 LBS | WEIGHT: 132.94 LBS | BODY MASS INDEX: 22.7 KG/M2 | HEIGHT: 64 IN

## 2019-11-11 DIAGNOSIS — Z11.3 SCREEN FOR STD (SEXUALLY TRANSMITTED DISEASE): ICD-10-CM

## 2019-11-11 DIAGNOSIS — N76.0 BV (BACTERIAL VAGINOSIS): ICD-10-CM

## 2019-11-11 DIAGNOSIS — Z12.31 ENCOUNTER FOR SCREENING MAMMOGRAM FOR BREAST CANCER: Primary | ICD-10-CM

## 2019-11-11 DIAGNOSIS — B96.89 BV (BACTERIAL VAGINOSIS): ICD-10-CM

## 2019-11-11 DIAGNOSIS — Z12.31 VISIT FOR SCREENING MAMMOGRAM: ICD-10-CM

## 2019-11-11 DIAGNOSIS — Z12.4 ENCOUNTER FOR PAP SMEAR OF CERVIX WITH HPV DNA COTESTING: ICD-10-CM

## 2019-11-11 PROCEDURE — 99213 OFFICE O/P EST LOW 20 MIN: CPT | Mod: S$PBB,,, | Performed by: SPECIALIST

## 2019-11-11 PROCEDURE — 77067 SCR MAMMO BI INCL CAD: CPT | Mod: 26,,, | Performed by: RADIOLOGY

## 2019-11-11 PROCEDURE — 99999 PR PBB SHADOW E&M-EST. PATIENT-LVL IV: CPT | Mod: PBBFAC,,, | Performed by: SPECIALIST

## 2019-11-11 PROCEDURE — 99214 OFFICE O/P EST MOD 30 MIN: CPT | Mod: PBBFAC,PN | Performed by: SPECIALIST

## 2019-11-11 PROCEDURE — 77063 BREAST TOMOSYNTHESIS BI: CPT | Mod: 26,,, | Performed by: RADIOLOGY

## 2019-11-11 PROCEDURE — 77063 MAMMO DIGITAL SCREENING BILAT WITH TOMOSYNTHESIS_CAD: ICD-10-PCS | Mod: 26,,, | Performed by: RADIOLOGY

## 2019-11-11 PROCEDURE — 88175 CYTOPATH C/V AUTO FLUID REDO: CPT | Performed by: PATHOLOGY

## 2019-11-11 PROCEDURE — 99213 PR OFFICE/OUTPT VISIT, EST, LEVL III, 20-29 MIN: ICD-10-PCS | Mod: S$PBB,,, | Performed by: SPECIALIST

## 2019-11-11 PROCEDURE — 87491 CHLMYD TRACH DNA AMP PROBE: CPT

## 2019-11-11 PROCEDURE — 87624 HPV HI-RISK TYP POOLED RSLT: CPT

## 2019-11-11 PROCEDURE — 77067 SCR MAMMO BI INCL CAD: CPT | Mod: TC,PN

## 2019-11-11 PROCEDURE — 99999 PR PBB SHADOW E&M-EST. PATIENT-LVL IV: ICD-10-PCS | Mod: PBBFAC,,, | Performed by: SPECIALIST

## 2019-11-11 PROCEDURE — 77067 MAMMO DIGITAL SCREENING BILAT WITH TOMOSYNTHESIS_CAD: ICD-10-PCS | Mod: 26,,, | Performed by: RADIOLOGY

## 2019-11-11 RX ORDER — CLINDAMYCIN PHOSPHATE 20 MG/G
CREAM VAGINAL NIGHTLY
Qty: 40 G | Refills: 0 | Status: SHIPPED | OUTPATIENT
Start: 2019-11-11 | End: 2020-01-09 | Stop reason: SDUPTHER

## 2019-11-11 NOTE — PROGRESS NOTES
40 yo BF presents for annual gyn evaluation. C/O vaginal odor. Denies dysuria, PP, hematuria, n/v.  Past Medical History:   Diagnosis Date    ADHD (attention deficit hyperactivity disorder)     Anxiety     ZUÑIGA (dyspnea on exertion)     has been worked up    Endometriosis, uterus     uterine bladder and ovaries    Fibromyalgia     Hypertension 1/17/2019    Migraine     Migraine headache     Mitral valve prolapse     Seizures     last seizure a couple of weeks ago, pt states that she has pseudo-seizures    Thyroid disease     Toe fracture        Past Surgical History:   Procedure Laterality Date    dx lap      ecsi      HYSTERECTOMY  2007    CORINA due to DUB,  ovaries intact    lumbar facet injection      [place] EXPLORATORY OF ABDOMEN      RHIZOTOMY      TUBAL LIGATION         Family History   Problem Relation Age of Onset    Thyroid disease Mother     Heart disease Father     Hypertension Father     Pancreatitis Father     Diabetes Father     Stomach cancer Paternal Aunt     Stomach cancer Paternal Uncle     Cancer Paternal Uncle         lung cancer    Melanoma Neg Hx     Psoriasis Neg Hx     Lupus Neg Hx     Eczema Neg Hx        Social History     Socioeconomic History    Marital status:      Spouse name: Not on file    Number of children: Not on file    Years of education: Not on file    Highest education level: Not on file   Occupational History    Not on file   Social Needs    Financial resource strain: Not on file    Food insecurity:     Worry: Not on file     Inability: Not on file    Transportation needs:     Medical: Not on file     Non-medical: Not on file   Tobacco Use    Smoking status: Never Smoker    Smokeless tobacco: Never Used   Substance and Sexual Activity    Alcohol use: Yes     Comment: OCCASIONALLY    Drug use: No    Sexual activity: Yes     Partners: Male     Birth control/protection: None   Lifestyle    Physical activity:     Days per week: Not on  file     Minutes per session: Not on file    Stress: Not on file   Relationships    Social connections:     Talks on phone: Not on file     Gets together: Not on file     Attends Sikh service: Not on file     Active member of club or organization: Not on file     Attends meetings of clubs or organizations: Not on file     Relationship status: Not on file   Other Topics Concern    Are you pregnant or think you may be? Not Asked    Breast-feeding Not Asked   Social History Narrative    Not on file       Current Outpatient Medications   Medication Sig Dispense Refill    chlorhexidine (PERIDEX) 0.12 % solution       cyclobenzaprine (FLEXERIL) 10 MG tablet TAKE TO 1 TABLET BY MOUTH TWICE DAILY AS NEEDED 60 tablet 3    ergocalciferol (ERGOCALCIFEROL) 50,000 unit Cap TAKE ONE CAPSULE BY MOUTH TWICE WEEKLY 24 capsule 3    ferrous sulfate (FEOSOL) 325 mg (65 mg iron) Tab tablet TAKE 1 TABLET BY MOUTH DAILY WITH BREAKFAST 90 tablet 3    gabapentin (NEURONTIN) 800 MG tablet TAKE 1 TABLET BY MOUTH TWICE DAILY 60 tablet 0    levothyroxine (SYNTHROID) 75 MCG tablet TAKE 1 TABLET BY MOUTH ONCE DAILY 90 tablet 3    liothyronine (CYTOMEL) 5 MCG Tab TAKE 1 TABLET BY MOUTH ONCE DAILY 90 tablet 3    ONETOUCH DELICA LANCETS 33 gauge Misc TEST EVERY DAY AS DIRECTED 100 each 3    ONETOUCH VERIO Strp USE AS DIRECTED ONCE A  strip 3    propranolol (INDERAL LA) 60 MG 24 hr capsule Take 1 capsule (60 mg total) by mouth once daily. 30 capsule 0    topiramate (TOPAMAX) 50 MG tablet Take 1 tablet (50 mg total) by mouth 2 (two) times daily. 60 tablet 11    trazodone HCl (TRAZODONE ORAL) Take by mouth.      tretinoin (RETIN-A) 0.05 % cream Thin film to entire face at bedtime 45 g 5     No current facility-administered medications for this visit.        Review of patient's allergies indicates:   Allergen Reactions    Metronidazole Hives and Itching       Review of System:   General: no chills, fever, night sweats,  weight gain or weight loss  Psychological: no depression or suicidal ideation  Breasts: no new or changing breast lumps, nipple discharge or masses.  Respiratory: no cough, shortness of breath, or wheezing  Cardiovascular: no chest pain or dyspnea on exertion  Gastrointestinal: no abdominal pain, change in bowel habits, or black or bloody stools  Genito-Urinary: no incontinence, urinary frequency/urgency or , pelvic pain or abnormal vaginal bleeding.  Musculoskeletal: no gait disturbance or muscular weakness    PE:   APPEARANCE: Well nourished, well developed, in no acute distress.  NECK: Neck symmetric without masses or thyromegaly.  NODES: No inguinal lymph node enlargement.  ABDOMEN: Soft. No tenderness or masses. No hepatosplenomegaly. No hernias.  BREASTS: Symmetrical, no skin changes or visible lesions. No palpable masses, nipple discharge or adenopathy bilaterally.  PELVIC: Normal external female genitalia without lesions. Normal hair distribution. Adequate perineal body, normal urethral meatus. Vagina moist and well rugated without lesions. POSITIVE WHITE FROTH D/C WITH POS WHIFF. No significant cystocele or rectocele. Uterus and cervix surgically absent. Bimanual exam revealed no masses, tenderness or abnormality.    Will treat clinical BV and pt requests STD screen   BSE monthly   mammo screeningtoday   RTO 1 year/prn  At the end of patient visit, nurse and MD both asked pt if any improvements needed in visit experience and asked whether any further pt questions or concerns.

## 2019-11-13 LAB
C TRACH DNA SPEC QL NAA+PROBE: NOT DETECTED
N GONORRHOEA DNA SPEC QL NAA+PROBE: NOT DETECTED

## 2019-11-14 LAB
FINAL PATHOLOGIC DIAGNOSIS: NORMAL
HPV HR 12 DNA SPEC QL NAA+PROBE: NEGATIVE
HPV16 AG SPEC QL: NEGATIVE
HPV18 DNA SPEC QL NAA+PROBE: NEGATIVE
Lab: NORMAL

## 2019-11-21 ENCOUNTER — PATIENT MESSAGE (OUTPATIENT)
Dept: OBSTETRICS AND GYNECOLOGY | Facility: CLINIC | Age: 39
End: 2019-11-21

## 2019-11-27 ENCOUNTER — LAB VISIT (OUTPATIENT)
Dept: LAB | Facility: HOSPITAL | Age: 39
End: 2019-11-27
Attending: SPECIALIST
Payer: MEDICAID

## 2019-11-27 DIAGNOSIS — Z11.3 SCREEN FOR STD (SEXUALLY TRANSMITTED DISEASE): ICD-10-CM

## 2019-11-27 DIAGNOSIS — E11.65 TYPE 2 DIABETES MELLITUS WITH HYPERGLYCEMIA, WITHOUT LONG-TERM CURRENT USE OF INSULIN: ICD-10-CM

## 2019-11-27 LAB
ALBUMIN SERPL BCP-MCNC: 4 G/DL (ref 3.5–5.2)
ANION GAP SERPL CALC-SCNC: 7 MMOL/L (ref 8–16)
BUN SERPL-MCNC: 11 MG/DL (ref 6–20)
CALCIUM SERPL-MCNC: 9 MG/DL (ref 8.7–10.5)
CHLORIDE SERPL-SCNC: 107 MMOL/L (ref 95–110)
CO2 SERPL-SCNC: 24 MMOL/L (ref 23–29)
CREAT SERPL-MCNC: 0.7 MG/DL (ref 0.5–1.4)
EST. GFR  (AFRICAN AMERICAN): >60 ML/MIN/1.73 M^2
EST. GFR  (NON AFRICAN AMERICAN): >60 ML/MIN/1.73 M^2
ESTIMATED AVG GLUCOSE: 97 MG/DL (ref 68–131)
GLUCOSE SERPL-MCNC: 71 MG/DL (ref 70–110)
HBA1C MFR BLD HPLC: 5 % (ref 4–5.6)
PHOSPHATE SERPL-MCNC: 3.5 MG/DL (ref 2.7–4.5)
POTASSIUM SERPL-SCNC: 3.7 MMOL/L (ref 3.5–5.1)
SODIUM SERPL-SCNC: 138 MMOL/L (ref 136–145)
T3 SERPL-MCNC: 87 NG/DL (ref 60–180)
T4 FREE SERPL-MCNC: 1 NG/DL (ref 0.71–1.51)
TSH SERPL DL<=0.005 MIU/L-ACNC: 0.69 UIU/ML (ref 0.4–4)

## 2019-11-27 PROCEDURE — 84443 ASSAY THYROID STIM HORMONE: CPT

## 2019-11-27 PROCEDURE — 86592 SYPHILIS TEST NON-TREP QUAL: CPT

## 2019-11-27 PROCEDURE — 84480 ASSAY TRIIODOTHYRONINE (T3): CPT

## 2019-11-27 PROCEDURE — 86696 HERPES SIMPLEX TYPE 2 TEST: CPT

## 2019-11-27 PROCEDURE — 86694 HERPES SIMPLEX NES ANTBDY: CPT

## 2019-11-27 PROCEDURE — 84439 ASSAY OF FREE THYROXINE: CPT

## 2019-11-27 PROCEDURE — 82985 ASSAY OF GLYCATED PROTEIN: CPT

## 2019-11-27 PROCEDURE — 86703 HIV-1/HIV-2 1 RESULT ANTBDY: CPT

## 2019-11-27 PROCEDURE — 84378 SUGARS SINGLE QUANT: CPT

## 2019-11-27 PROCEDURE — 83036 HEMOGLOBIN GLYCOSYLATED A1C: CPT | Mod: 59

## 2019-11-27 PROCEDURE — 80074 ACUTE HEPATITIS PANEL: CPT

## 2019-11-27 PROCEDURE — 80069 RENAL FUNCTION PANEL: CPT

## 2019-11-29 LAB
HAV IGM SERPL QL IA: NEGATIVE
HBV CORE IGM SERPL QL IA: NEGATIVE
HBV SURFACE AG SERPL QL IA: NEGATIVE
HCV AB SERPL QL IA: NEGATIVE
HIV 1+2 AB+HIV1 P24 AG SERPL QL IA: NEGATIVE
HSV1 IGG SERPL QL IA: NEGATIVE
HSV2 IGG SERPL QL IA: NEGATIVE
RPR SER QL: NORMAL

## 2019-11-30 LAB
FRUCTOSAMINE SERPL-SCNC: 230 UMOL /L (ref 151–300)
HSV AB, IGM BY EIA: NEGATIVE

## 2019-12-04 LAB — GLYCOMARK (TM): 19.7 UG/ML

## 2020-01-09 RX ORDER — CLINDAMYCIN PHOSPHATE 20 MG/G
CREAM VAGINAL NIGHTLY
Qty: 40 G | Refills: 0 | Status: SHIPPED | OUTPATIENT
Start: 2020-01-09 | End: 2020-03-27

## 2020-01-09 NOTE — TELEPHONE ENCOUNTER
Patient requesting medication refill. Last visit was on 11/11/19. Allergies and pharmacy reviewed.

## 2020-01-13 DIAGNOSIS — R94.6 ABNORMAL THYROID FUNCTION TEST: ICD-10-CM

## 2020-01-13 DIAGNOSIS — E03.4 HYPOTHYROIDISM DUE TO ACQUIRED ATROPHY OF THYROID: ICD-10-CM

## 2020-01-13 RX ORDER — LEVOTHYROXINE SODIUM 75 UG/1
TABLET ORAL
Qty: 90 TABLET | Refills: 3 | Status: SHIPPED | OUTPATIENT
Start: 2020-01-13 | End: 2021-02-27

## 2020-01-27 DIAGNOSIS — E55.9 HYPOVITAMINOSIS D: ICD-10-CM

## 2020-01-27 RX ORDER — ERGOCALCIFEROL 1.25 MG/1
CAPSULE ORAL
Qty: 12 CAPSULE | Refills: 3 | Status: SHIPPED | OUTPATIENT
Start: 2020-01-27 | End: 2021-01-26

## 2020-02-17 RX ORDER — CYCLOBENZAPRINE HCL 10 MG
TABLET ORAL
Qty: 60 TABLET | Refills: 3 | OUTPATIENT
Start: 2020-02-17

## 2020-02-21 ENCOUNTER — PATIENT MESSAGE (OUTPATIENT)
Dept: DERMATOLOGY | Facility: CLINIC | Age: 40
End: 2020-02-21

## 2020-03-27 ENCOUNTER — PATIENT MESSAGE (OUTPATIENT)
Dept: DERMATOLOGY | Facility: CLINIC | Age: 40
End: 2020-03-27

## 2020-03-27 ENCOUNTER — OFFICE VISIT (OUTPATIENT)
Dept: DERMATOLOGY | Facility: CLINIC | Age: 40
End: 2020-03-27
Payer: MEDICAID

## 2020-03-27 DIAGNOSIS — L70.0 ACNE VULGARIS: Primary | ICD-10-CM

## 2020-03-27 DIAGNOSIS — Z86.19 HISTORY OF CANDIDAL VULVOVAGINITIS: ICD-10-CM

## 2020-03-27 PROCEDURE — 99499 NO LOS: ICD-10-PCS | Mod: 95,S$GLB,, | Performed by: DERMATOLOGY

## 2020-03-27 PROCEDURE — 99499 UNLISTED E&M SERVICE: CPT | Mod: 95,S$GLB,, | Performed by: DERMATOLOGY

## 2020-03-27 RX ORDER — DOXYCYCLINE 100 MG/1
TABLET ORAL
Qty: 30 TABLET | Refills: 2 | Status: SHIPPED | OUTPATIENT
Start: 2020-03-27 | End: 2021-02-09

## 2020-03-27 RX ORDER — TRETINOIN 0.5 MG/G
CREAM TOPICAL
Qty: 45 G | Refills: 5 | Status: SHIPPED | OUTPATIENT
Start: 2020-03-27 | End: 2021-02-24 | Stop reason: ALTCHOICE

## 2020-03-27 RX ORDER — FLUCONAZOLE 150 MG/1
TABLET ORAL
Qty: 4 TABLET | Refills: 0 | Status: SHIPPED | OUTPATIENT
Start: 2020-03-27 | End: 2021-02-09

## 2020-03-27 RX ORDER — TRETINOIN 0.5 MG/G
CREAM TOPICAL
Qty: 45 G | Refills: 5 | Status: SHIPPED | OUTPATIENT
Start: 2020-03-27 | End: 2020-03-27

## 2020-03-27 RX ORDER — CLINDAMYCIN PHOSPHATE 10 MG/G
GEL TOPICAL
Qty: 60 G | Refills: 2 | Status: SHIPPED | OUTPATIENT
Start: 2020-03-27 | End: 2021-02-24 | Stop reason: ALTCHOICE

## 2020-03-27 NOTE — PROGRESS NOTES
Subjective:       Patient ID:  Moris Alfaro is a 40 y.o. female who presents for   Chief Complaint   Patient presents with    Acne     LAST SEEN 11/2018 with acne vulgaris    Good results with spironolactone 25 mg BID, however, se of palpitations?, cardiologist recommended discontinuation  Uses BP wash  Uses tretinoin cream QHS (0.05%)  Clindamycin gel in am    Post doxy 100 QD x 3 months, with complication of yeast vaginitis  Uses BP wash in shower    Hysterectomy patient, nl kidney function, no large consumption of K rich foods or drinks    Bad flare in the past 2-3 months    Current Outpatient Medications:     chlorhexidine (PERIDEX) 0.12 % solution, , Disp: , Rfl:     clindamycin (CLINDESSE) 2 % vaginal cream, Place vaginally every evening., Disp: 40 g, Rfl: 0    cyclobenzaprine (FLEXERIL) 10 MG tablet, TAKE TO 1 TABLET BY MOUTH TWICE DAILY AS NEEDED, Disp: 60 tablet, Rfl: 3    ergocalciferol (ERGOCALCIFEROL) 50,000 unit Cap, TAKE ONE CAPSULE BY MOUTH EVERY 7 DAYS, Disp: 12 capsule, Rfl: 3    ferrous sulfate (FEOSOL) 325 mg (65 mg iron) Tab tablet, TAKE 1 TABLET BY MOUTH DAILY WITH BREAKFAST, Disp: 90 tablet, Rfl: 3    gabapentin (NEURONTIN) 800 MG tablet, TAKE 1 TABLET BY MOUTH TWICE DAILY, Disp: 60 tablet, Rfl: 0    levothyroxine (SYNTHROID) 75 MCG tablet, TAKE 1 TABLET BY MOUTH EVERY DAY, Disp: 90 tablet, Rfl: 3    liothyronine (CYTOMEL) 5 MCG Tab, TAKE 1 TABLET BY MOUTH ONCE DAILY, Disp: 90 tablet, Rfl: 3    ONETOUCH DELICA LANCETS 33 gauge Misc, TEST EVERY DAY AS DIRECTED, Disp: 100 each, Rfl: 3    ONETOUCH VERIO Strp, USE AS DIRECTED ONCE A DAY, Disp: 100 strip, Rfl: 3    propranolol (INDERAL LA) 60 MG 24 hr capsule, Take 1 capsule (60 mg total) by mouth once daily., Disp: 30 capsule, Rfl: 0    topiramate (TOPAMAX) 50 MG tablet, Take 1 tablet (50 mg total) by mouth 2 (two) times daily., Disp: 60 tablet, Rfl: 11    trazodone HCl (TRAZODONE ORAL), Take by mouth., Disp: , Rfl:      tretinoin (RETIN-A) 0.05 % cream, Thin film to entire face at bedtime, Disp: 45 g, Rfl: 5    THIS WAS MEANT TO BE A TELEDERM VIRTUAL VISIT OVER AUDIO AND VIDEO  COULD NOT GET SYSTEM TO FUNCTION ON PATIENT'S END, NO FACETIME OR SKYPE CAPABILITY, RELIED ON PICTURES PROVIDED BY PATIENT AND AUDIO CONVERSATION              Review of Systems   Constitutional: Negative for fever and chills.        Objective:    Physical Exam   Constitutional: She appears well-developed and well-nourished. No distress.   Neurological: She is alert and oriented to person, place, and time. She is not disoriented.   Psychiatric: She has a normal mood and affect.   Skin:   Areas Examined (abnormalities noted in diagram):   Head / Face Inspection Performed              Diagram Legend     Erythematous scaling macule/papule c/w actinic keratosis       Vascular papule c/w angioma      Pigmented verrucoid papule/plaque c/w seborrheic keratosis      Yellow umbilicated papule c/w sebaceous hyperplasia      Irregularly shaped tan macule c/w lentigo     1-2 mm smooth white papules consistent with Milia      Movable subcutaneous cyst with punctum c/w epidermal inclusion cyst      Subcutaneous movable cyst c/w pilar cyst      Firm pink to brown papule c/w dermatofibroma      Pedunculated fleshy papule(s) c/w skin tag(s)      Evenly pigmented macule c/w junctional nevus     Mildly variegated pigmented, slightly irregular-bordered macule c/w mildly atypical nevus      Flesh colored to evenly pigmented papule c/w intradermal nevus       Pink pearly papule/plaque c/w basal cell carcinoma      Erythematous hyperkeratotic cursted plaque c/w SCC      Surgical scar with no sign of skin cancer recurrence      Open and closed comedones      Inflammatory papules and pustules      Verrucoid papule consistent consistent with wart     Erythematous eczematous patches and plaques     Dystrophic onycholytic nail with subungual debris c/w onychomycosis     Umbilicated  papule    Erythematous-base heme-crusted tan verrucoid plaque consistent with inflamed seborrheic keratosis     Erythematous Silvery Scaling Plaque c/w Psoriasis     See annotation                  Assessment / Plan:        Acne vulgaris  -     doxycycline monohydrate 100 mg Tab; Take 1 po qday with food  Dispense: 30 tablet; Refill: 2  Refilled tretinoin cream 0.05% and clindagel QAM    Discussed isotretinoin- consider post covid, don't want to go to the lab now (baseline labs)    Patient needs spironolactone for long term management however did not tolerate; consider discussing with cardiologist if can retry      History of candidal vulvovaginitis  -     fluconazole (DIFLUCAN) 150 MG Tab; One tab PO  X 1 dose PRN yeast vaginitis  Dispense: 4 tablet; Refill: 0  Add probiotics while on abx               No follow-ups on file.

## 2020-03-30 ENCOUNTER — PATIENT MESSAGE (OUTPATIENT)
Dept: DERMATOLOGY | Facility: CLINIC | Age: 40
End: 2020-03-30

## 2020-04-08 ENCOUNTER — PATIENT MESSAGE (OUTPATIENT)
Dept: DERMATOLOGY | Facility: CLINIC | Age: 40
End: 2020-04-08

## 2020-04-15 ENCOUNTER — PATIENT MESSAGE (OUTPATIENT)
Dept: DERMATOLOGY | Facility: CLINIC | Age: 40
End: 2020-04-15

## 2020-04-17 ENCOUNTER — PATIENT MESSAGE (OUTPATIENT)
Dept: ENDOCRINOLOGY | Facility: CLINIC | Age: 40
End: 2020-04-17

## 2020-04-17 ENCOUNTER — PATIENT MESSAGE (OUTPATIENT)
Dept: DERMATOLOGY | Facility: CLINIC | Age: 40
End: 2020-04-17

## 2020-04-20 ENCOUNTER — OFFICE VISIT (OUTPATIENT)
Dept: URGENT CARE | Facility: CLINIC | Age: 40
End: 2020-04-20
Payer: MEDICAID

## 2020-04-20 VITALS
RESPIRATION RATE: 14 BRPM | DIASTOLIC BLOOD PRESSURE: 61 MMHG | HEIGHT: 65 IN | HEART RATE: 74 BPM | WEIGHT: 132 LBS | SYSTOLIC BLOOD PRESSURE: 92 MMHG | TEMPERATURE: 99 F | BODY MASS INDEX: 21.99 KG/M2 | OXYGEN SATURATION: 98 %

## 2020-04-20 DIAGNOSIS — R59.1 LYMPHADENOPATHY: Primary | ICD-10-CM

## 2020-04-20 PROCEDURE — 99204 OFFICE O/P NEW MOD 45 MIN: CPT | Mod: S$GLB,,, | Performed by: NURSE PRACTITIONER

## 2020-04-20 PROCEDURE — 99204 PR OFFICE/OUTPT VISIT, NEW, LEVL IV, 45-59 MIN: ICD-10-PCS | Mod: S$GLB,,, | Performed by: NURSE PRACTITIONER

## 2020-04-20 NOTE — PROGRESS NOTES
"Subjective:       Patient ID: Moris Alfaro is a 40 y.o. female.    Vitals:  height is 5' 4.5" (1.638 m) and weight is 59.9 kg (132 lb). Her oral temperature is 98.6 °F (37 °C). Her blood pressure is 92/61 and her pulse is 74. Her respiration is 14 and oxygen saturation is 98%.     Chief Complaint: Cyst    Moris Alfaro is a 40 year old female presenting to the clinic with swelling under her chin. She states that she noticed it one week ago and that it was tender to the touch. The swelling and tenderness have improved. She has had no fever, difficulty breathing/swallowing. She is currently taking doxycycline for acne.     Cyst   Chronicity: Under chin developed a knot. The current episode started in the past 7 days. The problem occurs constantly. The problem has been gradually improving. Pertinent negatives include no arthralgias, chest pain, chills, congestion, coughing, fatigue, fever, headaches, joint swelling, myalgias, nausea, rash, sore throat, vertigo, vomiting or weakness. She has tried nothing for the symptoms.       Constitution: Negative for chills, fatigue and fever.   HENT: Negative for congestion and sore throat.    Neck: Positive for neck swelling. Negative for painful lymph nodes.   Cardiovascular: Negative for chest pain and leg swelling.   Eyes: Negative for double vision and blurred vision.   Respiratory: Negative for cough and shortness of breath.    Gastrointestinal: Negative for nausea, vomiting and diarrhea.   Genitourinary: Negative for dysuria, frequency, urgency and history of kidney stones.   Musculoskeletal: Negative for joint pain, joint swelling, muscle cramps and muscle ache.   Skin: Negative for color change, pale, rash and bruising.   Allergic/Immunologic: Negative for seasonal allergies.   Neurological: Negative for dizziness, history of vertigo, light-headedness, passing out and headaches.   Hematologic/Lymphatic: Negative for swollen lymph nodes. "   Psychiatric/Behavioral: Negative for nervous/anxious, sleep disturbance and depression. The patient is not nervous/anxious.        Objective:      Physical Exam   Constitutional: She is oriented to person, place, and time. She appears well-developed and well-nourished. She is cooperative.  Non-toxic appearance. She does not appear ill. No distress.   HENT:   Head: Normocephalic and atraumatic.   Right Ear: Hearing, tympanic membrane, external ear and ear canal normal.   Left Ear: Hearing, tympanic membrane, external ear and ear canal normal.   Nose: Nose normal. No mucosal edema, rhinorrhea or nasal deformity. No epistaxis. Right sinus exhibits no maxillary sinus tenderness and no frontal sinus tenderness. Left sinus exhibits no maxillary sinus tenderness and no frontal sinus tenderness.   Mouth/Throat: Uvula is midline, oropharynx is clear and moist and mucous membranes are normal. No trismus in the jaw. Normal dentition. No uvula swelling. No posterior oropharyngeal erythema.   Eyes: Conjunctivae and lids are normal. Right eye exhibits no discharge. Left eye exhibits no discharge. No scleral icterus.   Neck: Trachea normal, normal range of motion, full passive range of motion without pain and phonation normal. Neck supple.   Cardiovascular: Normal rate, regular rhythm, normal heart sounds, intact distal pulses and normal pulses.   Pulmonary/Chest: Effort normal and breath sounds normal. No respiratory distress.   Abdominal: Soft. Normal appearance and bowel sounds are normal. She exhibits no distension, no pulsatile midline mass and no mass. There is no tenderness.   Musculoskeletal: Normal range of motion. She exhibits no edema or deformity.   Lymphadenopathy:   0.5 cm mobile, nontender submandibular nodule. No overlying erythema or warmth. Minimally tender to palpation   Neurological: She is alert and oriented to person, place, and time. She exhibits normal muscle tone. Coordination normal.   Skin: Skin is  warm, dry, intact, not diaphoretic and not pale.        Psychiatric: She has a normal mood and affect. Her speech is normal and behavior is normal. Judgment and thought content normal. Cognition and memory are normal.   Nursing note and vitals reviewed.        Assessment:       1. Lymphadenopathy        Plan:       Patient has small enlarged lymph node (submandibular) likely from acne. She is having no difficulty breathing/swallowing. No evidence of secondary infection. Patient advised to continue doxycycline and follow up as needed.   Lymphadenopathy

## 2020-04-21 NOTE — PATIENT INSTRUCTIONS
Lymphadenopathy  Lymphadenopathy is swelling of the lymph nodes. Lymph nodes are small, bean-shaped glands around the body.  What are lymph nodes?  Lymph nodes are part of your immune system. The glands are found in your neck, armpits, groin, chest, and abdomen. They act as filters for lymph fluid as it flows through your body. Lymph fluid contains white blood cells and other things that fight infection.  Why lymph nodes swell  Lymphadenopathy is very common. The glands often enlarge during a viral or bacterial infection. It can happen during a cold, the flu, or strep throat. The nodes may swell in just one area of the body, such as the neck (localized). Or nodes may swell all over the body (generalized). The neck (cervical) lymph nodes are the most common site of lymphadenopathy.  What causes lymphadenopathy?  Dead cells and fluid build up in the lymph nodes as they help fight infection or disease. This causes them to swell in size. Enlarged lymph nodes are often near the source of infection. This can help to find the cause of an infection. For example, swollen lymph nodes around the jaw may be because of an infection in the teeth or mouth. But lymphadenopathy may also be generalized. This is common in some viral illnesses such as mononucleosis or chickenpox (varicella).  Lymphadenopathy can also be caused by:  · Infection of a lymph node or small group of nodes (lymphadenitis)  · Cancer  · Reactions to medicines such as antibiotics and some seizure medicines  · Other health conditions, such as lupus  Symptoms of lymphadenopathy  Lymphadenopathy can cause symptoms such as:  · Lumps under the jaw, on the sides or back of the neck, in the armpits, in the groin, or in the chest or belly (abdomen)  · Pain or tenderness in any of these areas  · Redness or warmth in any of these areas  You may also have symptoms from an infection causing the swollen glands. These symptoms may include fever, sore throat, body aches, or  cough.  Diagnosing lymphadenopathy  Your health care provider will ask about your health history and symptoms. He or she will give you a physical exam and check the areas where lymph nodes are enlarged. Your health care provider will check the size and location of the nodes, and ask how long they have been swollen and if they are painful. Diagnostic tests and referral to specialists may be recommended. They may include:  · Blood tests. These are done to check for signs of infection and other problems.  · Urine test. This is also done to check for infection and other problems.  · Chest X-ray. This test can show enlarged lymph nodes or other problems.  · Lymph node biopsy. If lymph nodes are swollen for 3 to 4 weeks, they may be checked with a biopsy. Small samples of lymph node tissue are taken and checked in a lab for signs of cancer. You may be referred to a specialist in blood disorders and cancer (hematologist and oncologist).  Treatment for lymphadenopathy  The treatment of enlarged lymph nodes depends on the cause. Enlarged lymph nodes are often harmless and go away without any treatment. Treatment is most often done on the cause of the enlarged nodes and may include:  · Antibiotic medicine to treat a bacterial infection  · Incision and drainage (I & D) of a lymph node for lymphadenitis  · Other medicines or procedures to treat the cause of the enlarged nodes  You may need follow-up exam in 3 to 4 weeks to recheck enlarged nodes.     When to call your health care provider  Call your health care provider if you have lymph nodes that are still swollen after 3 to 4 weeks.   Date Last Reviewed: 6/19/2015  © 3826-8701 [employer]. [address] [place] [address], [place] [address]. All rights reserved. This information is not intended as a substitute for professional medical care. Always follow your healthcare professional's instructions.

## 2020-04-27 RX ORDER — GABAPENTIN 800 MG/1
TABLET ORAL
Qty: 60 TABLET | Refills: 0 | OUTPATIENT
Start: 2020-04-27

## 2020-04-29 ENCOUNTER — LAB VISIT (OUTPATIENT)
Dept: LAB | Facility: HOSPITAL | Age: 40
End: 2020-04-29
Attending: SPECIALIST
Payer: MEDICAID

## 2020-04-29 DIAGNOSIS — E03.9 MYXEDEMA HEART DISEASE: ICD-10-CM

## 2020-04-29 DIAGNOSIS — I51.9 MYXEDEMA HEART DISEASE: ICD-10-CM

## 2020-04-29 DIAGNOSIS — I95.2 HYPOTENSION DUE TO DRUGS: Primary | ICD-10-CM

## 2020-04-29 LAB
BASOPHILS # BLD AUTO: 0.02 K/UL (ref 0–0.2)
BASOPHILS # BLD AUTO: 0.02 K/UL (ref 0–0.2)
BASOPHILS NFR BLD: 0.5 % (ref 0–1.9)
BASOPHILS NFR BLD: 0.5 % (ref 0–1.9)
DIFFERENTIAL METHOD: ABNORMAL
DIFFERENTIAL METHOD: ABNORMAL
EOSINOPHIL # BLD AUTO: 0 K/UL (ref 0–0.5)
EOSINOPHIL # BLD AUTO: 0 K/UL (ref 0–0.5)
EOSINOPHIL NFR BLD: 0.9 % (ref 0–8)
EOSINOPHIL NFR BLD: 0.9 % (ref 0–8)
ERYTHROCYTE [DISTWIDTH] IN BLOOD BY AUTOMATED COUNT: 12.8 % (ref 11.5–14.5)
ERYTHROCYTE [DISTWIDTH] IN BLOOD BY AUTOMATED COUNT: 12.8 % (ref 11.5–14.5)
HCT VFR BLD AUTO: 40.9 % (ref 37–48.5)
HCT VFR BLD AUTO: 40.9 % (ref 37–48.5)
HGB BLD-MCNC: 12.2 G/DL (ref 12–16)
HGB BLD-MCNC: 12.2 G/DL (ref 12–16)
IMM GRANULOCYTES # BLD AUTO: 0.02 K/UL (ref 0–0.04)
IMM GRANULOCYTES # BLD AUTO: 0.02 K/UL (ref 0–0.04)
IMM GRANULOCYTES NFR BLD AUTO: 0.5 % (ref 0–0.5)
IMM GRANULOCYTES NFR BLD AUTO: 0.5 % (ref 0–0.5)
LYMPHOCYTES # BLD AUTO: 1.6 K/UL (ref 1–4.8)
LYMPHOCYTES # BLD AUTO: 1.6 K/UL (ref 1–4.8)
LYMPHOCYTES NFR BLD: 35.1 % (ref 18–48)
LYMPHOCYTES NFR BLD: 35.1 % (ref 18–48)
MAGNESIUM SERPL-MCNC: 1.8 MG/DL (ref 1.6–2.6)
MCH RBC QN AUTO: 27.6 PG (ref 27–31)
MCH RBC QN AUTO: 27.6 PG (ref 27–31)
MCHC RBC AUTO-ENTMCNC: 29.8 G/DL (ref 32–36)
MCHC RBC AUTO-ENTMCNC: 29.8 G/DL (ref 32–36)
MCV RBC AUTO: 93 FL (ref 82–98)
MCV RBC AUTO: 93 FL (ref 82–98)
MONOCYTES # BLD AUTO: 0.4 K/UL (ref 0.3–1)
MONOCYTES # BLD AUTO: 0.4 K/UL (ref 0.3–1)
MONOCYTES NFR BLD: 9.7 % (ref 4–15)
MONOCYTES NFR BLD: 9.7 % (ref 4–15)
NEUTROPHILS # BLD AUTO: 2.4 K/UL (ref 1.8–7.7)
NEUTROPHILS # BLD AUTO: 2.4 K/UL (ref 1.8–7.7)
NEUTROPHILS NFR BLD: 53.3 % (ref 38–73)
NEUTROPHILS NFR BLD: 53.3 % (ref 38–73)
NRBC BLD-RTO: 0 /100 WBC
NRBC BLD-RTO: 0 /100 WBC
PLATELET # BLD AUTO: 187 K/UL (ref 150–350)
PLATELET # BLD AUTO: 187 K/UL (ref 150–350)
PMV BLD AUTO: 11.1 FL (ref 9.2–12.9)
PMV BLD AUTO: 11.1 FL (ref 9.2–12.9)
RBC # BLD AUTO: 4.42 M/UL (ref 4–5.4)
RBC # BLD AUTO: 4.42 M/UL (ref 4–5.4)
TSH SERPL DL<=0.005 MIU/L-ACNC: 0.36 UIU/ML (ref 0.4–4)
WBC # BLD AUTO: 4.44 K/UL (ref 3.9–12.7)
WBC # BLD AUTO: 4.44 K/UL (ref 3.9–12.7)

## 2020-04-29 PROCEDURE — 83735 ASSAY OF MAGNESIUM: CPT

## 2020-04-29 PROCEDURE — 84439 ASSAY OF FREE THYROXINE: CPT

## 2020-04-29 PROCEDURE — 80053 COMPREHEN METABOLIC PANEL: CPT

## 2020-04-29 PROCEDURE — 84443 ASSAY THYROID STIM HORMONE: CPT

## 2020-04-29 PROCEDURE — 85025 COMPLETE CBC W/AUTO DIFF WBC: CPT

## 2020-04-29 PROCEDURE — 36415 COLL VENOUS BLD VENIPUNCTURE: CPT | Mod: PO

## 2020-04-30 ENCOUNTER — PATIENT MESSAGE (OUTPATIENT)
Dept: DERMATOLOGY | Facility: CLINIC | Age: 40
End: 2020-04-30

## 2020-04-30 LAB
ALBUMIN SERPL BCP-MCNC: 3.8 G/DL (ref 3.5–5.2)
ALP SERPL-CCNC: 37 U/L (ref 55–135)
ALT SERPL W/O P-5'-P-CCNC: 10 U/L (ref 10–44)
ANION GAP SERPL CALC-SCNC: 7 MMOL/L (ref 8–16)
AST SERPL-CCNC: 12 U/L (ref 10–40)
BILIRUB SERPL-MCNC: 0.4 MG/DL (ref 0.1–1)
BUN SERPL-MCNC: 8 MG/DL (ref 6–20)
CALCIUM SERPL-MCNC: 9 MG/DL (ref 8.7–10.5)
CHLORIDE SERPL-SCNC: 109 MMOL/L (ref 95–110)
CO2 SERPL-SCNC: 21 MMOL/L (ref 23–29)
CREAT SERPL-MCNC: 0.8 MG/DL (ref 0.5–1.4)
EST. GFR  (AFRICAN AMERICAN): >60 ML/MIN/1.73 M^2
EST. GFR  (NON AFRICAN AMERICAN): >60 ML/MIN/1.73 M^2
GLUCOSE SERPL-MCNC: 79 MG/DL (ref 70–110)
POTASSIUM SERPL-SCNC: 4.7 MMOL/L (ref 3.5–5.1)
PROT SERPL-MCNC: 6.8 G/DL (ref 6–8.4)
SODIUM SERPL-SCNC: 137 MMOL/L (ref 136–145)
T4 FREE SERPL-MCNC: 0.8 NG/DL (ref 0.71–1.51)

## 2020-05-01 ENCOUNTER — PATIENT MESSAGE (OUTPATIENT)
Dept: DERMATOLOGY | Facility: CLINIC | Age: 40
End: 2020-05-01

## 2020-05-01 NOTE — TELEPHONE ENCOUNTER
Who did PA? What was reason for denial? Can it be appealed?  Good rx tretinoin $32 for 20g tube, clindamycin GEL $27 FOR 30G TUBE

## 2020-05-11 ENCOUNTER — PATIENT MESSAGE (OUTPATIENT)
Dept: DERMATOLOGY | Facility: CLINIC | Age: 40
End: 2020-05-11

## 2020-05-11 NOTE — TELEPHONE ENCOUNTER
Her medication has been denied twice by her insurance and requires a peer to peer with Medical director.  Patient given option of good rx to fill.  Wants to speak to you to discuss.

## 2020-10-06 ENCOUNTER — OFFICE VISIT (OUTPATIENT)
Dept: RHEUMATOLOGY | Facility: CLINIC | Age: 40
End: 2020-10-06
Payer: MEDICAID

## 2020-10-06 VITALS
DIASTOLIC BLOOD PRESSURE: 69 MMHG | BODY MASS INDEX: 22.9 KG/M2 | WEIGHT: 135.5 LBS | TEMPERATURE: 98 F | SYSTOLIC BLOOD PRESSURE: 107 MMHG

## 2020-10-06 DIAGNOSIS — M79.7 FIBROMYALGIA: Primary | ICD-10-CM

## 2020-10-06 PROCEDURE — 99213 OFFICE O/P EST LOW 20 MIN: CPT | Mod: S$GLB,,, | Performed by: INTERNAL MEDICINE

## 2020-10-06 PROCEDURE — 99213 PR OFFICE/OUTPT VISIT, EST, LEVL III, 20-29 MIN: ICD-10-PCS | Mod: S$GLB,,, | Performed by: INTERNAL MEDICINE

## 2020-10-06 RX ORDER — PROPRANOLOL HYDROCHLORIDE 80 MG/1
CAPSULE, EXTENDED RELEASE ORAL
COMMUNITY
Start: 2020-09-18 | End: 2023-03-08

## 2020-10-06 RX ORDER — GABAPENTIN 800 MG/1
800 TABLET ORAL 2 TIMES DAILY
Qty: 60 TABLET | Refills: 3 | Status: SHIPPED | OUTPATIENT
Start: 2020-10-06 | End: 2021-03-01 | Stop reason: SDUPTHER

## 2020-10-06 RX ORDER — CYCLOBENZAPRINE HCL 10 MG
TABLET ORAL
Qty: 60 TABLET | Refills: 3 | Status: SHIPPED | OUTPATIENT
Start: 2020-10-06 | End: 2021-01-28

## 2020-10-06 RX ORDER — TRAZODONE HYDROCHLORIDE 50 MG/1
50 TABLET ORAL NIGHTLY
Qty: 30 TABLET | Refills: 3 | Status: SHIPPED | OUTPATIENT
Start: 2020-10-06 | End: 2021-02-09 | Stop reason: SDUPTHER

## 2020-10-06 NOTE — PROGRESS NOTES
[facility] RHEUMATOLOGY            PROGRESS NOTE      Subjective:       Patient ID:   NAME: Moris Alfaro : 1980     40 y.o. female    Referring Doc: No ref. provider found  Other Physicians:    Chief Complaint:  Fibromyalgia      History of Present Illness:     Patient returns today for a regularly scheduled follow-up visit for fibromyalgia       The patient was last seen a year ago.   She tells me she was on trazodone at night which was helping her sleep but her psychiatrist retired ( as per pt) a year ago and has not been able to find a new one  She has fatigue, diffuse musculoskeletal pain.  No chest pains.  No fevers or cough.  Following CDC guidelines to avoid COVID-19 infection.  No known exposure to 18            ROS:   GEN:  No  fever, night sweats . weight is stable   + fatigue  SKIN: no rashes, no bruising, no ulcerations, no Raynaud's  HEENT: no HA's, No visual changes, no mucosal ulcers, no sicca symptoms,  CV:   no CP, SOB, PND, ZUÑIGA, no orthopnea, no palpitations  PULM: normal with no SOB, cough, hemoptysis, sputum or pleuritic pain  GI:  no abdominal pain, nausea, vomiting, constipation, diarrhea, melanotic stools, BRBPR, hematemesis, no dysphagia  :   no dysuria  NEURO: no paresthesias, headaches, visual disturbances, muscle weakness  MUSCULOSKELETAL:no joint swelling, prolonged AM stiffness, no back pain, + muscle pain  Allergies:  Review of patient's allergies indicates:   Allergen Reactions    Metronidazole Hives and Itching       Medications:    Current Outpatient Medications:     chlorhexidine (PERIDEX) 0.12 % solution, , Disp: , Rfl:     clindamycin phosphate 1% (CLINDAGEL) 1 % gel, Thin film to acne prone face QAM, Disp: 60 g, Rfl: 2    ergocalciferol (ERGOCALCIFEROL) 50,000 unit Cap, TAKE ONE CAPSULE BY MOUTH EVERY 7 DAYS, Disp: 12 capsule, Rfl: 3    ferrous sulfate (FEOSOL) 325 mg (65 mg iron) Tab tablet, TAKE 1 TABLET BY MOUTH DAILY WITH BREAKFAST, Disp: 90 tablet,  Rfl: 3    fluconazole (DIFLUCAN) 150 MG Tab, One tab PO  X 1 dose PRN yeast vaginitis, Disp: 4 tablet, Rfl: 0    gabapentin (NEURONTIN) 800 MG tablet, Take 1 tablet (800 mg total) by mouth 2 (two) times daily., Disp: 60 tablet, Rfl: 3    levothyroxine (SYNTHROID) 75 MCG tablet, TAKE 1 TABLET BY MOUTH EVERY DAY, Disp: 90 tablet, Rfl: 3    liothyronine (CYTOMEL) 5 MCG Tab, TAKE 1 TABLET BY MOUTH ONCE DAILY, Disp: 90 tablet, Rfl: 3    ONETOUCH DELICA LANCETS 33 gauge Misc, TEST EVERY DAY AS DIRECTED, Disp: 100 each, Rfl: 3    ONETOUCH VERIO Strp, USE AS DIRECTED ONCE DAILY, Disp: 100 strip, Rfl: 3    propranoloL (INDERAL LA) 80 MG 24 hr capsule, TK 1 C PO QD, Disp: , Rfl:     topiramate (TOPAMAX) 50 MG tablet, TAKE 1 TABLET BY MOUTH TWICE DAILY, Disp: 60 tablet, Rfl: 11    tretinoin (RETIN-A) 0.05 % cream, Thin film to entire face at bedtime, Disp: 45 g, Rfl: 5    cyclobenzaprine (FLEXERIL) 10 MG tablet, TAKE TO 1 TABLET BY MOUTH TWICE DAILY AS NEEDED, Disp: 60 tablet, Rfl: 3    doxycycline monohydrate 100 mg Tab, Take 1 po qday with food (Patient not taking: Reported on 10/6/2020), Disp: 30 tablet, Rfl: 2    traZODone (DESYREL) 50 MG tablet, Take 1 tablet (50 mg total) by mouth every evening., Disp: 30 tablet, Rfl: 3    PMHx/PSHx Updates:        Objective:     Vitals:  Blood pressure 107/69, temperature 97.9 °F (36.6 °C), weight 61.5 kg (135 lb 8 oz).    Physical Examination:   GEN: no apparent distress, comfortable; AAOx3  SKIN: no rashes,no ulceration, no Raynaud's, no petechiae, no SQ nodules,  HEAD: normal  EYES: no pallor, no icterus  NECK: no masses, thyroid normal, trachea midline, no LAD/LN's, supple  CV: RRR with no murmur; l S1 and S2 reg. ,no gallop no rubs,   CHEST: Normal respiratory effort; CTAB; normal breath sounds; no wheeze or crackles  MUSC/Skeletal: ROM normal; no crepitus; joints without synovitis,  no deformities  No joint swelling or tenderness of PIP, MCP, wrist, elbow, shoulder, or  knee joints.  Widespread trigger points  EXTREM: no clubbing, cyanosis, no edema,normal  pulses   NEURO: grossly intact; motor WNL; AAOx3;  PSYCH: normal mood, affect and behavior  LYMPH: normal cervical, supraclavicular          Labs:   Lab Results   Component Value Date    WBC 4.44 04/29/2020    WBC 4.44 04/29/2020    HGB 12.2 04/29/2020    HGB 12.2 04/29/2020    HCT 40.9 04/29/2020    HCT 40.9 04/29/2020    MCV 93 04/29/2020    MCV 93 04/29/2020     04/29/2020     04/29/2020    CMP  @LASTLAB(NA,K,CL,CO2,GLU,BUN,Creatinine,Calcium,PROT,Albumin,Bilitot,Alkphos,AST,ALT,CRP,ESR,RF,CCP,PEDRO,SSA,CPK,uric acid) )@  I have reviewed all available lab results and radiology reports.    Radiology/Diagnostic Studies:        Assessment/Plan:   (1) 40 y.o. female with diagnosis of fibromyalgia.  Symptomatic.  History of conversion reaction.  History of depression.  This seems to be stable.      Plan :Ok to restart Trazodone 50 mg hs  Labs     Discussion:     I have explained all of the above in detail and the patient understands all of the current recommendation(s). I have answered all questions to the best of my ability and to their complete satisfaction.       The patient is to continue with the current management plan         RTC in   A few months      Electronically signed by Keanu Bravo MD

## 2020-12-03 ENCOUNTER — PATIENT MESSAGE (OUTPATIENT)
Dept: ENDOCRINOLOGY | Facility: CLINIC | Age: 40
End: 2020-12-03

## 2021-01-22 DIAGNOSIS — I34.1 MITRAL VALVE PROLAPSE: Primary | ICD-10-CM

## 2021-02-04 ENCOUNTER — TELEPHONE (OUTPATIENT)
Dept: CARDIOLOGY | Facility: HOSPITAL | Age: 41
End: 2021-02-04

## 2021-02-05 ENCOUNTER — CLINICAL SUPPORT (OUTPATIENT)
Dept: CARDIOLOGY | Facility: HOSPITAL | Age: 41
End: 2021-02-05
Attending: SPECIALIST
Payer: MEDICAID

## 2021-02-05 ENCOUNTER — PATIENT MESSAGE (OUTPATIENT)
Dept: ENDOCRINOLOGY | Facility: CLINIC | Age: 41
End: 2021-02-05

## 2021-02-05 DIAGNOSIS — I34.1 MITRAL VALVE PROLAPSE: ICD-10-CM

## 2021-02-05 PROCEDURE — 93306 ECHO (CUPID ONLY): ICD-10-PCS | Mod: 26,,, | Performed by: INTERNAL MEDICINE

## 2021-02-05 PROCEDURE — 93306 TTE W/DOPPLER COMPLETE: CPT | Mod: 26,,, | Performed by: INTERNAL MEDICINE

## 2021-02-05 PROCEDURE — 93306 TTE W/DOPPLER COMPLETE: CPT

## 2021-02-06 LAB
AORTIC ROOT ANNULUS: 2.72 CM
AORTIC VALVE CUSP SEPERATION: 2.08 CM
AV INDEX (PROSTH): 0.95
AV MEAN GRADIENT: 2 MMHG
AV PEAK GRADIENT: 4 MMHG
AV VALVE AREA: 3.05 CM2
AV VELOCITY RATIO: 83.48
CV ECHO LV RWT: 0.4 CM
DOP CALC AO PEAK VEL: 1.06 M/S
DOP CALC AO VTI: 18.55 CM
DOP CALC LVOT AREA: 3.2 CM2
DOP CALC LVOT DIAMETER: 2.02 CM
DOP CALC LVOT PEAK VEL: 88.49 M/S
DOP CALC LVOT STROKE VOLUME: 56.63 CM3
DOP CALCLVOT PEAK VEL VTI: 17.68 CM
E WAVE DECELERATION TIME: 194.93 MSEC
E/A RATIO: 1.11
E/E' RATIO: 6 M/S
ECHO LV POSTERIOR WALL: 0.87 CM (ref 0.6–1.1)
FRACTIONAL SHORTENING: 30 % (ref 28–44)
INTERVENTRICULAR SEPTUM: 0.87 CM (ref 0.6–1.1)
LEFT ATRIUM SIZE: 2.66 CM
LEFT INTERNAL DIMENSION IN SYSTOLE: 3 CM (ref 2.1–4)
LEFT VENTRICULAR INTERNAL DIMENSION IN DIASTOLE: 4.3 CM (ref 3.5–6)
LEFT VENTRICULAR MASS: 117.78 G
LV LATERAL E/E' RATIO: 5.31 M/S
LV SEPTAL E/E' RATIO: 6.9 M/S
MV PEAK A VEL: 0.62 M/S
MV PEAK E VEL: 0.69 M/S
PISA TR MAX VEL: 2.1 M/S
PV PEAK VELOCITY: 69.26 CM/S
RA PRESSURE: 3 MMHG
TDI LATERAL: 0.13 M/S
TDI SEPTAL: 0.1 M/S
TDI: 0.12 M/S
TR MAX PG: 18 MMHG
TV REST PULMONARY ARTERY PRESSURE: 21 MMHG

## 2021-02-09 ENCOUNTER — OFFICE VISIT (OUTPATIENT)
Dept: RHEUMATOLOGY | Facility: CLINIC | Age: 41
End: 2021-02-09
Payer: MEDICAID

## 2021-02-09 VITALS
BODY MASS INDEX: 24.89 KG/M2 | TEMPERATURE: 98 F | WEIGHT: 147.31 LBS | DIASTOLIC BLOOD PRESSURE: 74 MMHG | SYSTOLIC BLOOD PRESSURE: 120 MMHG

## 2021-02-09 DIAGNOSIS — M79.7 FIBROMYALGIA: Primary | ICD-10-CM

## 2021-02-09 PROCEDURE — 99213 OFFICE O/P EST LOW 20 MIN: CPT | Mod: S$GLB,,, | Performed by: INTERNAL MEDICINE

## 2021-02-09 PROCEDURE — 99213 PR OFFICE/OUTPT VISIT, EST, LEVL III, 20-29 MIN: ICD-10-PCS | Mod: S$GLB,,, | Performed by: INTERNAL MEDICINE

## 2021-02-09 RX ORDER — CYCLOBENZAPRINE HCL 10 MG
TABLET ORAL
Qty: 60 TABLET | Refills: 3 | Status: SHIPPED | OUTPATIENT
Start: 2021-02-09 | End: 2021-12-15 | Stop reason: SDUPTHER

## 2021-02-09 RX ORDER — TRAZODONE HYDROCHLORIDE 50 MG/1
50 TABLET ORAL NIGHTLY
Qty: 30 TABLET | Refills: 3 | Status: SHIPPED | OUTPATIENT
Start: 2021-02-09 | End: 2021-06-21

## 2021-02-15 ENCOUNTER — OFFICE VISIT (OUTPATIENT)
Dept: ENDOCRINOLOGY | Facility: CLINIC | Age: 41
End: 2021-02-15
Payer: MEDICAID

## 2021-02-15 DIAGNOSIS — K21.9 GERD WITHOUT ESOPHAGITIS: ICD-10-CM

## 2021-02-15 DIAGNOSIS — E78.00 HYPERCHOLESTEROLEMIA: ICD-10-CM

## 2021-02-15 DIAGNOSIS — M79.7 FIBROMYALGIA: ICD-10-CM

## 2021-02-15 DIAGNOSIS — F90.0 ATTENTION DEFICIT HYPERACTIVITY DISORDER (ADHD), PREDOMINANTLY INATTENTIVE TYPE: ICD-10-CM

## 2021-02-15 DIAGNOSIS — E07.9 THYROID DISEASE: ICD-10-CM

## 2021-02-15 DIAGNOSIS — E06.9 THYROIDITIS: ICD-10-CM

## 2021-02-15 DIAGNOSIS — F44.5 PSEUDOSEIZURES: ICD-10-CM

## 2021-02-15 DIAGNOSIS — F41.9 ANXIETY: ICD-10-CM

## 2021-02-15 DIAGNOSIS — I10 ESSENTIAL HYPERTENSION: ICD-10-CM

## 2021-02-15 DIAGNOSIS — E03.9 HYPOTHYROIDISM (ACQUIRED): Primary | ICD-10-CM

## 2021-02-15 DIAGNOSIS — E55.9 HYPOVITAMINOSIS D: ICD-10-CM

## 2021-02-15 DIAGNOSIS — R94.6 ABNORMAL THYROID FUNCTION TEST: ICD-10-CM

## 2021-02-15 DIAGNOSIS — F32.89 OTHER DEPRESSION: ICD-10-CM

## 2021-02-15 DIAGNOSIS — G43.009 MIGRAINE WITHOUT AURA AND WITHOUT STATUS MIGRAINOSUS, NOT INTRACTABLE: ICD-10-CM

## 2021-02-15 DIAGNOSIS — L70.0 ACNE VULGARIS: ICD-10-CM

## 2021-02-15 DIAGNOSIS — E04.9 GOITER: ICD-10-CM

## 2021-02-15 DIAGNOSIS — M51.36 DDD (DEGENERATIVE DISC DISEASE), LUMBAR: ICD-10-CM

## 2021-02-15 DIAGNOSIS — Z90.710 S/P HYSTERECTOMY: ICD-10-CM

## 2021-02-15 DIAGNOSIS — E78.5 HYPERLIPIDEMIA, UNSPECIFIED HYPERLIPIDEMIA TYPE: ICD-10-CM

## 2021-02-15 DIAGNOSIS — I34.1 MITRAL VALVE PROLAPSE: ICD-10-CM

## 2021-02-15 DIAGNOSIS — E11.65 TYPE 2 DIABETES MELLITUS WITH HYPERGLYCEMIA, WITHOUT LONG-TERM CURRENT USE OF INSULIN: ICD-10-CM

## 2021-02-15 PROCEDURE — 99214 OFFICE O/P EST MOD 30 MIN: CPT | Mod: 95,,, | Performed by: INTERNAL MEDICINE

## 2021-02-15 PROCEDURE — 99214 PR OFFICE/OUTPT VISIT, EST, LEVL IV, 30-39 MIN: ICD-10-PCS | Mod: 95,,, | Performed by: INTERNAL MEDICINE

## 2021-02-17 ENCOUNTER — HOSPITAL ENCOUNTER (EMERGENCY)
Facility: HOSPITAL | Age: 41
Discharge: HOME | End: 2021-02-17
Attending: EMERGENCY MEDICINE
Payer: MEDICAID

## 2021-02-17 ENCOUNTER — PATIENT MESSAGE (OUTPATIENT)
Dept: OBSTETRICS AND GYNECOLOGY | Facility: CLINIC | Age: 41
End: 2021-02-17

## 2021-02-17 ENCOUNTER — CLINICAL SUPPORT (OUTPATIENT)
Dept: OBSTETRICS AND GYNECOLOGY | Facility: CLINIC | Age: 41
End: 2021-02-17
Payer: MEDICAID

## 2021-02-17 VITALS
SYSTOLIC BLOOD PRESSURE: 110 MMHG | RESPIRATION RATE: 16 BRPM | OXYGEN SATURATION: 98 % | TEMPERATURE: 98 F | HEIGHT: 64 IN | HEART RATE: 81 BPM | DIASTOLIC BLOOD PRESSURE: 67 MMHG | BODY MASS INDEX: 24.65 KG/M2 | WEIGHT: 144.38 LBS

## 2021-02-17 DIAGNOSIS — N39.0 URINARY TRACT INFECTION WITH HEMATURIA, SITE UNSPECIFIED: Primary | ICD-10-CM

## 2021-02-17 DIAGNOSIS — R35.0 URINARY FREQUENCY: Primary | ICD-10-CM

## 2021-02-17 DIAGNOSIS — R31.9 URINARY TRACT INFECTION WITH HEMATURIA, SITE UNSPECIFIED: Primary | ICD-10-CM

## 2021-02-17 DIAGNOSIS — R30.9 PAINFUL URINATION: ICD-10-CM

## 2021-02-17 DIAGNOSIS — R31.9 HEMATURIA, UNSPECIFIED TYPE: ICD-10-CM

## 2021-02-17 LAB
ALBUMIN SERPL BCP-MCNC: 3.7 G/DL (ref 3.5–5.2)
ALP SERPL-CCNC: 47 U/L (ref 55–135)
ALT SERPL W/O P-5'-P-CCNC: 14 U/L (ref 10–44)
ANION GAP SERPL CALC-SCNC: 6 MMOL/L (ref 8–16)
AST SERPL-CCNC: 14 U/L (ref 10–40)
BACTERIA #/AREA URNS HPF: ABNORMAL /HPF
BASOPHILS # BLD AUTO: 0.02 K/UL (ref 0–0.2)
BASOPHILS NFR BLD: 0.3 % (ref 0–1.9)
BILIRUB SERPL-MCNC: 0.4 MG/DL (ref 0.1–1)
BILIRUB UR QL STRIP: NEGATIVE
BUN SERPL-MCNC: 7 MG/DL (ref 6–20)
CALCIUM SERPL-MCNC: 8.5 MG/DL (ref 8.7–10.5)
CHLORIDE SERPL-SCNC: 107 MMOL/L (ref 95–110)
CLARITY UR: CLEAR
CO2 SERPL-SCNC: 24 MMOL/L (ref 23–29)
COLOR UR: YELLOW
CREAT SERPL-MCNC: 0.8 MG/DL (ref 0.5–1.4)
DIFFERENTIAL METHOD: ABNORMAL
EOSINOPHIL # BLD AUTO: 0 K/UL (ref 0–0.5)
EOSINOPHIL NFR BLD: 0.6 % (ref 0–8)
ERYTHROCYTE [DISTWIDTH] IN BLOOD BY AUTOMATED COUNT: 12.7 % (ref 11.5–14.5)
EST. GFR  (AFRICAN AMERICAN): >60 ML/MIN/1.73 M^2
EST. GFR  (NON AFRICAN AMERICAN): >60 ML/MIN/1.73 M^2
GLUCOSE SERPL-MCNC: 88 MG/DL (ref 70–110)
GLUCOSE UR QL STRIP: NEGATIVE
HCT VFR BLD AUTO: 38 % (ref 37–48.5)
HGB BLD-MCNC: 12 G/DL (ref 12–16)
HGB UR QL STRIP: ABNORMAL
IMM GRANULOCYTES # BLD AUTO: 0.04 K/UL (ref 0–0.04)
IMM GRANULOCYTES NFR BLD AUTO: 0.6 % (ref 0–0.5)
KETONES UR QL STRIP: NEGATIVE
LEUKOCYTE ESTERASE UR QL STRIP: ABNORMAL
LIPASE SERPL-CCNC: 14 U/L (ref 4–60)
LYMPHOCYTES # BLD AUTO: 1.8 K/UL (ref 1–4.8)
LYMPHOCYTES NFR BLD: 25.7 % (ref 18–48)
MCH RBC QN AUTO: 28.8 PG (ref 27–31)
MCHC RBC AUTO-ENTMCNC: 31.6 G/DL (ref 32–36)
MCV RBC AUTO: 91 FL (ref 82–98)
MICROSCOPIC COMMENT: ABNORMAL
MONOCYTES # BLD AUTO: 0.5 K/UL (ref 0.3–1)
MONOCYTES NFR BLD: 7.5 % (ref 4–15)
NEUTROPHILS # BLD AUTO: 4.6 K/UL (ref 1.8–7.7)
NEUTROPHILS NFR BLD: 65.3 % (ref 38–73)
NITRITE UR QL STRIP: NEGATIVE
NRBC BLD-RTO: 0 /100 WBC
PH UR STRIP: 6 [PH] (ref 5–8)
PLATELET # BLD AUTO: 236 K/UL (ref 150–350)
PMV BLD AUTO: 10.2 FL (ref 9.2–12.9)
POTASSIUM SERPL-SCNC: 3.9 MMOL/L (ref 3.5–5.1)
PROT SERPL-MCNC: 6.8 G/DL (ref 6–8.4)
PROT UR QL STRIP: NEGATIVE
RBC # BLD AUTO: 4.17 M/UL (ref 4–5.4)
RBC #/AREA URNS HPF: 0 /HPF (ref 0–4)
SODIUM SERPL-SCNC: 137 MMOL/L (ref 136–145)
SP GR UR STRIP: <=1.005 (ref 1–1.03)
SQUAMOUS #/AREA URNS HPF: 4 /HPF
URN SPEC COLLECT METH UR: ABNORMAL
UROBILINOGEN UR STRIP-ACNC: NEGATIVE EU/DL
WBC # BLD AUTO: 6.96 K/UL (ref 3.9–12.7)
WBC #/AREA URNS HPF: 12 /HPF (ref 0–5)

## 2021-02-17 PROCEDURE — 63600175 PHARM REV CODE 636 W HCPCS: Performed by: EMERGENCY MEDICINE

## 2021-02-17 PROCEDURE — 36415 COLL VENOUS BLD VENIPUNCTURE: CPT

## 2021-02-17 PROCEDURE — 87147 CULTURE TYPE IMMUNOLOGIC: CPT | Mod: 59

## 2021-02-17 PROCEDURE — 87147 CULTURE TYPE IMMUNOLOGIC: CPT

## 2021-02-17 PROCEDURE — 99284 EMERGENCY DEPT VISIT MOD MDM: CPT | Mod: 25

## 2021-02-17 PROCEDURE — 87086 URINE CULTURE/COLONY COUNT: CPT | Mod: 59

## 2021-02-17 PROCEDURE — 87086 URINE CULTURE/COLONY COUNT: CPT

## 2021-02-17 PROCEDURE — 83690 ASSAY OF LIPASE: CPT

## 2021-02-17 PROCEDURE — 87088 URINE BACTERIA CULTURE: CPT | Mod: 59

## 2021-02-17 PROCEDURE — 85025 COMPLETE CBC W/AUTO DIFF WBC: CPT

## 2021-02-17 PROCEDURE — 25000003 PHARM REV CODE 250: Performed by: EMERGENCY MEDICINE

## 2021-02-17 PROCEDURE — 87088 URINE BACTERIA CULTURE: CPT

## 2021-02-17 PROCEDURE — 81000 URINALYSIS NONAUTO W/SCOPE: CPT

## 2021-02-17 PROCEDURE — 80053 COMPREHEN METABOLIC PANEL: CPT

## 2021-02-17 PROCEDURE — 96372 THER/PROPH/DIAG INJ SC/IM: CPT

## 2021-02-17 RX ORDER — AMOXICILLIN AND CLAVULANATE POTASSIUM 875; 125 MG/1; MG/1
1 TABLET, FILM COATED ORAL 2 TIMES DAILY
Qty: 20 TABLET | Refills: 0 | Status: SHIPPED | OUTPATIENT
Start: 2021-02-17 | End: 2021-02-27

## 2021-02-17 RX ORDER — ACETAMINOPHEN 325 MG/1
650 TABLET ORAL
Status: COMPLETED | OUTPATIENT
Start: 2021-02-17 | End: 2021-02-17

## 2021-02-17 RX ORDER — KETOROLAC TROMETHAMINE 30 MG/ML
15 INJECTION, SOLUTION INTRAMUSCULAR; INTRAVENOUS
Status: COMPLETED | OUTPATIENT
Start: 2021-02-17 | End: 2021-02-17

## 2021-02-17 RX ORDER — FLUCONAZOLE 150 MG/1
150 TABLET ORAL DAILY
Qty: 1 TABLET | Refills: 0 | Status: SHIPPED | OUTPATIENT
Start: 2021-02-17 | End: 2021-02-18

## 2021-02-17 RX ADMIN — KETOROLAC TROMETHAMINE 15 MG: 30 INJECTION, SOLUTION INTRAMUSCULAR at 05:02

## 2021-02-17 RX ADMIN — ACETAMINOPHEN 650 MG: 325 TABLET ORAL at 05:02

## 2021-02-18 ENCOUNTER — TELEPHONE (OUTPATIENT)
Dept: OBSTETRICS AND GYNECOLOGY | Facility: CLINIC | Age: 41
End: 2021-02-18

## 2021-02-18 ENCOUNTER — PES CALL (OUTPATIENT)
Dept: ADMINISTRATIVE | Facility: CLINIC | Age: 41
End: 2021-02-18

## 2021-02-18 ENCOUNTER — TELEPHONE (OUTPATIENT)
Dept: UROLOGY | Facility: CLINIC | Age: 41
End: 2021-02-18

## 2021-02-18 LAB
BACTERIA UR CULT: ABNORMAL
BACTERIA UR CULT: ABNORMAL

## 2021-02-23 ENCOUNTER — TELEPHONE (OUTPATIENT)
Dept: OBSTETRICS AND GYNECOLOGY | Facility: CLINIC | Age: 41
End: 2021-02-23

## 2021-02-24 ENCOUNTER — OFFICE VISIT (OUTPATIENT)
Dept: UROLOGY | Facility: CLINIC | Age: 41
End: 2021-02-24
Payer: MEDICAID

## 2021-02-24 VITALS
WEIGHT: 144.38 LBS | RESPIRATION RATE: 18 BRPM | HEART RATE: 80 BPM | HEIGHT: 64 IN | SYSTOLIC BLOOD PRESSURE: 115 MMHG | DIASTOLIC BLOOD PRESSURE: 69 MMHG | BODY MASS INDEX: 24.65 KG/M2

## 2021-02-24 DIAGNOSIS — E07.9 THYROID DISEASE: ICD-10-CM

## 2021-02-24 DIAGNOSIS — K21.9 GERD WITHOUT ESOPHAGITIS: ICD-10-CM

## 2021-02-24 DIAGNOSIS — E78.5 HYPERLIPIDEMIA, UNSPECIFIED HYPERLIPIDEMIA TYPE: ICD-10-CM

## 2021-02-24 DIAGNOSIS — E11.65 TYPE 2 DIABETES MELLITUS WITH HYPERGLYCEMIA, WITHOUT LONG-TERM CURRENT USE OF INSULIN: ICD-10-CM

## 2021-02-24 DIAGNOSIS — I10 ESSENTIAL HYPERTENSION: ICD-10-CM

## 2021-02-24 DIAGNOSIS — R31.9 HEMATURIA OF UNKNOWN CAUSE: Primary | ICD-10-CM

## 2021-02-24 DIAGNOSIS — M79.7 FIBROMYALGIA: ICD-10-CM

## 2021-02-24 DIAGNOSIS — I34.1 MITRAL VALVE PROLAPSE: ICD-10-CM

## 2021-02-24 PROCEDURE — 88112 CYTOPATH CELL ENHANCE TECH: CPT | Mod: 26,,, | Performed by: PATHOLOGY

## 2021-02-24 PROCEDURE — 99999 PR PBB SHADOW E&M-EST. PATIENT-LVL III: CPT | Mod: PBBFAC,,, | Performed by: STUDENT IN AN ORGANIZED HEALTH CARE EDUCATION/TRAINING PROGRAM

## 2021-02-24 PROCEDURE — 99204 PR OFFICE/OUTPT VISIT, NEW, LEVL IV, 45-59 MIN: ICD-10-PCS | Mod: S$PBB,,, | Performed by: STUDENT IN AN ORGANIZED HEALTH CARE EDUCATION/TRAINING PROGRAM

## 2021-02-24 PROCEDURE — 99204 OFFICE O/P NEW MOD 45 MIN: CPT | Mod: S$PBB,,, | Performed by: STUDENT IN AN ORGANIZED HEALTH CARE EDUCATION/TRAINING PROGRAM

## 2021-02-24 PROCEDURE — 88112 PR  CYTOPATH, CELL ENHANCE TECH: ICD-10-PCS | Mod: 26,,, | Performed by: PATHOLOGY

## 2021-02-24 PROCEDURE — 99999 PR PBB SHADOW E&M-EST. PATIENT-LVL III: ICD-10-PCS | Mod: PBBFAC,,, | Performed by: STUDENT IN AN ORGANIZED HEALTH CARE EDUCATION/TRAINING PROGRAM

## 2021-02-24 PROCEDURE — 99213 OFFICE O/P EST LOW 20 MIN: CPT | Mod: PBBFAC,PN | Performed by: STUDENT IN AN ORGANIZED HEALTH CARE EDUCATION/TRAINING PROGRAM

## 2021-02-24 PROCEDURE — 88112 CYTOPATH CELL ENHANCE TECH: CPT | Performed by: PATHOLOGY

## 2021-02-24 RX ORDER — TAMSULOSIN HYDROCHLORIDE 0.4 MG/1
0.4 CAPSULE ORAL DAILY
Qty: 30 CAPSULE | Refills: 0 | Status: SHIPPED | OUTPATIENT
Start: 2021-02-24 | End: 2021-07-13

## 2021-02-26 LAB — FINAL PATHOLOGIC DIAGNOSIS: NORMAL

## 2021-03-01 ENCOUNTER — HOSPITAL ENCOUNTER (OUTPATIENT)
Dept: RADIOLOGY | Facility: HOSPITAL | Age: 41
Discharge: HOME | End: 2021-03-01
Attending: STUDENT IN AN ORGANIZED HEALTH CARE EDUCATION/TRAINING PROGRAM
Payer: MEDICAID

## 2021-03-01 DIAGNOSIS — R31.9 HEMATURIA OF UNKNOWN CAUSE: ICD-10-CM

## 2021-03-01 DIAGNOSIS — R31.9 HEMATURIA OF UNKNOWN CAUSE: Primary | ICD-10-CM

## 2021-03-01 PROCEDURE — 74178 CT ABD&PLV WO CNTR FLWD CNTR: CPT | Mod: 26,,, | Performed by: RADIOLOGY

## 2021-03-01 PROCEDURE — 74178 CT ABD&PLV WO CNTR FLWD CNTR: CPT | Mod: TC

## 2021-03-01 PROCEDURE — 74178 CT UROGRAM ABD PELVIS W WO: ICD-10-PCS | Mod: 26,,, | Performed by: RADIOLOGY

## 2021-03-01 PROCEDURE — 25500020 PHARM REV CODE 255

## 2021-03-01 RX ADMIN — IOHEXOL 125 ML: 350 INJECTION, SOLUTION INTRAVENOUS at 08:03

## 2021-03-02 ENCOUNTER — PATIENT MESSAGE (OUTPATIENT)
Dept: SURGERY | Facility: AMBULARY SURGERY CENTER | Age: 41
End: 2021-03-02

## 2021-03-07 ENCOUNTER — LAB VISIT (OUTPATIENT)
Dept: PRIMARY CARE CLINIC | Facility: CLINIC | Age: 41
End: 2021-03-07
Payer: MEDICAID

## 2021-03-07 DIAGNOSIS — R31.0 GROSS HEMATURIA: ICD-10-CM

## 2021-03-07 LAB — SARS-COV-2 RNA RESP QL NAA+PROBE: NOT DETECTED

## 2021-03-07 PROCEDURE — U0005 INFEC AGEN DETEC AMPLI PROBE: HCPCS | Performed by: STUDENT IN AN ORGANIZED HEALTH CARE EDUCATION/TRAINING PROGRAM

## 2021-03-07 PROCEDURE — U0003 INFECTIOUS AGENT DETECTION BY NUCLEIC ACID (DNA OR RNA); SEVERE ACUTE RESPIRATORY SYNDROME CORONAVIRUS 2 (SARS-COV-2) (CORONAVIRUS DISEASE [COVID-19]), AMPLIFIED PROBE TECHNIQUE, MAKING USE OF HIGH THROUGHPUT TECHNOLOGIES AS DESCRIBED BY CMS-2020-01-R: HCPCS | Performed by: STUDENT IN AN ORGANIZED HEALTH CARE EDUCATION/TRAINING PROGRAM

## 2021-03-10 ENCOUNTER — HOSPITAL ENCOUNTER (OUTPATIENT)
Facility: AMBULARY SURGERY CENTER | Age: 41
Discharge: HOME | End: 2021-03-10
Attending: STUDENT IN AN ORGANIZED HEALTH CARE EDUCATION/TRAINING PROGRAM | Admitting: STUDENT IN AN ORGANIZED HEALTH CARE EDUCATION/TRAINING PROGRAM
Payer: MEDICAID

## 2021-03-10 ENCOUNTER — TELEPHONE (OUTPATIENT)
Dept: UROLOGY | Facility: CLINIC | Age: 41
End: 2021-03-10

## 2021-03-10 DIAGNOSIS — R31.0 GROSS HEMATURIA: Primary | ICD-10-CM

## 2021-03-10 PROCEDURE — 52000 CYSTOURETHROSCOPY: CPT | Performed by: STUDENT IN AN ORGANIZED HEALTH CARE EDUCATION/TRAINING PROGRAM

## 2021-03-10 PROCEDURE — 52000 PR CYSTOURETHROSCOPY: ICD-10-PCS | Mod: ,,, | Performed by: STUDENT IN AN ORGANIZED HEALTH CARE EDUCATION/TRAINING PROGRAM

## 2021-03-10 PROCEDURE — 52000 CYSTOURETHROSCOPY: CPT | Mod: ,,, | Performed by: STUDENT IN AN ORGANIZED HEALTH CARE EDUCATION/TRAINING PROGRAM

## 2021-03-10 RX ORDER — SULFAMETHOXAZOLE AND TRIMETHOPRIM 800; 160 MG/1; MG/1
1 TABLET ORAL 2 TIMES DAILY
Qty: 2 TABLET | Refills: 0 | Status: SHIPPED | OUTPATIENT
Start: 2021-03-10 | End: 2021-03-11

## 2021-03-10 RX ORDER — FLUCONAZOLE 150 MG/1
150 TABLET ORAL DAILY
Qty: 1 TABLET | Refills: 0 | Status: SHIPPED | OUTPATIENT
Start: 2021-03-10 | End: 2021-03-11

## 2021-03-10 RX ORDER — WATER 1000 ML/1000ML
INJECTION, SOLUTION INTRAVENOUS
Status: DISCONTINUED | OUTPATIENT
Start: 2021-03-10 | End: 2021-03-10 | Stop reason: HOSPADM

## 2021-03-10 RX ORDER — LIDOCAINE HYDROCHLORIDE 20 MG/ML
JELLY TOPICAL
Status: DISCONTINUED | OUTPATIENT
Start: 2021-03-10 | End: 2021-03-10 | Stop reason: HOSPADM

## 2021-03-15 VITALS
HEIGHT: 64 IN | SYSTOLIC BLOOD PRESSURE: 116 MMHG | TEMPERATURE: 98 F | DIASTOLIC BLOOD PRESSURE: 78 MMHG | WEIGHT: 144 LBS | OXYGEN SATURATION: 100 % | RESPIRATION RATE: 20 BRPM | BODY MASS INDEX: 24.59 KG/M2 | HEART RATE: 82 BPM

## 2021-03-19 ENCOUNTER — IMMUNIZATION (OUTPATIENT)
Dept: PHARMACY | Facility: CLINIC | Age: 41
End: 2021-03-19
Payer: MEDICAID

## 2021-03-19 DIAGNOSIS — Z23 NEED FOR VACCINATION: Primary | ICD-10-CM

## 2021-03-23 ENCOUNTER — PATIENT MESSAGE (OUTPATIENT)
Dept: ADMINISTRATIVE | Facility: CLINIC | Age: 41
End: 2021-03-23

## 2021-04-16 ENCOUNTER — IMMUNIZATION (OUTPATIENT)
Dept: PHARMACY | Facility: CLINIC | Age: 41
End: 2021-04-16
Payer: MEDICAID

## 2021-04-16 DIAGNOSIS — Z23 NEED FOR VACCINATION: Primary | ICD-10-CM

## 2021-07-06 ENCOUNTER — OFFICE VISIT (OUTPATIENT)
Dept: URGENT CARE | Facility: CLINIC | Age: 41
End: 2021-07-06
Payer: MEDICAID

## 2021-07-06 VITALS
DIASTOLIC BLOOD PRESSURE: 88 MMHG | SYSTOLIC BLOOD PRESSURE: 121 MMHG | RESPIRATION RATE: 12 BRPM | TEMPERATURE: 98 F | HEART RATE: 96 BPM | WEIGHT: 150 LBS | BODY MASS INDEX: 25.61 KG/M2 | OXYGEN SATURATION: 98 % | HEIGHT: 64 IN

## 2021-07-06 DIAGNOSIS — Z71.1 WORRIED WELL: Primary | ICD-10-CM

## 2021-07-06 PROCEDURE — 99499 UNLISTED E&M SERVICE: CPT | Mod: S$GLB,,, | Performed by: NURSE PRACTITIONER

## 2021-07-06 PROCEDURE — 99499 NO LOS: ICD-10-PCS | Mod: S$GLB,,, | Performed by: NURSE PRACTITIONER

## 2021-07-13 ENCOUNTER — OFFICE VISIT (OUTPATIENT)
Dept: RHEUMATOLOGY | Facility: CLINIC | Age: 41
End: 2021-07-13
Payer: MEDICAID

## 2021-07-13 VITALS
DIASTOLIC BLOOD PRESSURE: 85 MMHG | SYSTOLIC BLOOD PRESSURE: 122 MMHG | WEIGHT: 147.31 LBS | BODY MASS INDEX: 25.28 KG/M2

## 2021-07-13 DIAGNOSIS — M79.7 FIBROMYALGIA: Primary | ICD-10-CM

## 2021-07-13 PROCEDURE — 99213 OFFICE O/P EST LOW 20 MIN: CPT | Mod: S$GLB,,, | Performed by: INTERNAL MEDICINE

## 2021-07-13 PROCEDURE — 99213 PR OFFICE/OUTPT VISIT, EST, LEVL III, 20-29 MIN: ICD-10-PCS | Mod: S$GLB,,, | Performed by: INTERNAL MEDICINE

## 2021-08-05 DIAGNOSIS — Z12.31 VISIT FOR SCREENING MAMMOGRAM: Primary | ICD-10-CM

## 2021-08-06 ENCOUNTER — OFFICE VISIT (OUTPATIENT)
Dept: OBSTETRICS AND GYNECOLOGY | Facility: CLINIC | Age: 41
End: 2021-08-06
Payer: MEDICAID

## 2021-08-06 VITALS
DIASTOLIC BLOOD PRESSURE: 68 MMHG | RESPIRATION RATE: 14 BRPM | SYSTOLIC BLOOD PRESSURE: 112 MMHG | HEIGHT: 64 IN | BODY MASS INDEX: 25.45 KG/M2 | WEIGHT: 149.06 LBS

## 2021-08-06 DIAGNOSIS — N89.8 VAGINAL DISCHARGE: ICD-10-CM

## 2021-08-06 DIAGNOSIS — Z01.419 ENCOUNTER FOR ROUTINE GYNECOLOGICAL EXAMINATION WITH PAPANICOLAOU SMEAR OF CERVIX: Primary | ICD-10-CM

## 2021-08-06 DIAGNOSIS — Z20.2 POSSIBLE EXPOSURE TO STD: ICD-10-CM

## 2021-08-06 PROCEDURE — 99999 PR PBB SHADOW E&M-EST. PATIENT-LVL IV: CPT | Mod: PBBFAC,,, | Performed by: SPECIALIST

## 2021-08-06 PROCEDURE — 87481 CANDIDA DNA AMP PROBE: CPT | Mod: 59 | Performed by: SPECIALIST

## 2021-08-06 PROCEDURE — 99396 PREV VISIT EST AGE 40-64: CPT | Mod: S$PBB,,, | Performed by: SPECIALIST

## 2021-08-06 PROCEDURE — 99214 OFFICE O/P EST MOD 30 MIN: CPT | Mod: PBBFAC,PN | Performed by: SPECIALIST

## 2021-08-06 PROCEDURE — 87661 TRICHOMONAS VAGINALIS AMPLIF: CPT | Mod: 59 | Performed by: SPECIALIST

## 2021-08-06 PROCEDURE — 87591 N.GONORRHOEAE DNA AMP PROB: CPT | Mod: 59 | Performed by: SPECIALIST

## 2021-08-06 PROCEDURE — 99999 PR PBB SHADOW E&M-EST. PATIENT-LVL IV: ICD-10-PCS | Mod: PBBFAC,,, | Performed by: SPECIALIST

## 2021-08-06 PROCEDURE — 99396 PR PREVENTIVE VISIT,EST,40-64: ICD-10-PCS | Mod: S$PBB,,, | Performed by: SPECIALIST

## 2021-08-06 PROCEDURE — 87491 CHLMYD TRACH DNA AMP PROBE: CPT | Performed by: SPECIALIST

## 2021-08-06 RX ORDER — FLUCONAZOLE 200 MG/1
200 TABLET ORAL ONCE
Qty: 1 TABLET | Refills: 0 | Status: SHIPPED | OUTPATIENT
Start: 2021-08-06 | End: 2021-08-06

## 2021-08-06 RX ORDER — METRONIDAZOLE 250 MG/1
250 TABLET ORAL 3 TIMES DAILY
Qty: 21 TABLET | Refills: 0 | Status: SHIPPED | OUTPATIENT
Start: 2021-08-06 | End: 2021-08-13

## 2021-08-09 LAB
BACTERIAL VAGINOSIS DNA: POSITIVE
C TRACH DNA SPEC QL NAA+PROBE: NOT DETECTED
CANDIDA GLABRATA DNA: NEGATIVE
CANDIDA KRUSEI DNA: NEGATIVE
CANDIDA RRNA VAG QL PROBE: NEGATIVE
N GONORRHOEA DNA SPEC QL NAA+PROBE: NOT DETECTED
T VAGINALIS RRNA GENITAL QL PROBE: NEGATIVE

## 2021-08-17 ENCOUNTER — LAB VISIT (OUTPATIENT)
Dept: LAB | Facility: HOSPITAL | Age: 41
End: 2021-08-17
Attending: INTERNAL MEDICINE
Payer: MEDICAID

## 2021-08-17 ENCOUNTER — OFFICE VISIT (OUTPATIENT)
Dept: DERMATOLOGY | Facility: CLINIC | Age: 41
End: 2021-08-17
Payer: MEDICAID

## 2021-08-17 VITALS — HEIGHT: 64 IN | WEIGHT: 149 LBS | BODY MASS INDEX: 25.44 KG/M2

## 2021-08-17 DIAGNOSIS — L70.0 CYSTIC ACNE: ICD-10-CM

## 2021-08-17 DIAGNOSIS — L70.0 CYSTIC ACNE: Primary | ICD-10-CM

## 2021-08-17 DIAGNOSIS — Z20.2 POSSIBLE EXPOSURE TO STD: ICD-10-CM

## 2021-08-17 DIAGNOSIS — L70.8 DEMODEX ACNE: ICD-10-CM

## 2021-08-17 DIAGNOSIS — L30.9 DERMATITIS: ICD-10-CM

## 2021-08-17 DIAGNOSIS — M79.7 FIBROMYALGIA: ICD-10-CM

## 2021-08-17 LAB
ALBUMIN SERPL BCP-MCNC: 3.8 G/DL (ref 3.5–5.2)
ALP SERPL-CCNC: 43 U/L (ref 55–135)
ALT SERPL W/O P-5'-P-CCNC: 38 U/L (ref 10–44)
ANION GAP SERPL CALC-SCNC: 10 MMOL/L (ref 8–16)
AST SERPL-CCNC: 33 U/L (ref 10–40)
BASOPHILS # BLD AUTO: 0.01 K/UL (ref 0–0.2)
BASOPHILS NFR BLD: 0.2 % (ref 0–1.9)
BILIRUB SERPL-MCNC: 0.4 MG/DL (ref 0.1–1)
BUN SERPL-MCNC: 11 MG/DL (ref 6–20)
CALCIUM SERPL-MCNC: 8.7 MG/DL (ref 8.7–10.5)
CHLORIDE SERPL-SCNC: 107 MMOL/L (ref 95–110)
CHOLEST SERPL-MCNC: 187 MG/DL (ref 120–199)
CHOLEST/HDLC SERPL: 3.5 {RATIO} (ref 2–5)
CO2 SERPL-SCNC: 21 MMOL/L (ref 23–29)
CREAT SERPL-MCNC: 0.8 MG/DL (ref 0.5–1.4)
DIFFERENTIAL METHOD: ABNORMAL
EOSINOPHIL # BLD AUTO: 0.1 K/UL (ref 0–0.5)
EOSINOPHIL NFR BLD: 1 % (ref 0–8)
ERYTHROCYTE [DISTWIDTH] IN BLOOD BY AUTOMATED COUNT: 13 % (ref 11.5–14.5)
ERYTHROCYTE [SEDIMENTATION RATE] IN BLOOD BY WESTERGREN METHOD: 15 MM/HR (ref 0–20)
EST. GFR  (AFRICAN AMERICAN): >60 ML/MIN/1.73 M^2
EST. GFR  (NON AFRICAN AMERICAN): >60 ML/MIN/1.73 M^2
GLUCOSE SERPL-MCNC: 93 MG/DL (ref 70–110)
HCT VFR BLD AUTO: 38.9 % (ref 37–48.5)
HDLC SERPL-MCNC: 53 MG/DL (ref 40–75)
HDLC SERPL: 28.3 % (ref 20–50)
HGB BLD-MCNC: 12.3 G/DL (ref 12–16)
IMM GRANULOCYTES # BLD AUTO: 0.05 K/UL (ref 0–0.04)
IMM GRANULOCYTES NFR BLD AUTO: 0.8 % (ref 0–0.5)
LDLC SERPL CALC-MCNC: 117.4 MG/DL (ref 63–159)
LYMPHOCYTES # BLD AUTO: 1.7 K/UL (ref 1–4.8)
LYMPHOCYTES NFR BLD: 28.7 % (ref 18–48)
MCH RBC QN AUTO: 28.7 PG (ref 27–31)
MCHC RBC AUTO-ENTMCNC: 31.6 G/DL (ref 32–36)
MCV RBC AUTO: 91 FL (ref 82–98)
MONOCYTES # BLD AUTO: 0.4 K/UL (ref 0.3–1)
MONOCYTES NFR BLD: 6.9 % (ref 4–15)
NEUTROPHILS # BLD AUTO: 3.7 K/UL (ref 1.8–7.7)
NEUTROPHILS NFR BLD: 62.4 % (ref 38–73)
NONHDLC SERPL-MCNC: 134 MG/DL
NRBC BLD-RTO: 0 /100 WBC
PLATELET # BLD AUTO: 256 K/UL (ref 150–450)
PMV BLD AUTO: 10.6 FL (ref 9.2–12.9)
POTASSIUM SERPL-SCNC: 3.7 MMOL/L (ref 3.5–5.1)
PROT SERPL-MCNC: 7.1 G/DL (ref 6–8.4)
RBC # BLD AUTO: 4.29 M/UL (ref 4–5.4)
SODIUM SERPL-SCNC: 138 MMOL/L (ref 136–145)
TRIGL SERPL-MCNC: 83 MG/DL (ref 30–150)
WBC # BLD AUTO: 5.96 K/UL (ref 3.9–12.7)

## 2021-08-17 PROCEDURE — 86695 HERPES SIMPLEX TYPE 1 TEST: CPT | Performed by: SPECIALIST

## 2021-08-17 PROCEDURE — 99999 PR PBB SHADOW E&M-EST. PATIENT-LVL III: ICD-10-PCS | Mod: PBBFAC,,, | Performed by: DERMATOLOGY

## 2021-08-17 PROCEDURE — 86592 SYPHILIS TEST NON-TREP QUAL: CPT | Performed by: SPECIALIST

## 2021-08-17 PROCEDURE — 99213 OFFICE O/P EST LOW 20 MIN: CPT | Mod: PBBFAC,PO | Performed by: DERMATOLOGY

## 2021-08-17 PROCEDURE — 86694 HERPES SIMPLEX NES ANTBDY: CPT | Performed by: SPECIALIST

## 2021-08-17 PROCEDURE — 99214 OFFICE O/P EST MOD 30 MIN: CPT | Mod: S$PBB,,, | Performed by: DERMATOLOGY

## 2021-08-17 PROCEDURE — 86696 HERPES SIMPLEX TYPE 2 TEST: CPT | Performed by: SPECIALIST

## 2021-08-17 PROCEDURE — 80074 ACUTE HEPATITIS PANEL: CPT | Performed by: SPECIALIST

## 2021-08-17 PROCEDURE — 99999 PR PBB SHADOW E&M-EST. PATIENT-LVL III: CPT | Mod: PBBFAC,,, | Performed by: DERMATOLOGY

## 2021-08-17 PROCEDURE — 99214 PR OFFICE/OUTPT VISIT, EST, LEVL IV, 30-39 MIN: ICD-10-PCS | Mod: S$PBB,,, | Performed by: DERMATOLOGY

## 2021-08-17 PROCEDURE — 87389 HIV-1 AG W/HIV-1&-2 AB AG IA: CPT | Performed by: SPECIALIST

## 2021-08-17 PROCEDURE — 85025 COMPLETE CBC W/AUTO DIFF WBC: CPT | Performed by: INTERNAL MEDICINE

## 2021-08-17 PROCEDURE — 80061 LIPID PANEL: CPT | Performed by: DERMATOLOGY

## 2021-08-17 PROCEDURE — 36415 COLL VENOUS BLD VENIPUNCTURE: CPT | Performed by: SPECIALIST

## 2021-08-17 PROCEDURE — 85651 RBC SED RATE NONAUTOMATED: CPT | Performed by: INTERNAL MEDICINE

## 2021-08-17 PROCEDURE — 80053 COMPREHEN METABOLIC PANEL: CPT | Performed by: INTERNAL MEDICINE

## 2021-08-17 RX ORDER — PERMETHRIN 50 MG/G
CREAM TOPICAL
Qty: 60 G | Refills: 1 | Status: SHIPPED | OUTPATIENT
Start: 2021-08-17 | End: 2023-03-08

## 2021-08-17 RX ORDER — ISOTRETINOIN 40 MG/1
40 CAPSULE ORAL DAILY
Qty: 30 CAPSULE | Refills: 0 | Status: SHIPPED | OUTPATIENT
Start: 2021-08-17 | End: 2021-09-17 | Stop reason: SDUPTHER

## 2021-08-17 RX ORDER — TRIAMCINOLONE ACETONIDE 1 MG/G
CREAM TOPICAL
Qty: 60 G | Refills: 3 | Status: SHIPPED | OUTPATIENT
Start: 2021-08-17 | End: 2023-03-08

## 2021-08-18 ENCOUNTER — PATIENT MESSAGE (OUTPATIENT)
Dept: DERMATOLOGY | Facility: CLINIC | Age: 41
End: 2021-08-18

## 2021-08-18 LAB — RPR SER QL: NORMAL

## 2021-08-19 LAB
HAV IGM SERPL QL IA: NEGATIVE
HBV CORE IGM SERPL QL IA: NEGATIVE
HBV SURFACE AG SERPL QL IA: NEGATIVE
HCV AB SERPL QL IA: NEGATIVE
HIV 1+2 AB+HIV1 P24 AG SERPL QL IA: NEGATIVE

## 2021-08-20 LAB
HSV AB, IGM BY EIA: 0.4 INDEX
HSV1 IGG SERPL QL IA: NEGATIVE
HSV2 IGG SERPL QL IA: NEGATIVE

## 2021-08-26 ENCOUNTER — PATIENT MESSAGE (OUTPATIENT)
Dept: OBSTETRICS AND GYNECOLOGY | Facility: CLINIC | Age: 41
End: 2021-08-26

## 2021-08-27 ENCOUNTER — PATIENT MESSAGE (OUTPATIENT)
Dept: OBSTETRICS AND GYNECOLOGY | Facility: CLINIC | Age: 41
End: 2021-08-27

## 2021-09-17 ENCOUNTER — OFFICE VISIT (OUTPATIENT)
Dept: DERMATOLOGY | Facility: CLINIC | Age: 41
End: 2021-09-17
Payer: MEDICAID

## 2021-09-17 ENCOUNTER — PATIENT MESSAGE (OUTPATIENT)
Dept: OBSTETRICS AND GYNECOLOGY | Facility: CLINIC | Age: 41
End: 2021-09-17

## 2021-09-17 VITALS — BODY MASS INDEX: 25.45 KG/M2 | WEIGHT: 149.06 LBS | HEIGHT: 64 IN

## 2021-09-17 DIAGNOSIS — L70.0 CYSTIC ACNE: Primary | ICD-10-CM

## 2021-09-17 DIAGNOSIS — Z51.81 MEDICATION MONITORING ENCOUNTER: ICD-10-CM

## 2021-09-17 PROCEDURE — 99999 PR PBB SHADOW E&M-EST. PATIENT-LVL III: ICD-10-PCS | Mod: PBBFAC,,, | Performed by: DERMATOLOGY

## 2021-09-17 PROCEDURE — 99999 PR PBB SHADOW E&M-EST. PATIENT-LVL III: CPT | Mod: PBBFAC,,, | Performed by: DERMATOLOGY

## 2021-09-17 PROCEDURE — 99214 PR OFFICE/OUTPT VISIT, EST, LEVL IV, 30-39 MIN: ICD-10-PCS | Mod: S$PBB,,, | Performed by: DERMATOLOGY

## 2021-09-17 PROCEDURE — 99214 OFFICE O/P EST MOD 30 MIN: CPT | Mod: S$PBB,,, | Performed by: DERMATOLOGY

## 2021-09-17 PROCEDURE — 99213 OFFICE O/P EST LOW 20 MIN: CPT | Mod: PBBFAC,PO | Performed by: DERMATOLOGY

## 2021-09-17 RX ORDER — ISOTRETINOIN 40 MG/1
40 CAPSULE ORAL DAILY
Qty: 30 CAPSULE | Refills: 0 | Status: SHIPPED | OUTPATIENT
Start: 2021-09-17 | End: 2021-10-15 | Stop reason: SDUPTHER

## 2021-09-29 ENCOUNTER — PATIENT MESSAGE (OUTPATIENT)
Dept: OBSTETRICS AND GYNECOLOGY | Facility: CLINIC | Age: 41
End: 2021-09-29

## 2021-10-15 ENCOUNTER — OFFICE VISIT (OUTPATIENT)
Dept: DERMATOLOGY | Facility: CLINIC | Age: 41
End: 2021-10-15
Payer: MEDICAID

## 2021-10-15 ENCOUNTER — PATIENT MESSAGE (OUTPATIENT)
Dept: DERMATOLOGY | Facility: CLINIC | Age: 41
End: 2021-10-15

## 2021-10-15 DIAGNOSIS — K13.0 CHEILITIS: ICD-10-CM

## 2021-10-15 DIAGNOSIS — L70.0 CYSTIC ACNE: Primary | ICD-10-CM

## 2021-10-15 DIAGNOSIS — Z51.81 MEDICATION MONITORING ENCOUNTER: ICD-10-CM

## 2021-10-15 PROCEDURE — 99213 OFFICE O/P EST LOW 20 MIN: CPT | Mod: PBBFAC,PO | Performed by: DERMATOLOGY

## 2021-10-15 PROCEDURE — 99214 OFFICE O/P EST MOD 30 MIN: CPT | Mod: S$PBB,,, | Performed by: DERMATOLOGY

## 2021-10-15 PROCEDURE — 99999 PR PBB SHADOW E&M-EST. PATIENT-LVL III: ICD-10-PCS | Mod: PBBFAC,,, | Performed by: DERMATOLOGY

## 2021-10-15 PROCEDURE — 99999 PR PBB SHADOW E&M-EST. PATIENT-LVL III: CPT | Mod: PBBFAC,,, | Performed by: DERMATOLOGY

## 2021-10-15 PROCEDURE — 99214 PR OFFICE/OUTPT VISIT, EST, LEVL IV, 30-39 MIN: ICD-10-PCS | Mod: S$PBB,,, | Performed by: DERMATOLOGY

## 2021-10-15 RX ORDER — TRIAMCINOLONE ACETONIDE 0.25 MG/G
OINTMENT TOPICAL
Qty: 15 G | Refills: 1 | Status: SHIPPED | OUTPATIENT
Start: 2021-10-15 | End: 2021-11-19 | Stop reason: SDUPTHER

## 2021-10-15 RX ORDER — ISOTRETINOIN 40 MG/1
40 CAPSULE ORAL DAILY
Qty: 30 CAPSULE | Refills: 0 | Status: SHIPPED | OUTPATIENT
Start: 2021-10-15 | End: 2021-11-19 | Stop reason: SDUPTHER

## 2021-11-15 ENCOUNTER — LAB VISIT (OUTPATIENT)
Dept: LAB | Facility: HOSPITAL | Age: 41
End: 2021-11-15
Attending: DERMATOLOGY
Payer: MEDICAID

## 2021-11-15 DIAGNOSIS — Z51.81 MEDICATION MONITORING ENCOUNTER: ICD-10-CM

## 2021-11-15 LAB
ALBUMIN SERPL BCP-MCNC: 3.3 G/DL (ref 3.5–5.2)
ALP SERPL-CCNC: 49 U/L (ref 55–135)
ALT SERPL W/O P-5'-P-CCNC: 23 U/L (ref 10–44)
AST SERPL-CCNC: 17 U/L (ref 10–40)
BILIRUB DIRECT SERPL-MCNC: 0.1 MG/DL (ref 0.1–0.3)
BILIRUB SERPL-MCNC: 0.3 MG/DL (ref 0.1–1)
CHOLEST SERPL-MCNC: 190 MG/DL (ref 120–199)
CHOLEST/HDLC SERPL: 4 {RATIO} (ref 2–5)
HDLC SERPL-MCNC: 47 MG/DL (ref 40–75)
HDLC SERPL: 24.7 % (ref 20–50)
LDLC SERPL CALC-MCNC: 109.8 MG/DL (ref 63–159)
NONHDLC SERPL-MCNC: 143 MG/DL
PROT SERPL-MCNC: 6.6 G/DL (ref 6–8.4)
TRIGL SERPL-MCNC: 166 MG/DL (ref 30–150)

## 2021-11-15 PROCEDURE — 80076 HEPATIC FUNCTION PANEL: CPT | Performed by: DERMATOLOGY

## 2021-11-15 PROCEDURE — 80061 LIPID PANEL: CPT | Performed by: DERMATOLOGY

## 2021-11-15 PROCEDURE — 36415 COLL VENOUS BLD VENIPUNCTURE: CPT | Performed by: DERMATOLOGY

## 2021-11-19 ENCOUNTER — OFFICE VISIT (OUTPATIENT)
Dept: DERMATOLOGY | Facility: CLINIC | Age: 41
End: 2021-11-19
Payer: MEDICAID

## 2021-11-19 VITALS — WEIGHT: 149 LBS | HEIGHT: 64 IN | BODY MASS INDEX: 25.44 KG/M2

## 2021-11-19 DIAGNOSIS — L30.9 DERMATITIS: ICD-10-CM

## 2021-11-19 DIAGNOSIS — K13.0 CHEILITIS: ICD-10-CM

## 2021-11-19 DIAGNOSIS — Z51.81 THERAPEUTIC DRUG MONITORING: ICD-10-CM

## 2021-11-19 DIAGNOSIS — L70.0 CYSTIC ACNE: Primary | ICD-10-CM

## 2021-11-19 PROCEDURE — 99999 PR PBB SHADOW E&M-EST. PATIENT-LVL III: ICD-10-PCS | Mod: PBBFAC,,, | Performed by: DERMATOLOGY

## 2021-11-19 PROCEDURE — 99214 PR OFFICE/OUTPT VISIT, EST, LEVL IV, 30-39 MIN: ICD-10-PCS | Mod: S$PBB,,, | Performed by: DERMATOLOGY

## 2021-11-19 PROCEDURE — 99213 OFFICE O/P EST LOW 20 MIN: CPT | Mod: PBBFAC,PO | Performed by: DERMATOLOGY

## 2021-11-19 PROCEDURE — 99214 OFFICE O/P EST MOD 30 MIN: CPT | Mod: S$PBB,,, | Performed by: DERMATOLOGY

## 2021-11-19 PROCEDURE — 99999 PR PBB SHADOW E&M-EST. PATIENT-LVL III: CPT | Mod: PBBFAC,,, | Performed by: DERMATOLOGY

## 2021-11-19 RX ORDER — TRIAMCINOLONE ACETONIDE 0.25 MG/G
OINTMENT TOPICAL
Qty: 80 G | Refills: 1 | Status: SHIPPED | OUTPATIENT
Start: 2021-11-19 | End: 2023-03-08

## 2021-11-19 RX ORDER — ISOTRETINOIN 30 MG/1
CAPSULE ORAL
Qty: 60 CAPSULE | Refills: 0 | Status: SHIPPED | OUTPATIENT
Start: 2021-11-19 | End: 2021-11-19

## 2021-11-19 RX ORDER — AMOXICILLIN 500 MG/1
500 CAPSULE ORAL 3 TIMES DAILY
COMMUNITY
Start: 2021-10-27 | End: 2021-12-15

## 2021-11-19 RX ORDER — ISOTRETINOIN 30 MG/1
CAPSULE ORAL
Qty: 60 CAPSULE | Refills: 0 | Status: SHIPPED | OUTPATIENT
Start: 2021-11-19 | End: 2023-03-08

## 2021-12-15 ENCOUNTER — OFFICE VISIT (OUTPATIENT)
Dept: RHEUMATOLOGY | Facility: CLINIC | Age: 41
End: 2021-12-15
Payer: MEDICAID

## 2021-12-15 VITALS — BODY MASS INDEX: 26.09 KG/M2 | SYSTOLIC BLOOD PRESSURE: 126 MMHG | DIASTOLIC BLOOD PRESSURE: 88 MMHG | WEIGHT: 152 LBS

## 2021-12-15 DIAGNOSIS — M79.7 FIBROMYALGIA: Primary | ICD-10-CM

## 2021-12-15 PROCEDURE — 99213 OFFICE O/P EST LOW 20 MIN: CPT | Mod: S$GLB,,, | Performed by: INTERNAL MEDICINE

## 2021-12-15 PROCEDURE — 99213 PR OFFICE/OUTPT VISIT, EST, LEVL III, 20-29 MIN: ICD-10-PCS | Mod: S$GLB,,, | Performed by: INTERNAL MEDICINE

## 2021-12-15 RX ORDER — GABAPENTIN 800 MG/1
800 TABLET ORAL 2 TIMES DAILY
Qty: 60 TABLET | Refills: 3 | Status: SHIPPED | OUTPATIENT
Start: 2021-12-15 | End: 2021-12-25 | Stop reason: SDUPTHER

## 2021-12-15 RX ORDER — CYCLOBENZAPRINE HCL 10 MG
TABLET ORAL
Qty: 60 TABLET | Refills: 3 | Status: SHIPPED | OUTPATIENT
Start: 2021-12-15

## 2021-12-21 ENCOUNTER — PATIENT MESSAGE (OUTPATIENT)
Dept: DERMATOLOGY | Facility: CLINIC | Age: 41
End: 2021-12-21

## 2021-12-21 ENCOUNTER — OFFICE VISIT (OUTPATIENT)
Dept: DERMATOLOGY | Facility: CLINIC | Age: 41
End: 2021-12-21
Payer: MEDICAID

## 2021-12-21 DIAGNOSIS — Z51.81 MEDICATION MONITORING ENCOUNTER: ICD-10-CM

## 2021-12-21 DIAGNOSIS — E78.5 DYSLIPIDEMIA: ICD-10-CM

## 2021-12-21 DIAGNOSIS — L70.0 ACNE VULGARIS: Primary | ICD-10-CM

## 2021-12-21 DIAGNOSIS — K13.0 CHEILITIS: ICD-10-CM

## 2021-12-21 PROCEDURE — 99214 OFFICE O/P EST MOD 30 MIN: CPT | Mod: 95,,, | Performed by: STUDENT IN AN ORGANIZED HEALTH CARE EDUCATION/TRAINING PROGRAM

## 2021-12-21 PROCEDURE — 99214 PR OFFICE/OUTPT VISIT, EST, LEVL IV, 30-39 MIN: ICD-10-PCS | Mod: 95,,, | Performed by: STUDENT IN AN ORGANIZED HEALTH CARE EDUCATION/TRAINING PROGRAM

## 2021-12-21 RX ORDER — TRIAMCINOLONE ACETONIDE 0.25 MG/G
CREAM TOPICAL 2 TIMES DAILY
Qty: 80 G | Refills: 0 | Status: SHIPPED | OUTPATIENT
Start: 2021-12-21 | End: 2023-03-08

## 2022-01-24 ENCOUNTER — PATIENT MESSAGE (OUTPATIENT)
Dept: DERMATOLOGY | Facility: CLINIC | Age: 42
End: 2022-01-24
Payer: MEDICAID

## 2022-01-24 ENCOUNTER — LAB VISIT (OUTPATIENT)
Dept: LAB | Facility: HOSPITAL | Age: 42
End: 2022-01-24
Attending: DERMATOLOGY
Payer: MEDICAID

## 2022-01-24 DIAGNOSIS — Z51.81 MEDICATION MONITORING ENCOUNTER: ICD-10-CM

## 2022-01-24 DIAGNOSIS — E78.5 DYSLIPIDEMIA: ICD-10-CM

## 2022-01-24 LAB
ALBUMIN SERPL BCP-MCNC: 3.6 G/DL (ref 3.5–5.2)
ALP SERPL-CCNC: 45 U/L (ref 55–135)
ALT SERPL W/O P-5'-P-CCNC: 16 U/L (ref 10–44)
ANION GAP SERPL CALC-SCNC: 9 MMOL/L (ref 8–16)
AST SERPL-CCNC: 13 U/L (ref 10–40)
BILIRUB SERPL-MCNC: 0.4 MG/DL (ref 0.1–1)
BUN SERPL-MCNC: 10 MG/DL (ref 6–20)
CALCIUM SERPL-MCNC: 8.8 MG/DL (ref 8.7–10.5)
CHLORIDE SERPL-SCNC: 107 MMOL/L (ref 95–110)
CHOLEST SERPL-MCNC: 204 MG/DL (ref 120–199)
CHOLEST/HDLC SERPL: 5.1 {RATIO} (ref 2–5)
CO2 SERPL-SCNC: 23 MMOL/L (ref 23–29)
CREAT SERPL-MCNC: 0.8 MG/DL (ref 0.5–1.4)
EST. GFR  (AFRICAN AMERICAN): >60 ML/MIN/1.73 M^2
EST. GFR  (NON AFRICAN AMERICAN): >60 ML/MIN/1.73 M^2
GLUCOSE SERPL-MCNC: 92 MG/DL (ref 70–110)
HDLC SERPL-MCNC: 40 MG/DL (ref 40–75)
HDLC SERPL: 19.6 % (ref 20–50)
LDLC SERPL CALC-MCNC: 121.4 MG/DL (ref 63–159)
NONHDLC SERPL-MCNC: 164 MG/DL
POTASSIUM SERPL-SCNC: 3.7 MMOL/L (ref 3.5–5.1)
PROT SERPL-MCNC: 7 G/DL (ref 6–8.4)
SODIUM SERPL-SCNC: 139 MMOL/L (ref 136–145)
TRIGL SERPL-MCNC: 213 MG/DL (ref 30–150)

## 2022-01-24 PROCEDURE — 80053 COMPREHEN METABOLIC PANEL: CPT | Performed by: STUDENT IN AN ORGANIZED HEALTH CARE EDUCATION/TRAINING PROGRAM

## 2022-01-24 PROCEDURE — 80061 LIPID PANEL: CPT | Performed by: STUDENT IN AN ORGANIZED HEALTH CARE EDUCATION/TRAINING PROGRAM

## 2022-01-24 PROCEDURE — 36415 COLL VENOUS BLD VENIPUNCTURE: CPT | Performed by: STUDENT IN AN ORGANIZED HEALTH CARE EDUCATION/TRAINING PROGRAM

## 2022-02-14 ENCOUNTER — OFFICE VISIT (OUTPATIENT)
Dept: DERMATOLOGY | Facility: CLINIC | Age: 42
End: 2022-02-14
Payer: MEDICAID

## 2022-02-14 DIAGNOSIS — L70.0 ACNE VULGARIS: Primary | ICD-10-CM

## 2022-02-14 DIAGNOSIS — E78.5 DYSLIPIDEMIA: ICD-10-CM

## 2022-02-14 DIAGNOSIS — L73.0 ACNE SCARRING: ICD-10-CM

## 2022-02-14 PROCEDURE — 99999 PR PBB SHADOW E&M-EST. PATIENT-LVL II: CPT | Mod: PBBFAC,,, | Performed by: STUDENT IN AN ORGANIZED HEALTH CARE EDUCATION/TRAINING PROGRAM

## 2022-02-14 PROCEDURE — 1159F PR MEDICATION LIST DOCUMENTED IN MEDICAL RECORD: ICD-10-PCS | Mod: CPTII,,, | Performed by: STUDENT IN AN ORGANIZED HEALTH CARE EDUCATION/TRAINING PROGRAM

## 2022-02-14 PROCEDURE — 99214 PR OFFICE/OUTPT VISIT, EST, LEVL IV, 30-39 MIN: ICD-10-PCS | Mod: S$PBB,,, | Performed by: STUDENT IN AN ORGANIZED HEALTH CARE EDUCATION/TRAINING PROGRAM

## 2022-02-14 PROCEDURE — 1159F MED LIST DOCD IN RCRD: CPT | Mod: CPTII,,, | Performed by: STUDENT IN AN ORGANIZED HEALTH CARE EDUCATION/TRAINING PROGRAM

## 2022-02-14 PROCEDURE — 1160F PR REVIEW ALL MEDS BY PRESCRIBER/CLIN PHARMACIST DOCUMENTED: ICD-10-PCS | Mod: CPTII,,, | Performed by: STUDENT IN AN ORGANIZED HEALTH CARE EDUCATION/TRAINING PROGRAM

## 2022-02-14 PROCEDURE — 1160F RVW MEDS BY RX/DR IN RCRD: CPT | Mod: CPTII,,, | Performed by: STUDENT IN AN ORGANIZED HEALTH CARE EDUCATION/TRAINING PROGRAM

## 2022-02-14 PROCEDURE — 99212 OFFICE O/P EST SF 10 MIN: CPT | Mod: PBBFAC,PO | Performed by: STUDENT IN AN ORGANIZED HEALTH CARE EDUCATION/TRAINING PROGRAM

## 2022-02-14 PROCEDURE — 99214 OFFICE O/P EST MOD 30 MIN: CPT | Mod: S$PBB,,, | Performed by: STUDENT IN AN ORGANIZED HEALTH CARE EDUCATION/TRAINING PROGRAM

## 2022-02-14 PROCEDURE — 99999 PR PBB SHADOW E&M-EST. PATIENT-LVL II: ICD-10-PCS | Mod: PBBFAC,,, | Performed by: STUDENT IN AN ORGANIZED HEALTH CARE EDUCATION/TRAINING PROGRAM

## 2022-02-14 RX ORDER — ISOTRETINOIN 30 MG/1
30 CAPSULE ORAL 2 TIMES DAILY
Qty: 60 CAPSULE | Refills: 0 | Status: SHIPPED | OUTPATIENT
Start: 2022-02-14 | End: 2022-03-16

## 2022-02-14 NOTE — PROGRESS NOTES
Subjective:       Patient ID:  Moris Alfaro is a 42 y.o. female who presents for   Chief Complaint   Patient presents with    Follow-up     accutane    Acne     LOV 12/21/21    Patient here today for acne, last seen in December, completed 3 months of isotretinoin, last dose was 60mg daily. After last visit she was instructed to get labs as her lipid panel was abnormal and she had a dose increase. She did not get labs until > 1 month later so isotretinoin was not refilled.   She would like to be re-registered today.   Has had some under the skin acne bumps since last visit.     Had a hysterectomy        Denies mood changes, headaches.       Review of Systems   Constitutional: Negative for fever, chills and fatigue.   Respiratory: Negative for cough and shortness of breath.    Gastrointestinal: Negative for nausea and vomiting.   Skin: Positive for dry skin and dry lips.        Objective:    Physical Exam   Constitutional: She appears well-developed and well-nourished.   Neurological: She is alert and oriented to person, place, and time.   Psychiatric: She has a normal mood and affect.   Skin:   Areas Examined (abnormalities noted in diagram):   Head / Face Inspection Performed  Neck Inspection Performed              Diagram Legend     Erythematous scaling macule/papule c/w actinic keratosis       Vascular papule c/w angioma      Pigmented verrucoid papule/plaque c/w seborrheic keratosis      Yellow umbilicated papule c/w sebaceous hyperplasia      Irregularly shaped tan macule c/w lentigo     1-2 mm smooth white papules consistent with Milia      Movable subcutaneous cyst with punctum c/w epidermal inclusion cyst      Subcutaneous movable cyst c/w pilar cyst      Firm pink to brown papule c/w dermatofibroma      Pedunculated fleshy papule(s) c/w skin tag(s)      Evenly pigmented macule c/w junctional nevus     Mildly variegated pigmented, slightly irregular-bordered macule c/w mildly atypical nevus       Flesh colored to evenly pigmented papule c/w intradermal nevus       Pink pearly papule/plaque c/w basal cell carcinoma      Erythematous hyperkeratotic cursted plaque c/w SCC      Surgical scar with no sign of skin cancer recurrence      Open and closed comedones      Inflammatory papules and pustules      Verrucoid papule consistent consistent with wart     Erythematous eczematous patches and plaques     Dystrophic onycholytic nail with subungual debris c/w onychomycosis     Umbilicated papule    Erythematous-base heme-crusted tan verrucoid plaque consistent with inflamed seborrheic keratosis     Erythematous Silvery Scaling Plaque c/w Psoriasis     See annotation      Assessment / Plan:        Acne vulgaris  Acne scarring  -     ISOtretinoin (ACCUTANE) 30 MG capsule; Take 1 capsule (30 mg total) by mouth 2 (two) times daily.  Dispense: 60 capsule; Refill: 0  - s/p month 3 - removed from ipledge due to lack of f/u- registered today  Total dose to date 30 x (40+ 40 + 60)= 4200 mg   Goal dose 125-200 x 68  ipledge # on file  email address onfile  Contraception methods              1- HYSTERECTOMY              2-  Discussed risks and benefits of Isotretinoin including but not limited to dry eyes, dry skin, and dry lips; headaches; nosebleeds; muscle aches; joint aches; elevated liver functions; elevated cholesterol or triglycerides; depression; diarrhea/stomach cramping (inflammatory bowel disease); increased sun sensitivity; birth defects. No laser while on Isotretinoin or for one month after completion of Isotretinoin course. No waxing and no donating blood while on Isotretinoin or for one month after completion of Isotretinoin course.    Dyslipidemia  - reviewed labs  - will need to recheck next month  - discussed importance of low fat diet.            4 weeks  No follow-ups on file.

## 2022-02-18 ENCOUNTER — OFFICE VISIT (OUTPATIENT)
Dept: ENDOCRINOLOGY | Facility: CLINIC | Age: 42
End: 2022-02-18
Payer: MEDICAID

## 2022-02-18 ENCOUNTER — LAB VISIT (OUTPATIENT)
Dept: LAB | Facility: HOSPITAL | Age: 42
End: 2022-02-18
Attending: INTERNAL MEDICINE
Payer: MEDICAID

## 2022-02-18 VITALS
OXYGEN SATURATION: 98 % | SYSTOLIC BLOOD PRESSURE: 110 MMHG | WEIGHT: 154.75 LBS | HEIGHT: 64 IN | DIASTOLIC BLOOD PRESSURE: 72 MMHG | TEMPERATURE: 98 F | HEART RATE: 85 BPM | BODY MASS INDEX: 26.42 KG/M2

## 2022-02-18 DIAGNOSIS — E78.00 HYPERCHOLESTEROLEMIA: ICD-10-CM

## 2022-02-18 DIAGNOSIS — F32.89 OTHER DEPRESSION: ICD-10-CM

## 2022-02-18 DIAGNOSIS — E04.9 GOITER: ICD-10-CM

## 2022-02-18 DIAGNOSIS — I10 PRIMARY HYPERTENSION: ICD-10-CM

## 2022-02-18 DIAGNOSIS — F44.9 CONVERSION DISORDER: ICD-10-CM

## 2022-02-18 DIAGNOSIS — E04.1 THYROID NODULE: ICD-10-CM

## 2022-02-18 DIAGNOSIS — M79.7 FIBROMYALGIA: ICD-10-CM

## 2022-02-18 DIAGNOSIS — K21.9 GERD WITHOUT ESOPHAGITIS: ICD-10-CM

## 2022-02-18 DIAGNOSIS — Z90.710 S/P HYSTERECTOMY: ICD-10-CM

## 2022-02-18 DIAGNOSIS — E78.5 HYPERLIPIDEMIA, UNSPECIFIED HYPERLIPIDEMIA TYPE: ICD-10-CM

## 2022-02-18 DIAGNOSIS — R94.6 ABNORMAL THYROID FUNCTION TEST: ICD-10-CM

## 2022-02-18 DIAGNOSIS — E03.9 HYPOTHYROIDISM (ACQUIRED): ICD-10-CM

## 2022-02-18 DIAGNOSIS — E06.9 THYROIDITIS: ICD-10-CM

## 2022-02-18 DIAGNOSIS — G43.009 MIGRAINE WITHOUT AURA AND WITHOUT STATUS MIGRAINOSUS, NOT INTRACTABLE: ICD-10-CM

## 2022-02-18 DIAGNOSIS — I34.1 MITRAL VALVE PROLAPSE: ICD-10-CM

## 2022-02-18 DIAGNOSIS — E11.65 TYPE 2 DIABETES MELLITUS WITH HYPERGLYCEMIA, WITHOUT LONG-TERM CURRENT USE OF INSULIN: Primary | ICD-10-CM

## 2022-02-18 DIAGNOSIS — E11.65 TYPE 2 DIABETES MELLITUS WITH HYPERGLYCEMIA, WITHOUT LONG-TERM CURRENT USE OF INSULIN: ICD-10-CM

## 2022-02-18 DIAGNOSIS — F90.0 ATTENTION DEFICIT HYPERACTIVITY DISORDER (ADHD), PREDOMINANTLY INATTENTIVE TYPE: ICD-10-CM

## 2022-02-18 DIAGNOSIS — F41.9 ANXIETY: ICD-10-CM

## 2022-02-18 DIAGNOSIS — F44.5 PSEUDOSEIZURES: ICD-10-CM

## 2022-02-18 LAB
ESTIMATED AVG GLUCOSE: 100 MG/DL (ref 68–131)
HBA1C MFR BLD: 5.1 % (ref 4–5.6)
TSH SERPL DL<=0.005 MIU/L-ACNC: 1.14 UIU/ML (ref 0.4–4)
URATE SERPL-MCNC: 4.2 MG/DL (ref 2.4–5.7)

## 2022-02-18 PROCEDURE — 99215 OFFICE O/P EST HI 40 MIN: CPT | Mod: PBBFAC,PO | Performed by: INTERNAL MEDICINE

## 2022-02-18 PROCEDURE — 3008F BODY MASS INDEX DOCD: CPT | Mod: CPTII,,, | Performed by: INTERNAL MEDICINE

## 2022-02-18 PROCEDURE — 99999 PR PBB SHADOW E&M-EST. PATIENT-LVL V: CPT | Mod: PBBFAC,,, | Performed by: INTERNAL MEDICINE

## 2022-02-18 PROCEDURE — 1159F MED LIST DOCD IN RCRD: CPT | Mod: CPTII,,, | Performed by: INTERNAL MEDICINE

## 2022-02-18 PROCEDURE — 3074F SYST BP LT 130 MM HG: CPT | Mod: CPTII,,, | Performed by: INTERNAL MEDICINE

## 2022-02-18 PROCEDURE — 84550 ASSAY OF BLOOD/URIC ACID: CPT | Performed by: INTERNAL MEDICINE

## 2022-02-18 PROCEDURE — 99214 PR OFFICE/OUTPT VISIT, EST, LEVL IV, 30-39 MIN: ICD-10-PCS | Mod: S$PBB,,, | Performed by: INTERNAL MEDICINE

## 2022-02-18 PROCEDURE — 3008F PR BODY MASS INDEX (BMI) DOCUMENTED: ICD-10-PCS | Mod: CPTII,,, | Performed by: INTERNAL MEDICINE

## 2022-02-18 PROCEDURE — 84443 ASSAY THYROID STIM HORMONE: CPT | Performed by: INTERNAL MEDICINE

## 2022-02-18 PROCEDURE — 1159F PR MEDICATION LIST DOCUMENTED IN MEDICAL RECORD: ICD-10-PCS | Mod: CPTII,,, | Performed by: INTERNAL MEDICINE

## 2022-02-18 PROCEDURE — 3078F PR MOST RECENT DIASTOLIC BLOOD PRESSURE < 80 MM HG: ICD-10-PCS | Mod: CPTII,,, | Performed by: INTERNAL MEDICINE

## 2022-02-18 PROCEDURE — 1160F PR REVIEW ALL MEDS BY PRESCRIBER/CLIN PHARMACIST DOCUMENTED: ICD-10-PCS | Mod: CPTII,,, | Performed by: INTERNAL MEDICINE

## 2022-02-18 PROCEDURE — 36415 COLL VENOUS BLD VENIPUNCTURE: CPT | Mod: PO | Performed by: INTERNAL MEDICINE

## 2022-02-18 PROCEDURE — 3074F PR MOST RECENT SYSTOLIC BLOOD PRESSURE < 130 MM HG: ICD-10-PCS | Mod: CPTII,,, | Performed by: INTERNAL MEDICINE

## 2022-02-18 PROCEDURE — 85025 COMPLETE CBC W/AUTO DIFF WBC: CPT | Performed by: INTERNAL MEDICINE

## 2022-02-18 PROCEDURE — 99999 PR PBB SHADOW E&M-EST. PATIENT-LVL V: ICD-10-PCS | Mod: PBBFAC,,, | Performed by: INTERNAL MEDICINE

## 2022-02-18 PROCEDURE — 1160F RVW MEDS BY RX/DR IN RCRD: CPT | Mod: CPTII,,, | Performed by: INTERNAL MEDICINE

## 2022-02-18 PROCEDURE — 3078F DIAST BP <80 MM HG: CPT | Mod: CPTII,,, | Performed by: INTERNAL MEDICINE

## 2022-02-18 PROCEDURE — 83036 HEMOGLOBIN GLYCOSYLATED A1C: CPT | Performed by: INTERNAL MEDICINE

## 2022-02-18 PROCEDURE — 99214 OFFICE O/P EST MOD 30 MIN: CPT | Mod: S$PBB,,, | Performed by: INTERNAL MEDICINE

## 2022-02-18 NOTE — PROGRESS NOTES
Subjective:      Patient ID: Moris Alfaro is a 42 y.o. female.    Chief Complaint:  Hypothyroidism      Hypothyroidism     Patient is a 42 yr old lady with hypothyroidism on thyroid hormone repletion and type 2 diabetes seen today.    History of Present Illness      The patient, Ms Roper is a 42 yr old lady seen in [facility] on account of subclinical hypothyroidism due to Hashimoto's thyroiditis. She also has a background history of chronic migraines, obesity, early onset type 2 diabetes,  anxiety neurosis, conversion disorder and pseudoseizures in the past in addition to GERD.    Patient also has a poorly defined background past history of fibromyalgia and  had been commenced on thyroid hormone repletion with Lt4 @75mcg QD and cytomel 5mcg Qd.  She is presently diet controlled for type 2 diabetes. At the present time she is entirely diet controlled. She is presently not doing SMBGs at all.  Her most recent HBA1c from 11 /19  was 5.0 reflecting very good glycemic control at that time but that was over 3 yrs ago!!  Patient complains of significant fatigue over the last few months. She has no problems currently with hot flashes but has been having some problems with cold sensitivity.  Patient does have intermittent palpitations.  She has not been checking her SMBGs frequently.  In addition she had a screening thyroid ultrasound (from 07/15) which was essentially -ve for any significant thyroid nodular disease.  Patient feels well. She is sleeping a little better now but continues to have episodic insomnia.  She is s/p CORINA for endometriosis but has one of her ovaries insitu. She has been having intermittent hot flashes and drenching sweats and also has some mouth dryness for which she takes regular free fluid. Her hormonal testing however has not showed her to be perimenopausal.  Patient is now s/p unilateral oophorectomy and unilateral fallopian tube removal.   Patient had her opthalmic screening done in Sept  "2020  ([facility]) and this was normal with no evidence of retinopathy.  Patient does not receive flu vaccinations by choice. Patient has also never pneumovax again by choice.  She has no fresh complaints today, denies any local neck symptoms and has no vasomotor symptoms at the moment.    Review of Systems   Constitutional: Negative for diaphoresis, fatigue and unexpected weight change.   HENT: Negative for facial swelling, trouble swallowing and voice change.    Eyes: Negative for visual disturbance.   Respiratory: Negative for cough and shortness of breath.    Cardiovascular: Negative for chest pain, palpitations and leg swelling.   Gastrointestinal: Negative for abdominal pain, diarrhea, nausea and vomiting.   Endocrine: Negative for cold intolerance and polyuria.   Genitourinary: Negative for flank pain, frequency and menstrual problem (S/P CORINA and unilateral oophorectomy).   Musculoskeletal: Negative for arthralgias, back pain and myalgias.   Skin: Negative for color change, pallor and rash.   Neurological: Negative for dizziness, numbness and headaches.   Hematological: Does not bruise/bleed easily.   Psychiatric/Behavioral: Negative for confusion. The patient is not nervous/anxious.        Objective: /72 (BP Location: Left arm, Patient Position: Sitting, BP Method: Large (Manual))   Pulse 85   Temp 98 °F (36.7 °C) (Oral)   Ht 5' 4" (1.626 m)   Wt 70.2 kg (154 lb 12.2 oz)   SpO2 98%   BMI 26.57 kg/m²  Body surface area is 1.78 meters squared.         Physical Exam  Vitals reviewed.   Constitutional:       Appearance: She is not diaphoretic.      Comments: Pleasant young lady. Not pale, anicteric and afebrile. Well hydrated. Clinically comfortable. Not in any apparent distress. Acyanotic.   HENT:      Mouth/Throat:      Mouth: Mucous membranes are moist.   Eyes:      General: No scleral icterus.     Pupils: Pupils are equal, round, and reactive to light.   Neck:      Thyroid: Thyroid mass " present. No thyromegaly or thyroid tenderness.      Vascular: No carotid bruit.      Trachea: Trachea and phonation normal. No tracheal tenderness.     Cardiovascular:      Rate and Rhythm: Normal rate and regular rhythm.      Pulses: Normal pulses.      Heart sounds: Normal heart sounds. No murmur heard.      Pulmonary:      Effort: No respiratory distress.      Breath sounds: No stridor. No wheezing, rhonchi or rales.   Abdominal:      General: Abdomen is flat.      Tenderness: There is no abdominal tenderness.   Musculoskeletal:         General: No swelling.      Cervical back: Neck supple. No rigidity or tenderness.   Lymphadenopathy:      Cervical: No cervical adenopathy.   Neurological:      General: No focal deficit present.      Mental Status: She is alert and oriented to person, place, and time.      Cranial Nerves: No cranial nerve deficit.      Sensory: No sensory deficit.      Motor: No weakness.      Gait: Gait normal.   Psychiatric:         Mood and Affect: Mood normal.         Behavior: Behavior normal.         Thought Content: Thought content normal.         Judgment: Judgment normal.         Lab Review:     Results for LILLIAM JEAN (MRN [id number]) as of 2/18/2022 14:52   Ref. Range 8/17/2021 15:12 11/15/2021 08:22 1/24/2022 08:57   WBC Latest Ref Range: 3.90 - 12.70 K/uL 5.96     RBC Latest Ref Range: 4.00 - 5.40 M/uL 4.29     Hemoglobin Latest Ref Range: 12.0 - 16.0 g/dL 12.3     Hematocrit Latest Ref Range: 37.0 - 48.5 % 38.9     MCV Latest Ref Range: 82 - 98 fL 91     MCH Latest Ref Range: 27.0 - 31.0 pg 28.7     MCHC Latest Ref Range: 32.0 - 36.0 g/dL 31.6 (L)     RDW Latest Ref Range: 11.5 - 14.5 % 13.0     Platelets Latest Ref Range: 150 - 450 K/uL 256     MPV Latest Ref Range: 9.2 - 12.9 fL 10.6     Gran % Latest Ref Range: 38.0 - 73.0 % 62.4     Lymph % Latest Ref Range: 18.0 - 48.0 % 28.7     Mono % Latest Ref Range: 4.0 - 15.0 % 6.9     Eosinophil % Latest Ref Range: 0.0 - 8.0 % 1.0      Basophil % Latest Ref Range: 0.0 - 1.9 % 0.2     Immature Granulocytes Latest Ref Range: 0.0 - 0.5 % 0.8 (H)     Gran # (ANC) Latest Ref Range: 1.8 - 7.7 K/uL 3.7     Lymph # Latest Ref Range: 1.0 - 4.8 K/uL 1.7     Mono # Latest Ref Range: 0.3 - 1.0 K/uL 0.4     Eos # Latest Ref Range: 0.0 - 0.5 K/uL 0.1     Baso # Latest Ref Range: 0.00 - 0.20 K/uL 0.01     Immature Grans (Abs) Latest Ref Range: 0.00 - 0.04 K/uL 0.05 (H)     nRBC Latest Ref Range: 0 /100 WBC 0     Differential Method Unknown Automated     Sed Rate Latest Ref Range: 0 - 20 mm/Hr 15     Sodium Latest Ref Range: 136 - 145 mmol/L 138  139   Potassium Latest Ref Range: 3.5 - 5.1 mmol/L 3.7  3.7   Chloride Latest Ref Range: 95 - 110 mmol/L 107  107   CO2 Latest Ref Range: 23 - 29 mmol/L 21 (L)  23   Anion Gap Latest Ref Range: 8 - 16 mmol/L 10  9   BUN Latest Ref Range: 6 - 20 mg/dL 11  10   Creatinine Latest Ref Range: 0.5 - 1.4 mg/dL 0.8  0.8   eGFR if non African American Latest Ref Range: >60 mL/min/1.73 m^2 >60  >60   eGFR if  Latest Ref Range: >60 mL/min/1.73 m^2 >60  >60   Glucose Latest Ref Range: 70 - 110 mg/dL 93  92   Calcium Latest Ref Range: 8.7 - 10.5 mg/dL 8.7  8.8   Alkaline Phosphatase Latest Ref Range: 55 - 135 U/L 43 (L) 49 (L) 45 (L)   PROTEIN TOTAL Latest Ref Range: 6.0 - 8.4 g/dL 7.1 6.6 7.0   Albumin Latest Ref Range: 3.5 - 5.2 g/dL 3.8 3.3 (L) 3.6   BILIRUBIN TOTAL Latest Ref Range: 0.1 - 1.0 mg/dL 0.4 0.3 0.4   Bilirubin, Direct Latest Ref Range: 0.1 - 0.3 mg/dL  0.1    AST Latest Ref Range: 10 - 40 U/L 33 17 13   ALT Latest Ref Range: 10 - 44 U/L 38 23 16   Triglycerides Latest Ref Range: 30 - 150 mg/dL 83 166 (H) 213 (H)   Cholesterol Latest Ref Range: 120 - 199 mg/dL 187 190 204 (H)   HDL Latest Ref Range: 40 - 75 mg/dL 53 47 40   HDL/Cholesterol Ratio Latest Ref Range: 20.0 - 50.0 % 28.3 24.7 19.6 (L)   LDL Cholesterol External Latest Ref Range: 63.0 - 159.0 mg/dL 117.4 109.8 121.4   Non-HDL Cholesterol  Latest Units: mg/dL 134 143 164   Total Cholesterol/HDL Ratio Latest Ref Range: 2.0 - 5.0  3.5 4.0 5.1 (H)   Hep A IgM Latest Ref Range: Negative  Negative     Hep B C IgM Latest Ref Range: Negative  Negative     Hepatitis B Surface Ag Latest Ref Range: Negative  Negative     Hepatitis C Ab Latest Ref Range: Negative  Negative     HIV 1/2 Ag/Ab Latest Ref Range: Negative  Negative     HSV Ab, IgM by EIA Latest Ref Range: <=0.90 INDEX 0.40     RPR Latest Ref Range: Non-reactive  Non-reactive     HSV 1 IgG Latest Ref Range: Negative  Negative     HSV 2 IgG Latest Ref Range: Negative  Negative           Assessment:     1. Type 2 diabetes mellitus with hyperglycemia, without long-term current use of insulin  Uric Acid    Urinalysis    Microalbumin/Creatinine Ratio, Urine    CBC Auto Differential    Hemoglobin A1C   2. Hypothyroidism (acquired)  CBC Auto Differential    TSH   3. Goiter  TSH    US Soft Tissue Head Neck Thyroid   4. Thyroiditis     5. Abnormal thyroid function test     6. GERD without esophagitis  CBC Auto Differential   7. Fibromyalgia     8. S/P hysterectomy     9. Primary hypertension  Uric Acid   10. Hyperlipidemia, unspecified hyperlipidemia type  Uric Acid   11. Hypercholesterolemia  Uric Acid   12. Mitral valve prolapse     13. Other depression     14. Attention deficit hyperactivity disorder (ADHD), predominantly inattentive type     15. Conversion disorder     16. Pseudoseizures     17. Anxiety     18. Migraine without aura and without status migrainosus, not intractable     19. Thyroid nodule  US Soft Tissue Head Neck Thyroid        Regarding hypothyroidism; Patient appears clinically euthyroid. To continue LT4 at present dose and may adjust LT4 dose based on results.  Regarding possible thyroid nodule in right hemithyroid; to obtain ffup neck USs.  Regarding early type 2  diabetes; She is to ensure SMBG checks 2-3 x weekly and remain of metformin for now. To also recheck HBA1c.  To continue  strict low glycemic diet adherence as before.   Regarding dyslipidemia; discussed dietary interventions to optimize her lipid profile with aim of LDL being under 100. Will recheck lipid profile in another 6 mths and if not at goal by then will need to consider starting low dose statin therapy.  Regarding GERD; symptomatically stable on PPI therapy.  Regarding hypovitaminosis D; to continue vitamin D supplementation and will recheck 25OH vitamin d levels.  Regarding depression; mood stable. To continue Effexor XR as before.    Due to the current nation wide acute severe supply chain deficit in blood, urine and other lab sample tubes and collection containers, typical labs will not be obtained in the usual profile and  frequency they were obtained in time past for clinical surveillance, investigation, monitoring and/or management.    Plan:       FFup in ~ 6mths

## 2022-02-19 LAB
BASOPHILS # BLD AUTO: 0.02 K/UL (ref 0–0.2)
BASOPHILS NFR BLD: 0.3 % (ref 0–1.9)
DIFFERENTIAL METHOD: ABNORMAL
EOSINOPHIL # BLD AUTO: 0.1 K/UL (ref 0–0.5)
EOSINOPHIL NFR BLD: 1.2 % (ref 0–8)
ERYTHROCYTE [DISTWIDTH] IN BLOOD BY AUTOMATED COUNT: 13.3 % (ref 11.5–14.5)
HCT VFR BLD AUTO: 39.7 % (ref 37–48.5)
HGB BLD-MCNC: 12 G/DL (ref 12–16)
IMM GRANULOCYTES # BLD AUTO: 0.09 K/UL (ref 0–0.04)
IMM GRANULOCYTES NFR BLD AUTO: 1.2 % (ref 0–0.5)
LYMPHOCYTES # BLD AUTO: 2.2 K/UL (ref 1–4.8)
LYMPHOCYTES NFR BLD: 28.8 % (ref 18–48)
MCH RBC QN AUTO: 28 PG (ref 27–31)
MCHC RBC AUTO-ENTMCNC: 30.2 G/DL (ref 32–36)
MCV RBC AUTO: 93 FL (ref 82–98)
MONOCYTES # BLD AUTO: 0.6 K/UL (ref 0.3–1)
MONOCYTES NFR BLD: 8.3 % (ref 4–15)
NEUTROPHILS # BLD AUTO: 4.7 K/UL (ref 1.8–7.7)
NEUTROPHILS NFR BLD: 60.2 % (ref 38–73)
NRBC BLD-RTO: 0 /100 WBC
PLATELET # BLD AUTO: 286 K/UL (ref 150–450)
PMV BLD AUTO: 10.7 FL (ref 9.2–12.9)
RBC # BLD AUTO: 4.28 M/UL (ref 4–5.4)
WBC # BLD AUTO: 7.73 K/UL (ref 3.9–12.7)

## 2022-02-24 ENCOUNTER — HOSPITAL ENCOUNTER (OUTPATIENT)
Dept: RADIOLOGY | Facility: HOSPITAL | Age: 42
Discharge: HOME | End: 2022-02-24
Attending: INTERNAL MEDICINE
Payer: MEDICAID

## 2022-02-24 DIAGNOSIS — E04.9 GOITER: ICD-10-CM

## 2022-02-24 DIAGNOSIS — E04.1 THYROID NODULE: ICD-10-CM

## 2022-02-24 PROCEDURE — 76536 US EXAM OF HEAD AND NECK: CPT | Mod: TC

## 2022-02-24 PROCEDURE — 76536 US EXAM OF HEAD AND NECK: CPT | Mod: 26,,, | Performed by: RADIOLOGY

## 2022-02-24 PROCEDURE — 76536 US SOFT TISSUE HEAD NECK THYROID: ICD-10-PCS | Mod: 26,,, | Performed by: RADIOLOGY

## 2022-02-25 ENCOUNTER — PATIENT MESSAGE (OUTPATIENT)
Dept: ENDOCRINOLOGY | Facility: CLINIC | Age: 42
End: 2022-02-25
Payer: MEDICAID

## 2022-02-28 NOTE — TELEPHONE ENCOUNTER
Spoke with patient, informed to her per Dr. Rucker, if swelling recurs to go to the Urgent care and follow up with PCP, patient verbalized understanding.

## 2022-03-14 ENCOUNTER — OFFICE VISIT (OUTPATIENT)
Dept: DERMATOLOGY | Facility: CLINIC | Age: 42
End: 2022-03-14
Payer: MEDICAID

## 2022-03-14 DIAGNOSIS — L70.0 ACNE VULGARIS: ICD-10-CM

## 2022-03-14 DIAGNOSIS — Z51.81 MEDICATION MONITORING ENCOUNTER: ICD-10-CM

## 2022-03-14 DIAGNOSIS — E78.5 DYSLIPIDEMIA: Primary | ICD-10-CM

## 2022-03-14 DIAGNOSIS — K13.0 CHEILITIS: ICD-10-CM

## 2022-03-14 PROCEDURE — 3066F PR DOCUMENTATION OF TREATMENT FOR NEPHROPATHY: ICD-10-PCS | Mod: CPTII,,, | Performed by: STUDENT IN AN ORGANIZED HEALTH CARE EDUCATION/TRAINING PROGRAM

## 2022-03-14 PROCEDURE — 3061F NEG MICROALBUMINURIA REV: CPT | Mod: CPTII,,, | Performed by: STUDENT IN AN ORGANIZED HEALTH CARE EDUCATION/TRAINING PROGRAM

## 2022-03-14 PROCEDURE — 1160F PR REVIEW ALL MEDS BY PRESCRIBER/CLIN PHARMACIST DOCUMENTED: ICD-10-PCS | Mod: CPTII,,, | Performed by: STUDENT IN AN ORGANIZED HEALTH CARE EDUCATION/TRAINING PROGRAM

## 2022-03-14 PROCEDURE — 99214 PR OFFICE/OUTPT VISIT, EST, LEVL IV, 30-39 MIN: ICD-10-PCS | Mod: S$PBB,,, | Performed by: STUDENT IN AN ORGANIZED HEALTH CARE EDUCATION/TRAINING PROGRAM

## 2022-03-14 PROCEDURE — 3044F HG A1C LEVEL LT 7.0%: CPT | Mod: CPTII,,, | Performed by: STUDENT IN AN ORGANIZED HEALTH CARE EDUCATION/TRAINING PROGRAM

## 2022-03-14 PROCEDURE — 3044F PR MOST RECENT HEMOGLOBIN A1C LEVEL <7.0%: ICD-10-PCS | Mod: CPTII,,, | Performed by: STUDENT IN AN ORGANIZED HEALTH CARE EDUCATION/TRAINING PROGRAM

## 2022-03-14 PROCEDURE — 3061F PR NEG MICROALBUMINURIA RESULT DOCUMENTED/REVIEW: ICD-10-PCS | Mod: CPTII,,, | Performed by: STUDENT IN AN ORGANIZED HEALTH CARE EDUCATION/TRAINING PROGRAM

## 2022-03-14 PROCEDURE — 1159F PR MEDICATION LIST DOCUMENTED IN MEDICAL RECORD: ICD-10-PCS | Mod: CPTII,,, | Performed by: STUDENT IN AN ORGANIZED HEALTH CARE EDUCATION/TRAINING PROGRAM

## 2022-03-14 PROCEDURE — 1160F RVW MEDS BY RX/DR IN RCRD: CPT | Mod: CPTII,,, | Performed by: STUDENT IN AN ORGANIZED HEALTH CARE EDUCATION/TRAINING PROGRAM

## 2022-03-14 PROCEDURE — 99999 PR PBB SHADOW E&M-EST. PATIENT-LVL II: ICD-10-PCS | Mod: PBBFAC,,, | Performed by: STUDENT IN AN ORGANIZED HEALTH CARE EDUCATION/TRAINING PROGRAM

## 2022-03-14 PROCEDURE — 99999 PR PBB SHADOW E&M-EST. PATIENT-LVL II: CPT | Mod: PBBFAC,,, | Performed by: STUDENT IN AN ORGANIZED HEALTH CARE EDUCATION/TRAINING PROGRAM

## 2022-03-14 PROCEDURE — 99214 OFFICE O/P EST MOD 30 MIN: CPT | Mod: S$PBB,,, | Performed by: STUDENT IN AN ORGANIZED HEALTH CARE EDUCATION/TRAINING PROGRAM

## 2022-03-14 PROCEDURE — 3066F NEPHROPATHY DOC TX: CPT | Mod: CPTII,,, | Performed by: STUDENT IN AN ORGANIZED HEALTH CARE EDUCATION/TRAINING PROGRAM

## 2022-03-14 PROCEDURE — 99212 OFFICE O/P EST SF 10 MIN: CPT | Mod: PBBFAC,PO | Performed by: STUDENT IN AN ORGANIZED HEALTH CARE EDUCATION/TRAINING PROGRAM

## 2022-03-14 PROCEDURE — 1159F MED LIST DOCD IN RCRD: CPT | Mod: CPTII,,, | Performed by: STUDENT IN AN ORGANIZED HEALTH CARE EDUCATION/TRAINING PROGRAM

## 2022-03-14 NOTE — PROGRESS NOTES
Subjective:       Patient ID:  Moris Alfaro is a 42 y.o. female who presents for   Chief Complaint   Patient presents with    Follow-up     accutane     LOV 2/14/22    Patient here today for follow up on Accutane, s/p month 4. Skipped 2 months of medication, this is her first month back on it.  (mon 1 x 40, mon 2 x 40, month 3 x 60mg, month 4 x 60mg).   She states her skin is dry and lips are dry. Using tac 0.025% ointment and vaseline/aquaphor  No nosebleeds, some joint pain, no mood changes  She is using Cerave lotion and Neutrogena night lotion  Only breakout on forehead.  Had a hysterectomy      Had frontal headache. Comes and goes, worse with bending forward. Thinks related to allergies.      Review of Systems   Constitutional: Negative for fever, chills and fatigue.   Respiratory: Negative for cough and shortness of breath.    Gastrointestinal: Negative for nausea and vomiting.   Skin: Positive for dry skin and dry lips.        Objective:    Physical Exam   Constitutional: She appears well-developed and well-nourished.   Neurological: She is alert and oriented to person, place, and time.   Psychiatric: She has a normal mood and affect.   Skin:   Areas Examined (abnormalities noted in diagram):   Head / Face Inspection Performed  Neck Inspection Performed              Diagram Legend     Erythematous scaling macule/papule c/w actinic keratosis       Vascular papule c/w angioma      Pigmented verrucoid papule/plaque c/w seborrheic keratosis      Yellow umbilicated papule c/w sebaceous hyperplasia      Irregularly shaped tan macule c/w lentigo     1-2 mm smooth white papules consistent with Milia      Movable subcutaneous cyst with punctum c/w epidermal inclusion cyst      Subcutaneous movable cyst c/w pilar cyst      Firm pink to brown papule c/w dermatofibroma      Pedunculated fleshy papule(s) c/w skin tag(s)      Evenly pigmented macule c/w junctional nevus     Mildly variegated pigmented, slightly  irregular-bordered macule c/w mildly atypical nevus      Flesh colored to evenly pigmented papule c/w intradermal nevus       Pink pearly papule/plaque c/w basal cell carcinoma      Erythematous hyperkeratotic cursted plaque c/w SCC      Surgical scar with no sign of skin cancer recurrence      Open and closed comedones      Inflammatory papules and pustules      Verrucoid papule consistent consistent with wart     Erythematous eczematous patches and plaques     Dystrophic onycholytic nail with subungual debris c/w onychomycosis     Umbilicated papule    Erythematous-base heme-crusted tan verrucoid plaque consistent with inflamed seborrheic keratosis     Erythematous Silvery Scaling Plaque c/w Psoriasis     See annotation      Assessment / Plan:        Dyslipidemia  Medication monitoring encounter  -     Lipid Panel; Future; Expected date: 03/14/2022  Elevated LDL and triglycerides- will recheck since increased dose    Acne vulgaris- will send isotretinoin and confirm when blood work returns  - s/p month 4   Total dose to date 30 x (40+ 40 + 60+ 60)= 6000 mg   Goal dose 125-200 x 69.5 kg  150mg/kg= 10,425mg  ipledge # on file  email address onfile  Contraception methods              1- HYSTERECTOMY              2-  Discussed risks and benefits of Isotretinoin including but not limited to dry eyes, dry skin, and dry lips; headaches; nosebleeds; muscle aches; joint aches; elevated liver functions; elevated cholesterol or triglycerides; depression; diarrhea/stomach cramping (inflammatory bowel disease); increased sun sensitivity; birth defects. No laser while on Isotretinoin or for one month after completion of Isotretinoin course. No waxing and no donating blood while on Isotretinoin or for one month after completion of Isotretinoin course.    Cheilitis  - continue tac ointment BID w/ vaseline or aquaphor       4 weeks    No follow-ups on file.

## 2022-03-16 ENCOUNTER — PATIENT MESSAGE (OUTPATIENT)
Dept: DERMATOLOGY | Facility: CLINIC | Age: 42
End: 2022-03-16
Payer: MEDICAID

## 2022-03-16 ENCOUNTER — LAB VISIT (OUTPATIENT)
Dept: LAB | Facility: HOSPITAL | Age: 42
End: 2022-03-16
Attending: STUDENT IN AN ORGANIZED HEALTH CARE EDUCATION/TRAINING PROGRAM
Payer: MEDICAID

## 2022-03-16 DIAGNOSIS — L70.0 ACNE VULGARIS: Primary | ICD-10-CM

## 2022-03-16 DIAGNOSIS — E78.5 DYSLIPIDEMIA: ICD-10-CM

## 2022-03-16 LAB
CHOLEST SERPL-MCNC: 255 MG/DL (ref 120–199)
CHOLEST/HDLC SERPL: 6.7 {RATIO} (ref 2–5)
HDLC SERPL-MCNC: 38 MG/DL (ref 40–75)
HDLC SERPL: 14.9 % (ref 20–50)
LDLC SERPL CALC-MCNC: 160.2 MG/DL (ref 63–159)
NONHDLC SERPL-MCNC: 217 MG/DL
TRIGL SERPL-MCNC: 284 MG/DL (ref 30–150)

## 2022-03-16 PROCEDURE — 80061 LIPID PANEL: CPT | Performed by: STUDENT IN AN ORGANIZED HEALTH CARE EDUCATION/TRAINING PROGRAM

## 2022-03-16 PROCEDURE — 36415 COLL VENOUS BLD VENIPUNCTURE: CPT | Performed by: STUDENT IN AN ORGANIZED HEALTH CARE EDUCATION/TRAINING PROGRAM

## 2022-03-16 RX ORDER — ISOTRETINOIN 30 MG/1
30 CAPSULE ORAL 2 TIMES DAILY
Qty: 60 CAPSULE | Refills: 0 | Status: SHIPPED | OUTPATIENT
Start: 2022-03-16 | End: 2022-04-15

## 2022-03-31 ENCOUNTER — PATIENT MESSAGE (OUTPATIENT)
Dept: DERMATOLOGY | Facility: CLINIC | Age: 42
End: 2022-03-31
Payer: MEDICAID

## 2022-03-31 DIAGNOSIS — L70.0 ACNE VULGARIS: Primary | ICD-10-CM

## 2022-03-31 RX ORDER — ISOTRETINOIN 30 MG/1
30 CAPSULE ORAL 2 TIMES DAILY
Qty: 60 CAPSULE | Refills: 0 | Status: SHIPPED | OUTPATIENT
Start: 2022-03-31 | End: 2022-09-29

## 2022-04-13 ENCOUNTER — OFFICE VISIT (OUTPATIENT)
Dept: DERMATOLOGY | Facility: CLINIC | Age: 42
End: 2022-04-13
Payer: MEDICAID

## 2022-04-13 DIAGNOSIS — L70.0 ACNE VULGARIS: Primary | ICD-10-CM

## 2022-04-13 DIAGNOSIS — E78.5 DYSLIPIDEMIA: ICD-10-CM

## 2022-04-13 DIAGNOSIS — K13.0 CHEILITIS: ICD-10-CM

## 2022-04-13 DIAGNOSIS — L73.0 ACNE SCARRING: ICD-10-CM

## 2022-04-13 PROCEDURE — 1159F MED LIST DOCD IN RCRD: CPT | Mod: CPTII,,, | Performed by: STUDENT IN AN ORGANIZED HEALTH CARE EDUCATION/TRAINING PROGRAM

## 2022-04-13 PROCEDURE — 3066F NEPHROPATHY DOC TX: CPT | Mod: CPTII,,, | Performed by: STUDENT IN AN ORGANIZED HEALTH CARE EDUCATION/TRAINING PROGRAM

## 2022-04-13 PROCEDURE — 3061F NEG MICROALBUMINURIA REV: CPT | Mod: CPTII,,, | Performed by: STUDENT IN AN ORGANIZED HEALTH CARE EDUCATION/TRAINING PROGRAM

## 2022-04-13 PROCEDURE — 1159F PR MEDICATION LIST DOCUMENTED IN MEDICAL RECORD: ICD-10-PCS | Mod: CPTII,,, | Performed by: STUDENT IN AN ORGANIZED HEALTH CARE EDUCATION/TRAINING PROGRAM

## 2022-04-13 PROCEDURE — 99214 PR OFFICE/OUTPT VISIT, EST, LEVL IV, 30-39 MIN: ICD-10-PCS | Mod: S$PBB,,, | Performed by: STUDENT IN AN ORGANIZED HEALTH CARE EDUCATION/TRAINING PROGRAM

## 2022-04-13 PROCEDURE — 3044F PR MOST RECENT HEMOGLOBIN A1C LEVEL <7.0%: ICD-10-PCS | Mod: CPTII,,, | Performed by: STUDENT IN AN ORGANIZED HEALTH CARE EDUCATION/TRAINING PROGRAM

## 2022-04-13 PROCEDURE — 1160F RVW MEDS BY RX/DR IN RCRD: CPT | Mod: CPTII,,, | Performed by: STUDENT IN AN ORGANIZED HEALTH CARE EDUCATION/TRAINING PROGRAM

## 2022-04-13 PROCEDURE — 3066F PR DOCUMENTATION OF TREATMENT FOR NEPHROPATHY: ICD-10-PCS | Mod: CPTII,,, | Performed by: STUDENT IN AN ORGANIZED HEALTH CARE EDUCATION/TRAINING PROGRAM

## 2022-04-13 PROCEDURE — 99213 OFFICE O/P EST LOW 20 MIN: CPT | Mod: PBBFAC,PO | Performed by: STUDENT IN AN ORGANIZED HEALTH CARE EDUCATION/TRAINING PROGRAM

## 2022-04-13 PROCEDURE — 1160F PR REVIEW ALL MEDS BY PRESCRIBER/CLIN PHARMACIST DOCUMENTED: ICD-10-PCS | Mod: CPTII,,, | Performed by: STUDENT IN AN ORGANIZED HEALTH CARE EDUCATION/TRAINING PROGRAM

## 2022-04-13 PROCEDURE — 3044F HG A1C LEVEL LT 7.0%: CPT | Mod: CPTII,,, | Performed by: STUDENT IN AN ORGANIZED HEALTH CARE EDUCATION/TRAINING PROGRAM

## 2022-04-13 PROCEDURE — 99214 OFFICE O/P EST MOD 30 MIN: CPT | Mod: S$PBB,,, | Performed by: STUDENT IN AN ORGANIZED HEALTH CARE EDUCATION/TRAINING PROGRAM

## 2022-04-13 PROCEDURE — 99999 PR PBB SHADOW E&M-EST. PATIENT-LVL III: CPT | Mod: PBBFAC,,, | Performed by: STUDENT IN AN ORGANIZED HEALTH CARE EDUCATION/TRAINING PROGRAM

## 2022-04-13 PROCEDURE — 3061F PR NEG MICROALBUMINURIA RESULT DOCUMENTED/REVIEW: ICD-10-PCS | Mod: CPTII,,, | Performed by: STUDENT IN AN ORGANIZED HEALTH CARE EDUCATION/TRAINING PROGRAM

## 2022-04-13 PROCEDURE — 99999 PR PBB SHADOW E&M-EST. PATIENT-LVL III: ICD-10-PCS | Mod: PBBFAC,,, | Performed by: STUDENT IN AN ORGANIZED HEALTH CARE EDUCATION/TRAINING PROGRAM

## 2022-04-13 RX ORDER — ISOTRETINOIN 30 MG/1
30 CAPSULE ORAL 2 TIMES DAILY
Qty: 60 CAPSULE | Refills: 0 | Status: SHIPPED | OUTPATIENT
Start: 2022-04-13 | End: 2022-05-13

## 2022-04-13 NOTE — PROGRESS NOTES
Subjective:       Patient ID:  Moris Alfaro is a 42 y.o. female who presents for   Chief Complaint   Patient presents with    Acne     Patient here today for follow up on Accutane, s/p month 5. Skipped 2 months of medication, this is her first month back on it.  (mon 1 x 40, mon 2 x 40, month 3 x 60mg, month 4 x 60mg, Month 5 x 60mg).   Complains of dry skin on her face. She complains of peeling. She is using cerave cream.   Her lips become dry. She is using tac ointment BID and chapstick.      Had headaches last month which have improved.   No nosebleeds, some joint pain, no mood changes  Had a hysterectomy          Review of Systems   HENT: Negative for nosebleeds and headaches.    Gastrointestinal: Negative for nausea, vomiting, abdominal pain and diarrhea.   Musculoskeletal: Negative for myalgias and arthralgias.   Skin: Positive for dry skin and dry lips. Negative for itching, rash, sun sensitivity, daily sunscreen use, activity-related sunscreen use and recent sunburn.   Neurological: Negative for headaches.   Psychiatric/Behavioral: Negative for depressed mood.        Objective:    Physical Exam   Constitutional: She appears well-developed and well-nourished. No distress.   Neurological: She is alert and oriented to person, place, and time. She is not disoriented.   Psychiatric: She has a normal mood and affect.   Skin:   Areas Examined (abnormalities noted in diagram):   Head / Face Inspection Performed  Neck Inspection Performed              Diagram Legend     Erythematous scaling macule/papule c/w actinic keratosis       Vascular papule c/w angioma      Pigmented verrucoid papule/plaque c/w seborrheic keratosis      Yellow umbilicated papule c/w sebaceous hyperplasia      Irregularly shaped tan macule c/w lentigo     1-2 mm smooth white papules consistent with Milia      Movable subcutaneous cyst with punctum c/w epidermal inclusion cyst      Subcutaneous movable cyst c/w pilar cyst      Firm  pink to brown papule c/w dermatofibroma      Pedunculated fleshy papule(s) c/w skin tag(s)      Evenly pigmented macule c/w junctional nevus     Mildly variegated pigmented, slightly irregular-bordered macule c/w mildly atypical nevus      Flesh colored to evenly pigmented papule c/w intradermal nevus       Pink pearly papule/plaque c/w basal cell carcinoma      Erythematous hyperkeratotic cursted plaque c/w SCC      Surgical scar with no sign of skin cancer recurrence      Open and closed comedones      Inflammatory papules and pustules      Verrucoid papule consistent consistent with wart     Erythematous eczematous patches and plaques     Dystrophic onycholytic nail with subungual debris c/w onychomycosis     Umbilicated papule    Erythematous-base heme-crusted tan verrucoid plaque consistent with inflamed seborrheic keratosis     Erythematous Silvery Scaling Plaque c/w Psoriasis     See annotation      Assessment / Plan:        Acne vulgaris  Acne scarring  -     ISOtretinoin (ACCUTANE) 30 MG capsule; Take 1 capsule (30 mg total) by mouth 2 (two) times daily.  Dispense: 60 capsule; Refill: 0  - s/p month 5  Total dose to date 30 x (40+ 40 + 60+ 60)= 7800 mg   Goal dose 125-200 x 69.5 kg  150mg/kg= 10,425mg  ipledge # on file  email address onfile  Contraception methods              1- HYSTERECTOMY              2-  Discussed risks and benefits of Isotretinoin including but not limited to dry eyes, dry skin, and dry lips; headaches; nosebleeds; muscle aches; joint aches; elevated liver functions; elevated cholesterol or triglycerides; depression; diarrhea/stomach cramping (inflammatory bowel disease); increased sun sensitivity; birth defects. No laser while on Isotretinoin or for one month after completion of Isotretinoin course. No waxing and no donating blood while on Isotretinoin or for one month after completion of Isotretinoin course.    Dyslipidemia  - total cholesterol, ldl, triglycerides increasing  -  continue low fat diet  - start fish oil 1000mg daily  - recheck next month    Cheilitis  - continue tac 0.025% ointment  - add vanicream ointment instead of chapstick  - for dry skin on face increase moisturizer use throughout the day         5 weeks  No follow-ups on file.

## 2022-04-18 ENCOUNTER — OFFICE VISIT (OUTPATIENT)
Dept: RHEUMATOLOGY | Facility: CLINIC | Age: 42
End: 2022-04-18
Payer: MEDICAID

## 2022-04-18 VITALS — DIASTOLIC BLOOD PRESSURE: 65 MMHG | SYSTOLIC BLOOD PRESSURE: 103 MMHG | WEIGHT: 152.5 LBS | BODY MASS INDEX: 26.18 KG/M2

## 2022-04-18 DIAGNOSIS — M79.7 FIBROMYALGIA: Primary | ICD-10-CM

## 2022-04-18 PROCEDURE — 3044F HG A1C LEVEL LT 7.0%: CPT | Mod: S$GLB,,, | Performed by: INTERNAL MEDICINE

## 2022-04-18 PROCEDURE — 3074F PR MOST RECENT SYSTOLIC BLOOD PRESSURE < 130 MM HG: ICD-10-PCS | Mod: S$GLB,,, | Performed by: INTERNAL MEDICINE

## 2022-04-18 PROCEDURE — 3078F DIAST BP <80 MM HG: CPT | Mod: S$GLB,,, | Performed by: INTERNAL MEDICINE

## 2022-04-18 PROCEDURE — 99213 OFFICE O/P EST LOW 20 MIN: CPT | Mod: S$GLB,,, | Performed by: INTERNAL MEDICINE

## 2022-04-18 PROCEDURE — 3074F SYST BP LT 130 MM HG: CPT | Mod: S$GLB,,, | Performed by: INTERNAL MEDICINE

## 2022-04-18 PROCEDURE — 3044F PR MOST RECENT HEMOGLOBIN A1C LEVEL <7.0%: ICD-10-PCS | Mod: S$GLB,,, | Performed by: INTERNAL MEDICINE

## 2022-04-18 PROCEDURE — 3008F BODY MASS INDEX DOCD: CPT | Mod: S$GLB,,, | Performed by: INTERNAL MEDICINE

## 2022-04-18 PROCEDURE — 3008F PR BODY MASS INDEX (BMI) DOCUMENTED: ICD-10-PCS | Mod: S$GLB,,, | Performed by: INTERNAL MEDICINE

## 2022-04-18 PROCEDURE — 3066F NEPHROPATHY DOC TX: CPT | Mod: S$GLB,,, | Performed by: INTERNAL MEDICINE

## 2022-04-18 PROCEDURE — 3078F PR MOST RECENT DIASTOLIC BLOOD PRESSURE < 80 MM HG: ICD-10-PCS | Mod: S$GLB,,, | Performed by: INTERNAL MEDICINE

## 2022-04-18 PROCEDURE — 3061F PR NEG MICROALBUMINURIA RESULT DOCUMENTED/REVIEW: ICD-10-PCS | Mod: S$GLB,,, | Performed by: INTERNAL MEDICINE

## 2022-04-18 PROCEDURE — 99213 PR OFFICE/OUTPT VISIT, EST, LEVL III, 20-29 MIN: ICD-10-PCS | Mod: S$GLB,,, | Performed by: INTERNAL MEDICINE

## 2022-04-18 PROCEDURE — 3061F NEG MICROALBUMINURIA REV: CPT | Mod: S$GLB,,, | Performed by: INTERNAL MEDICINE

## 2022-04-18 PROCEDURE — 3066F PR DOCUMENTATION OF TREATMENT FOR NEPHROPATHY: ICD-10-PCS | Mod: S$GLB,,, | Performed by: INTERNAL MEDICINE

## 2022-04-18 RX ORDER — GABAPENTIN 300 MG/1
300 CAPSULE ORAL 2 TIMES DAILY
Qty: 60 CAPSULE | Refills: 3 | Status: SHIPPED | OUTPATIENT
Start: 2022-04-18 | End: 2023-04-18

## 2022-04-18 NOTE — PROGRESS NOTES
[facility] RHEUMATOLOGY            PROGRESS NOTE      Subjective:       Patient ID:   NAME: Moris Alfaro : 1980     42 y.o. female    Referring Doc: No ref. provider found  Other Physicians:    Chief Complaint:  Fibromyalgia      History of Present Illness:     Patient returns today for a regularly scheduled follow-up visit for fibromyalgia and osteoarthritis      The patient has been experiencing increased arthralgias in shoulder joint and finger joints.  No joint swelling.  No paresthesias.  No fevers or chest pains.  No cough or shortness of breath.  Occasional constipation alternating with diarrhea.            ROS:   GEN:  No  fever, night sweats . weight is stable   + fatigue  SKIN: no rashes, no bruising, no ulcerations, no Raynaud's  HEENT: no HA's, No visual changes, no mucosal ulcers, no sicca symptoms,  CV:   no CP, SOB, PND, ZUÑIGA, no orthopnea, no palpitations  PULM: normal with no SOB, cough, hemoptysis, sputum or pleuritic pain  GI:  no abdominal pain, nausea, vomiting, +constipation alternating with diarrhea, melanotic stools, BRBPR, hematemesis, no dyspha  NEURO: no paresthesias, headaches, visual disturbances, muscle weakness  MUSCULOSKELETAL:no joint swelling, prolonged AM stiffness, no back pain, + muscle pain  Allergies:  Review of patient's allergies indicates:  No Known Allergies    Medications:    Current Outpatient Medications:     boric acid (BORIC ACID) vaginal suppository, Place 1 each (650 mg total) vaginally once daily., Disp: 10 suppository, Rfl: 2    boric acid (BORIC ACID) vaginal suppository, Place 1 each (650 mg total) vaginally once daily., Disp: 10 suppository, Rfl: 1    cyclobenzaprine (FLEXERIL) 10 MG tablet, TAKE 1 TABLET BY MOUTH TWICE DAILY AS NEEDED, Disp: 60 tablet, Rfl: 3    ergocalciferol (ERGOCALCIFEROL) 50,000 unit Cap, TAKE 1 CAPSULE BY MOUTH EVERY 7 DAYS, Disp: 12 capsule, Rfl: 3    FEROSUL 325 mg (65 mg iron) Tab tablet, TAKE 1 TABLET BY MOUTH  EVERY DAY WITH BREAKFAST, Disp: 90 tablet, Rfl: 3    gabapentin (NEURONTIN) 800 MG tablet, TAKE 1 TABLET(800 MG) BY MOUTH TWICE DAILY, Disp: 60 tablet, Rfl: 3    ISOtretinoin (ACCUTANE) 30 MG capsule, Take 1 capsule (30 mg total) by mouth 2 (two) times daily., Disp: 60 capsule, Rfl: 0    levothyroxine (SYNTHROID) 75 MCG tablet, TAKE 1 TABLET BY MOUTH EVERY DAY, Disp: 90 tablet, Rfl: 3    liothyronine (CYTOMEL) 5 MCG Tab, TAKE 1 TABLET BY MOUTH EVERY DAY, Disp: 90 tablet, Rfl: 3    ONETOUCH DELICA LANCETS 33 gauge Misc, TEST EVERY DAY AS DIRECTED, Disp: 100 each, Rfl: 3    ONETOUCH VERIO Strp, USE AS DIRECTED ONCE DAILY, Disp: 100 strip, Rfl: 3    permethrin (ELIMITE) 5 % cream, Apply to AA face 3 times a week at bedtime, Disp: 60 g, Rfl: 1    propranoloL (INDERAL LA) 80 MG 24 hr capsule, TK 1 C PO QD, Disp: , Rfl:     topiramate (TOPAMAX) 50 MG tablet, TAKE 1 TABLET BY MOUTH TWICE DAILY, Disp: 60 tablet, Rfl: 11    traZODone (DESYREL) 50 MG tablet, TAKE 1 TABLET(50 MG) BY MOUTH EVERY EVENING, Disp: 30 tablet, Rfl: 3    triamcinolone acetonide 0.025% (KENALOG) 0.025 % cream, Apply topically 2 (two) times daily., Disp: 80 g, Rfl: 0    triamcinolone acetonide 0.025% (KENALOG) 0.025 % Oint, Thin film to lips and eczematous plaques BID PRN flare, Disp: 80 g, Rfl: 1    triamcinolone acetonide 0.1% (KENALOG) 0.1 % cream, AAA bid, Disp: 60 g, Rfl: 3    gabapentin (NEURONTIN) 300 MG capsule, Take 1 capsule (300 mg total) by mouth 2 (two) times daily., Disp: 60 capsule, Rfl: 3    ISOtretinoin (ACCUTANE) 30 MG capsule, Take 2 caps PO with dinner daily (Patient not taking: Reported on 4/18/2022), Disp: 60 capsule, Rfl: 0    ISOtretinoin (ACCUTANE) 30 MG capsule, Take 1 capsule (30 mg total) by mouth 2 (two) times daily. (Patient not taking: Reported on 4/18/2022), Disp: 60 capsule, Rfl: 0    PMHx/PSHx Updates:        Objective:     Vitals:  Blood pressure 103/65, weight 69.2 kg (152 lb 8 oz).    Physical  Examination:   GEN: no apparent distress, comfortable; AAOx3  SKIN: no rashes,no ulceration, no Raynaud's, no petechiae, no SQ nodules,  HEAD: normal  EYES: no pallor, no icterus  NECK: no masses, thyroid normal, trachea midline, no LAD/LN's, supple  CV: RRR with no murmur; l S1 and S2 reg. ,no gallop no rubs,   CHEST: Normal respiratory effort; CTAB; normal breath sounds; no wheeze or crackles  MUSC/Skeletal: ROM normal; no crepitus; joints without synovitis,  no deformities  No joint swelling or tenderness of PIP, MCP, wrist, elbow, shoulder, or knee joints  EXTREM: no clubbing, cyanosis, no edema  NEURO: grossly intact; motor WNL; AAOx3;   PSYCH: normal mood, affect and behavior  LYMPH: normal cervical, supraclavicular          Labs:   Lab Results   Component Value Date    WBC 7.73 02/18/2022    HGB 12.0 02/18/2022    HCT 39.7 02/18/2022    MCV 93 02/18/2022     02/18/2022    CMP  @LASTLAB(NA,K,CL,CO2,GLU,BUN,Creatinine,Calcium,PROT,Albumin,Bilitot,Alkphos,AST,ALT,CRP,ESR,RF,CCP,PEDRO,SSA,CPK,uric acid) )@  I have reviewed all available lab results and radiology reports.    Radiology/Diagnostic Studies:        Assessment/Plan:   (1) 42 y.o. female with diagnosis of fibromyalgia.  More symptomatic.  Can try gabapentin 300 mg in the morning 300 mg in the middle of the day and 800 mg  at bedtime.   try Voltaren gel as needed  Reviewed most recent CBC and CMP.  Normal            Discussion:     I have explained all of the above in detail and the patient understands all of the current recommendation(s). I have answered all questions to the best of my ability and to their complete satisfaction.       The patient is to continue with the current management plan         RTC in   4 months or before if needed      Electronically signed by Keanu Bravo MD

## 2022-05-18 ENCOUNTER — OFFICE VISIT (OUTPATIENT)
Dept: DERMATOLOGY | Facility: CLINIC | Age: 42
End: 2022-05-18
Payer: MEDICAID

## 2022-05-18 DIAGNOSIS — L70.0 ACNE VULGARIS: ICD-10-CM

## 2022-05-18 DIAGNOSIS — E78.5 DYSLIPIDEMIA: Primary | ICD-10-CM

## 2022-05-18 PROCEDURE — 1159F PR MEDICATION LIST DOCUMENTED IN MEDICAL RECORD: ICD-10-PCS | Mod: CPTII,,, | Performed by: STUDENT IN AN ORGANIZED HEALTH CARE EDUCATION/TRAINING PROGRAM

## 2022-05-18 PROCEDURE — 1160F PR REVIEW ALL MEDS BY PRESCRIBER/CLIN PHARMACIST DOCUMENTED: ICD-10-PCS | Mod: CPTII,,, | Performed by: STUDENT IN AN ORGANIZED HEALTH CARE EDUCATION/TRAINING PROGRAM

## 2022-05-18 PROCEDURE — 99213 OFFICE O/P EST LOW 20 MIN: CPT | Mod: PBBFAC,PO | Performed by: STUDENT IN AN ORGANIZED HEALTH CARE EDUCATION/TRAINING PROGRAM

## 2022-05-18 PROCEDURE — 3066F NEPHROPATHY DOC TX: CPT | Mod: CPTII,,, | Performed by: STUDENT IN AN ORGANIZED HEALTH CARE EDUCATION/TRAINING PROGRAM

## 2022-05-18 PROCEDURE — 3061F NEG MICROALBUMINURIA REV: CPT | Mod: CPTII,,, | Performed by: STUDENT IN AN ORGANIZED HEALTH CARE EDUCATION/TRAINING PROGRAM

## 2022-05-18 PROCEDURE — 1160F RVW MEDS BY RX/DR IN RCRD: CPT | Mod: CPTII,,, | Performed by: STUDENT IN AN ORGANIZED HEALTH CARE EDUCATION/TRAINING PROGRAM

## 2022-05-18 PROCEDURE — 99999 PR PBB SHADOW E&M-EST. PATIENT-LVL III: CPT | Mod: PBBFAC,,, | Performed by: STUDENT IN AN ORGANIZED HEALTH CARE EDUCATION/TRAINING PROGRAM

## 2022-05-18 PROCEDURE — 1159F MED LIST DOCD IN RCRD: CPT | Mod: CPTII,,, | Performed by: STUDENT IN AN ORGANIZED HEALTH CARE EDUCATION/TRAINING PROGRAM

## 2022-05-18 PROCEDURE — 3061F PR NEG MICROALBUMINURIA RESULT DOCUMENTED/REVIEW: ICD-10-PCS | Mod: CPTII,,, | Performed by: STUDENT IN AN ORGANIZED HEALTH CARE EDUCATION/TRAINING PROGRAM

## 2022-05-18 PROCEDURE — 3044F PR MOST RECENT HEMOGLOBIN A1C LEVEL <7.0%: ICD-10-PCS | Mod: CPTII,,, | Performed by: STUDENT IN AN ORGANIZED HEALTH CARE EDUCATION/TRAINING PROGRAM

## 2022-05-18 PROCEDURE — 3044F HG A1C LEVEL LT 7.0%: CPT | Mod: CPTII,,, | Performed by: STUDENT IN AN ORGANIZED HEALTH CARE EDUCATION/TRAINING PROGRAM

## 2022-05-18 PROCEDURE — 99213 OFFICE O/P EST LOW 20 MIN: CPT | Mod: S$PBB,,, | Performed by: STUDENT IN AN ORGANIZED HEALTH CARE EDUCATION/TRAINING PROGRAM

## 2022-05-18 PROCEDURE — 99999 PR PBB SHADOW E&M-EST. PATIENT-LVL III: ICD-10-PCS | Mod: PBBFAC,,, | Performed by: STUDENT IN AN ORGANIZED HEALTH CARE EDUCATION/TRAINING PROGRAM

## 2022-05-18 PROCEDURE — 99213 PR OFFICE/OUTPT VISIT, EST, LEVL III, 20-29 MIN: ICD-10-PCS | Mod: S$PBB,,, | Performed by: STUDENT IN AN ORGANIZED HEALTH CARE EDUCATION/TRAINING PROGRAM

## 2022-05-18 PROCEDURE — 3066F PR DOCUMENTATION OF TREATMENT FOR NEPHROPATHY: ICD-10-PCS | Mod: CPTII,,, | Performed by: STUDENT IN AN ORGANIZED HEALTH CARE EDUCATION/TRAINING PROGRAM

## 2022-05-18 NOTE — PROGRESS NOTES
Subjective:       Patient ID:  Moris Alfaro is a 42 y.o. female who presents for   Chief Complaint   Patient presents with    Acne     LOV: 4/13/22    Patient here today for follow up on Accutane, s/p month 6.     (mon 1 x 40, mon 2 x 40, month 3 x 60mg, month 4 x 60mg, Month 5 x 60mg, month 6 x 60mg).  She still has 2 weeks of medications as she picked it up a little late.   Her skin has been completely clear for the past month.   Lips are dry. No changes in mood.   Has been watching her diet but did not start taking fish oil.       Had a hysterectomy        Review of Systems   HENT: Negative for nosebleeds and headaches.    Gastrointestinal: Negative for nausea, vomiting, abdominal pain and diarrhea.   Musculoskeletal: Negative for myalgias and arthralgias.   Skin: Positive for dry skin and dry lips. Negative for itching, rash, sun sensitivity, daily sunscreen use, activity-related sunscreen use and recent sunburn.   Neurological: Negative for headaches.   Psychiatric/Behavioral: Negative for depressed mood.        Objective:    Physical Exam   Constitutional: She appears well-developed and well-nourished. No distress.   Neurological: She is alert and oriented to person, place, and time. She is not disoriented.   Psychiatric: She has a normal mood and affect.   Skin:   Areas Examined (abnormalities noted in diagram):   Head / Face Inspection Performed  Neck Inspection Performed                   Diagram Legend     Erythematous scaling macule/papule c/w actinic keratosis       Vascular papule c/w angioma      Pigmented verrucoid papule/plaque c/w seborrheic keratosis      Yellow umbilicated papule c/w sebaceous hyperplasia      Irregularly shaped tan macule c/w lentigo     1-2 mm smooth white papules consistent with Milia      Movable subcutaneous cyst with punctum c/w epidermal inclusion cyst      Subcutaneous movable cyst c/w pilar cyst      Firm pink to brown papule c/w dermatofibroma      Pedunculated  fleshy papule(s) c/w skin tag(s)      Evenly pigmented macule c/w junctional nevus     Mildly variegated pigmented, slightly irregular-bordered macule c/w mildly atypical nevus      Flesh colored to evenly pigmented papule c/w intradermal nevus       Pink pearly papule/plaque c/w basal cell carcinoma      Erythematous hyperkeratotic cursted plaque c/w SCC      Surgical scar with no sign of skin cancer recurrence      Open and closed comedones      Inflammatory papules and pustules      Verrucoid papule consistent consistent with wart     Erythematous eczematous patches and plaques     Dystrophic onycholytic nail with subungual debris c/w onychomycosis     Umbilicated papule    Erythematous-base heme-crusted tan verrucoid plaque consistent with inflamed seborrheic keratosis     Erythematous Silvery Scaling Plaque c/w Psoriasis     See annotation      Assessment / Plan:        Dyslipidemia  -     Lipid Panel; Future; Expected date: 05/18/2022  -     Comprehensive Metabolic Panel; Future; Expected date: 05/18/2022    Acne vulgaris s/p month 6- improved- clear today and no recent breakouts.  Total dose to date 30 x (40+ 40 + 60+ 60)= 9600 mg ( complete final 2 weeks)  Goal dose 125-200 x 69.5 kg  120- 150mg/kg= 8304- 10,425mg  ipledge # on file  email address onfile  Contraception methods              1- HYSTERECTOMY              2-  Discussed risks and benefits of Isotretinoin including but not limited to dry eyes, dry skin, and dry lips; headaches; nosebleeds; muscle aches; joint aches; elevated liver functions; elevated cholesterol or triglycerides; depression; diarrhea/stomach cramping (inflammatory bowel disease); increased sun sensitivity; birth defects. No laser while on Isotretinoin or for one month after completion of Isotretinoin course. No waxing and no donating blood while on Isotretinoin or for one month after completion of Isotretinoin course.   ok to use triple cream   Can add tretinoin in the future if  she desires    Dyslipidemia  - total cholesterol, ldl, triglycerides elevated in march  - recommend rechecking in 6 weeks- at that point she will be off the isotretinoin for 4 weeks             No follow-ups on file.

## 2022-07-11 ENCOUNTER — HOSPITAL ENCOUNTER (OUTPATIENT)
Dept: RADIOLOGY | Facility: HOSPITAL | Age: 42
Discharge: HOME | End: 2022-07-11
Attending: SPECIALIST
Payer: MEDICAID

## 2022-07-11 ENCOUNTER — OFFICE VISIT (OUTPATIENT)
Dept: OBSTETRICS AND GYNECOLOGY | Facility: CLINIC | Age: 42
End: 2022-07-11
Payer: MEDICAID

## 2022-07-11 VITALS
WEIGHT: 150.38 LBS | SYSTOLIC BLOOD PRESSURE: 108 MMHG | BODY MASS INDEX: 25.67 KG/M2 | DIASTOLIC BLOOD PRESSURE: 70 MMHG | HEIGHT: 64 IN

## 2022-07-11 DIAGNOSIS — Z12.31 VISIT FOR SCREENING MAMMOGRAM: ICD-10-CM

## 2022-07-11 DIAGNOSIS — Z11.3 SCREEN FOR STD (SEXUALLY TRANSMITTED DISEASE): ICD-10-CM

## 2022-07-11 DIAGNOSIS — Z12.31 VISIT FOR SCREENING MAMMOGRAM: Primary | ICD-10-CM

## 2022-07-11 PROCEDURE — 3078F DIAST BP <80 MM HG: CPT | Mod: CPTII,,, | Performed by: SPECIALIST

## 2022-07-11 PROCEDURE — 99213 PR OFFICE/OUTPT VISIT, EST, LEVL III, 20-29 MIN: ICD-10-PCS | Mod: S$PBB,,, | Performed by: SPECIALIST

## 2022-07-11 PROCEDURE — 77067 SCR MAMMO BI INCL CAD: CPT | Mod: 26,,, | Performed by: RADIOLOGY

## 2022-07-11 PROCEDURE — 3066F NEPHROPATHY DOC TX: CPT | Mod: CPTII,,, | Performed by: SPECIALIST

## 2022-07-11 PROCEDURE — 3044F HG A1C LEVEL LT 7.0%: CPT | Mod: CPTII,,, | Performed by: SPECIALIST

## 2022-07-11 PROCEDURE — 3008F PR BODY MASS INDEX (BMI) DOCUMENTED: ICD-10-PCS | Mod: CPTII,,, | Performed by: SPECIALIST

## 2022-07-11 PROCEDURE — 77063 BREAST TOMOSYNTHESIS BI: CPT | Mod: TC,PN

## 2022-07-11 PROCEDURE — 3008F BODY MASS INDEX DOCD: CPT | Mod: CPTII,,, | Performed by: SPECIALIST

## 2022-07-11 PROCEDURE — 99214 OFFICE O/P EST MOD 30 MIN: CPT | Mod: PBBFAC,PN | Performed by: SPECIALIST

## 2022-07-11 PROCEDURE — 3061F NEG MICROALBUMINURIA REV: CPT | Mod: CPTII,,, | Performed by: SPECIALIST

## 2022-07-11 PROCEDURE — 3074F PR MOST RECENT SYSTOLIC BLOOD PRESSURE < 130 MM HG: ICD-10-PCS | Mod: CPTII,,, | Performed by: SPECIALIST

## 2022-07-11 PROCEDURE — 1159F MED LIST DOCD IN RCRD: CPT | Mod: CPTII,,, | Performed by: SPECIALIST

## 2022-07-11 PROCEDURE — 77063 MAMMO DIGITAL SCREENING BILAT WITH TOMO: ICD-10-PCS | Mod: 26,,, | Performed by: RADIOLOGY

## 2022-07-11 PROCEDURE — 99999 PR PBB SHADOW E&M-EST. PATIENT-LVL IV: ICD-10-PCS | Mod: PBBFAC,,, | Performed by: SPECIALIST

## 2022-07-11 PROCEDURE — 77067 MAMMO DIGITAL SCREENING BILAT WITH TOMO: ICD-10-PCS | Mod: 26,,, | Performed by: RADIOLOGY

## 2022-07-11 PROCEDURE — 77063 BREAST TOMOSYNTHESIS BI: CPT | Mod: 26,,, | Performed by: RADIOLOGY

## 2022-07-11 PROCEDURE — 3066F PR DOCUMENTATION OF TREATMENT FOR NEPHROPATHY: ICD-10-PCS | Mod: CPTII,,, | Performed by: SPECIALIST

## 2022-07-11 PROCEDURE — 99999 PR PBB SHADOW E&M-EST. PATIENT-LVL IV: CPT | Mod: PBBFAC,,, | Performed by: SPECIALIST

## 2022-07-11 PROCEDURE — 1159F PR MEDICATION LIST DOCUMENTED IN MEDICAL RECORD: ICD-10-PCS | Mod: CPTII,,, | Performed by: SPECIALIST

## 2022-07-11 PROCEDURE — 99213 OFFICE O/P EST LOW 20 MIN: CPT | Mod: S$PBB,,, | Performed by: SPECIALIST

## 2022-07-11 PROCEDURE — 87491 CHLMYD TRACH DNA AMP PROBE: CPT | Performed by: SPECIALIST

## 2022-07-11 PROCEDURE — 3078F PR MOST RECENT DIASTOLIC BLOOD PRESSURE < 80 MM HG: ICD-10-PCS | Mod: CPTII,,, | Performed by: SPECIALIST

## 2022-07-11 PROCEDURE — 3061F PR NEG MICROALBUMINURIA RESULT DOCUMENTED/REVIEW: ICD-10-PCS | Mod: CPTII,,, | Performed by: SPECIALIST

## 2022-07-11 PROCEDURE — 3044F PR MOST RECENT HEMOGLOBIN A1C LEVEL <7.0%: ICD-10-PCS | Mod: CPTII,,, | Performed by: SPECIALIST

## 2022-07-11 PROCEDURE — 3074F SYST BP LT 130 MM HG: CPT | Mod: CPTII,,, | Performed by: SPECIALIST

## 2022-07-11 PROCEDURE — 87591 N.GONORRHOEAE DNA AMP PROB: CPT | Performed by: SPECIALIST

## 2022-07-11 RX ORDER — METRONIDAZOLE 250 MG/1
250 TABLET ORAL 3 TIMES DAILY
Qty: 21 TABLET | Refills: 2 | Status: SHIPPED | OUTPATIENT
Start: 2022-07-11 | End: 2022-07-18

## 2022-07-11 NOTE — PROGRESS NOTES
43 yo BF presents for eval longstanding intermittent bilateral breast tenderness. 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    Thyroid disease     Toe fracture        Past Surgical History:   Procedure Laterality Date    CYSTOSCOPY N/A 3/10/2021    Procedure: CYSTOSCOPY;  Surgeon: Wanda Beaver MD;  Location: [facility] OR;  Service: Urology;  Laterality: N/A;    dx lap      ecsi      HYSTERECTOMY  2007    CORINA due to DUB,  ovaries intact    lumbar facet injection      MI EXPLORATORY OF ABDOMEN      RHIZOTOMY      TUBAL LIGATION         Family History   Problem Relation Age of Onset    Thyroid disease Mother     Heart disease Father     Hypertension Father     Pancreatitis Father     Diabetes Father     Stomach cancer Paternal Aunt     Stomach cancer Paternal Uncle     Cancer Paternal Uncle         lung cancer    Melanoma Neg Hx     Psoriasis Neg Hx     Lupus Neg Hx     Eczema Neg Hx     Breast cancer Neg Hx     Ovarian cancer Neg Hx        Social History     Socioeconomic History    Marital status:    Tobacco Use    Smoking status: Never Smoker    Smokeless tobacco: Never Used   Substance and Sexual Activity    Alcohol use: Yes     Comment: OCCASIONALLY    Drug use: No    Sexual activity: Not Currently     Partners: Male     Birth control/protection: None, See Surgical Hx       Current Outpatient  Medications   Medication Sig Dispense Refill    boric acid (BORIC ACID) vaginal suppository Place 1 each (650 mg total) vaginally once daily. 10 suppository 2    boric acid (BORIC ACID) vaginal suppository Place 1 each (650 mg total) vaginally once daily. 10 suppository 1    cyclobenzaprine (FLEXERIL) 10 MG tablet TAKE 1 TABLET BY MOUTH TWICE DAILY AS NEEDED 60 tablet 3    ergocalciferol (ERGOCALCIFEROL) 50,000 unit Cap TAKE 1 CAPSULE BY MOUTH EVERY 7 DAYS 12 capsule 3    FEROSUL 325 mg (65 mg iron) Tab tablet TAKE 1 TABLET BY MOUTH EVERY DAY WITH BREAKFAST 90 tablet 3    gabapentin (NEURONTIN) 300 MG capsule Take 1 capsule (300 mg total) by mouth 2 (two) times daily. 60 capsule 3    gabapentin (NEURONTIN) 800 MG tablet TAKE 1 TABLET(800 MG) BY MOUTH TWICE DAILY 60 tablet 3    ISOtretinoin (ACCUTANE) 30 MG capsule Take 2 caps PO with dinner daily 60 capsule 0    ISOtretinoin (ACCUTANE) 30 MG capsule Take 1 capsule (30 mg total) by mouth 2 (two) times daily. 60 capsule 0    levothyroxine (SYNTHROID) 75 MCG tablet TAKE 1 TABLET BY MOUTH EVERY DAY 90 tablet 3    liothyronine (CYTOMEL) 5 MCG Tab TAKE 1 TABLET BY MOUTH EVERY DAY 90 tablet 3    ONETOUCH DELICA LANCETS 33 gauge Misc TEST EVERY DAY AS DIRECTED 100 each 3    ONETOUCH VERIO Strp USE AS DIRECTED ONCE DAILY 100 strip 3    permethrin (ELIMITE) 5 % cream Apply to AA face 3 times a week at bedtime 60 g 1    propranoloL (INDERAL LA) 80 MG 24 hr capsule TK 1 C PO QD      topiramate (TOPAMAX) 50 MG tablet TAKE 1 TABLET BY MOUTH TWICE DAILY 60 tablet 11    traZODone (DESYREL) 50 MG tablet TAKE 1 TABLET(50 MG) BY MOUTH EVERY EVENING 30 tablet 3    triamcinolone acetonide 0.025% (KENALOG) 0.025 % cream Apply topically 2 (two) times daily. 80 g 0    triamcinolone acetonide 0.025% (KENALOG) 0.025 % Oint Thin film to lips and eczematous plaques BID PRN flare 80 g 1    triamcinolone acetonide 0.1% (KENALOG) 0.1 % cream AAA bid 60 g 3    metroNIDAZOLE  (FLAGYL) 250 MG tablet Take 1 tablet (250 mg total) by mouth 3 (three) times daily. for 7 days 21 tablet 2     No current facility-administered medications for this visit.       Review of patient's allergies indicates:  No Known Allergies    Review of System:   General: no chills, fever, night sweats, weight gain or weight loss  Psychological: no depression or suicidal ideation  Breasts: Breast tenderness  Respiratory: no cough, shortness of breath, or wheezing  Cardiovascular: no chest pain or dyspnea on exertion  Gastrointestinal: no abdominal pain, change in bowel habits, or black or bloody stools  Genito-Urinary: no incontinence, urinary frequency/urgency or , pelvic pain or abnormal vaginal bleeding.  Musculoskeletal: no gait disturbance or muscular weakness    PE:   APPEARANCE: Well nourished, well developed, in no acute distress.  NECK: Neck symmetric without masses or thyromegaly.  NODES: No inguinal lymph node enlargement.  ABDOMEN: Soft. No tenderness or masses. No hepatosplenomegaly. No hernias.  BREASTS: Symmetrical, no skin changes or visible lesions. No palpable masses, nipple discharge or adenopathy bilaterally.  PELVIC: Normal external female genitalia without lesions. Normal hair distribution. Adequate perineal body, normal urethral meatus. Vagina moist and well rugated without lesions. Frothy grey d/c with pos whiff. No significant cystocele or rectocele. Uterus and cervix surgically absent. Bimanual exam revealed no masses, tenderness or abnormality.    NOTE  NURSING PERSONAL PRESENT FOR ENTIRE PHYSICAL EXAM    Discussed clinical fibrocystic breast and discussed supportive care  NSAIDS  Sports bra  Discussed clinical BV and will treat  RTO prn

## 2022-07-18 LAB
C TRACH DNA SPEC QL NAA+PROBE: NOT DETECTED
N GONORRHOEA DNA SPEC QL NAA+PROBE: NOT DETECTED

## 2022-08-18 ENCOUNTER — OFFICE VISIT (OUTPATIENT)
Dept: RHEUMATOLOGY | Facility: CLINIC | Age: 42
End: 2022-08-18
Payer: MEDICAID

## 2022-08-18 VITALS — SYSTOLIC BLOOD PRESSURE: 122 MMHG | WEIGHT: 153 LBS | DIASTOLIC BLOOD PRESSURE: 77 MMHG | BODY MASS INDEX: 26.26 KG/M2

## 2022-08-18 DIAGNOSIS — M79.7 FIBROMYALGIA: Primary | ICD-10-CM

## 2022-08-18 PROCEDURE — 3008F BODY MASS INDEX DOCD: CPT | Mod: CPTII,S$GLB,, | Performed by: INTERNAL MEDICINE

## 2022-08-18 PROCEDURE — 99213 OFFICE O/P EST LOW 20 MIN: CPT | Mod: S$GLB,,, | Performed by: INTERNAL MEDICINE

## 2022-08-18 PROCEDURE — 3066F PR DOCUMENTATION OF TREATMENT FOR NEPHROPATHY: ICD-10-PCS | Mod: CPTII,S$GLB,, | Performed by: INTERNAL MEDICINE

## 2022-08-18 PROCEDURE — 1159F PR MEDICATION LIST DOCUMENTED IN MEDICAL RECORD: ICD-10-PCS | Mod: CPTII,S$GLB,, | Performed by: INTERNAL MEDICINE

## 2022-08-18 PROCEDURE — 3061F PR NEG MICROALBUMINURIA RESULT DOCUMENTED/REVIEW: ICD-10-PCS | Mod: CPTII,S$GLB,, | Performed by: INTERNAL MEDICINE

## 2022-08-18 PROCEDURE — 3044F PR MOST RECENT HEMOGLOBIN A1C LEVEL <7.0%: ICD-10-PCS | Mod: CPTII,S$GLB,, | Performed by: INTERNAL MEDICINE

## 2022-08-18 PROCEDURE — 3066F NEPHROPATHY DOC TX: CPT | Mod: CPTII,S$GLB,, | Performed by: INTERNAL MEDICINE

## 2022-08-18 PROCEDURE — 99213 PR OFFICE/OUTPT VISIT, EST, LEVL III, 20-29 MIN: ICD-10-PCS | Mod: S$GLB,,, | Performed by: INTERNAL MEDICINE

## 2022-08-18 PROCEDURE — 3078F DIAST BP <80 MM HG: CPT | Mod: CPTII,S$GLB,, | Performed by: INTERNAL MEDICINE

## 2022-08-18 PROCEDURE — 3044F HG A1C LEVEL LT 7.0%: CPT | Mod: CPTII,S$GLB,, | Performed by: INTERNAL MEDICINE

## 2022-08-18 PROCEDURE — 3074F PR MOST RECENT SYSTOLIC BLOOD PRESSURE < 130 MM HG: ICD-10-PCS | Mod: CPTII,S$GLB,, | Performed by: INTERNAL MEDICINE

## 2022-08-18 PROCEDURE — 3061F NEG MICROALBUMINURIA REV: CPT | Mod: CPTII,S$GLB,, | Performed by: INTERNAL MEDICINE

## 2022-08-18 PROCEDURE — 3008F PR BODY MASS INDEX (BMI) DOCUMENTED: ICD-10-PCS | Mod: CPTII,S$GLB,, | Performed by: INTERNAL MEDICINE

## 2022-08-18 PROCEDURE — 3078F PR MOST RECENT DIASTOLIC BLOOD PRESSURE < 80 MM HG: ICD-10-PCS | Mod: CPTII,S$GLB,, | Performed by: INTERNAL MEDICINE

## 2022-08-18 PROCEDURE — 1159F MED LIST DOCD IN RCRD: CPT | Mod: CPTII,S$GLB,, | Performed by: INTERNAL MEDICINE

## 2022-08-18 PROCEDURE — 3074F SYST BP LT 130 MM HG: CPT | Mod: CPTII,S$GLB,, | Performed by: INTERNAL MEDICINE

## 2022-08-18 NOTE — PROGRESS NOTES
[facility] RHEUMATOLOGY            PROGRESS NOTE      Subjective:       Patient ID:   NAME: Moris Alfaro : 1980     42 y.o. female    Referring Doc: No ref. provider found  Other Physicians:    Chief Complaint:  Fibromyalgia      History of Present Illness:     Patient returns today for a regularly scheduled follow-up visit for osteoarthritis and fibromyalgia    The patient is doing fairly okay overall.  Occasional arthralgias and myalgias; no joint swelling.  Arthralgias in right shoulder.  No history of trauma.  No paresthesias  No GI complaints except constipation.    ROS:   GEN:  No  fever, night sweats . weight is stable   + fatigue  SKIN: no rashes, no bruising, no ulcerations, no Raynaud's  HEENT: no HA's, No visual changes, no mucosal ulcers, no sicca symptoms,  CV:   no CP, SOB, PND, ZUÑIGA, no orthopnea, no palpitations  PULM: normal with no SOB, cough, hemoptysis, sputum or pleuritic pain  GI:  no abdominal pain, nausea, vomiting, constipation, diarrhea, melanotic stools, BRBPR, hematemesis, no dysphagia  NEURO: no paresthesias, headaches, visual disturbances, muscle weakness  MUSCULOSKELETAL:no joint swelling, prolonged AM stiffness, no back pain, no muscle pain  Allergies:  Review of patient's allergies indicates:  No Known Allergies    Medications:    Current Outpatient Medications:     boric acid (BORIC ACID) vaginal suppository, Place 1 each (650 mg total) vaginally once daily., Disp: 10 suppository, Rfl: 2    cyclobenzaprine (FLEXERIL) 10 MG tablet, TAKE 1 TABLET BY MOUTH TWICE DAILY AS NEEDED, Disp: 60 tablet, Rfl: 3    ergocalciferol (ERGOCALCIFEROL) 50,000 unit Cap, TAKE 1 CAPSULE BY MOUTH EVERY 7 DAYS, Disp: 12 capsule, Rfl: 3    FEROSUL 325 mg (65 mg iron) Tab tablet, TAKE 1 TABLET BY MOUTH EVERY DAY WITH BREAKFAST, Disp: 90 tablet, Rfl: 3    gabapentin (NEURONTIN) 300 MG capsule, Take 1 capsule (300 mg total) by mouth 2 (two) times daily., Disp: 60 capsule, Rfl: 3     gabapentin (NEURONTIN) 800 MG tablet, TAKE 1 TABLET(800 MG) BY MOUTH TWICE DAILY, Disp: 60 tablet, Rfl: 3    levothyroxine (SYNTHROID) 75 MCG tablet, TAKE 1 TABLET BY MOUTH EVERY DAY, Disp: 90 tablet, Rfl: 3    liothyronine (CYTOMEL) 5 MCG Tab, TAKE 1 TABLET BY MOUTH EVERY DAY, Disp: 90 tablet, Rfl: 3    ONETOUCH DELICA LANCETS 33 gauge Misc, TEST EVERY DAY AS DIRECTED, Disp: 100 each, Rfl: 3    ONETOUCH VERIO Strp, USE AS DIRECTED ONCE DAILY, Disp: 100 strip, Rfl: 3    permethrin (ELIMITE) 5 % cream, Apply to AA face 3 times a week at bedtime, Disp: 60 g, Rfl: 1    propranoloL (INDERAL LA) 80 MG 24 hr capsule, TK 1 C PO QD, Disp: , Rfl:     topiramate (TOPAMAX) 50 MG tablet, TAKE 1 TABLET BY MOUTH TWICE DAILY, Disp: 60 tablet, Rfl: 11    traZODone (DESYREL) 50 MG tablet, TAKE 1 TABLET(50 MG) BY MOUTH EVERY EVENING, Disp: 30 tablet, Rfl: 3    triamcinolone acetonide 0.025% (KENALOG) 0.025 % cream, Apply topically 2 (two) times daily., Disp: 80 g, Rfl: 0    triamcinolone acetonide 0.025% (KENALOG) 0.025 % Oint, Thin film to lips and eczematous plaques BID PRN flare, Disp: 80 g, Rfl: 1    triamcinolone acetonide 0.1% (KENALOG) 0.1 % cream, AAA bid, Disp: 60 g, Rfl: 3    boric acid (BORIC ACID) vaginal suppository, Place 1 each (650 mg total) vaginally once daily., Disp: 10 suppository, Rfl: 1    ISOtretinoin (ACCUTANE) 30 MG capsule, Take 2 caps PO with dinner daily, Disp: 60 capsule, Rfl: 0    ISOtretinoin (ACCUTANE) 30 MG capsule, Take 1 capsule (30 mg total) by mouth 2 (two) times daily., Disp: 60 capsule, Rfl: 0    PMHx/PSHx Updates:        Objective:     Vitals:  Blood pressure 122/77, weight 69.4 kg (153 lb).    Physical Examination:   GEN: no apparent distress, comfortable; AAOx3  SKIN: no rashes,no ulceration, no Raynaud's, no petechiae, no SQ nodules,  HEAD: normal  EYES: no pallor, no icterusNECK: no masses, thyroid normal, trachea midline, no LAD/LN's, supple  CV: RRR with no murmur; l S1 and  S2 reg. ,no gallop no rubs,   CHEST: Normal respiratory effort; CTAB; normal breath sounds; no wheeze or crackles  MUSC/Skeletal: ROM normal; no crepitus; joints without synovitis,  no deformities  No joint swelling or tenderness of PIP, MCP, wrist, elbow, shoulder, or knee joints  EXTREM: no clubbing, cyanosis, no edema,normal  pulses   NEURO: grossly intact; motor WNL; AAOx3  PSYCH: normal mood, affect and behavior  LYMPH: normal cervical, supraclavicular          Labs:   Lab Results   Component Value Date    WBC 7.73 02/18/2022    HGB 12.0 02/18/2022    HCT 39.7 02/18/2022    MCV 93 02/18/2022     02/18/2022    CMP  @LASTLAB(NA,K,CL,CO2,GLU,BUN,Creatinine,Calcium,PROT,Albumin,Bilitot,Alkphos,AST,ALT,CRP,ESR,RF,CCP,PEDRO,SSA,CPK,uric acid) )@  I have reviewed all available lab results and radiology reports.    Radiology/Diagnostic Studies:        Assessment/Plan:   (1) 42 y.o. female with diagnosis of fibromyalgia and osteoarthritis stable    Advised to see primary care physician for follow-up after December            Discussion:     I have explained all of the above in detail and the patient understands all of the current recommendation(s). I have answered all questions to the best of my ability and to their complete satisfaction.       The patient is to continue with the current management plan         RTC in         Electronically signed by Keanu Bravo MD    Patient notified that this office will be closing December 22, 2022. They should begin looking for another rheumatologist as soon as possible.  A list with the names and contact details of rheumatologists in the surrounding area was provided.    Answers for HPI/ROS submitted by the patient on 8/16/2022  fever: No  eye redness: No  mouth sores: No  headaches: No  shortness of breath: No  chest pain: No  trouble swallowing: No  diarrhea: No  constipation: Yes  unexpected weight change: No  genital sore: No  dysuria: No  During the last 3 days,  have you had a skin rash?: Yes  Bruises or bleeds easily: No  cough: No

## 2022-08-23 ENCOUNTER — LAB VISIT (OUTPATIENT)
Dept: LAB | Facility: HOSPITAL | Age: 42
End: 2022-08-23
Attending: INTERNAL MEDICINE
Payer: MEDICAID

## 2022-08-23 ENCOUNTER — OFFICE VISIT (OUTPATIENT)
Dept: ENDOCRINOLOGY | Facility: CLINIC | Age: 42
End: 2022-08-23
Payer: MEDICAID

## 2022-08-23 VITALS
WEIGHT: 152.88 LBS | OXYGEN SATURATION: 94 % | BODY MASS INDEX: 26.1 KG/M2 | TEMPERATURE: 98 F | DIASTOLIC BLOOD PRESSURE: 74 MMHG | SYSTOLIC BLOOD PRESSURE: 110 MMHG | HEIGHT: 64 IN | HEART RATE: 76 BPM

## 2022-08-23 DIAGNOSIS — E06.9 THYROIDITIS: ICD-10-CM

## 2022-08-23 DIAGNOSIS — K21.9 GERD WITHOUT ESOPHAGITIS: ICD-10-CM

## 2022-08-23 DIAGNOSIS — F32.89 OTHER DEPRESSION: ICD-10-CM

## 2022-08-23 DIAGNOSIS — E78.00 HYPERCHOLESTEROLEMIA: ICD-10-CM

## 2022-08-23 DIAGNOSIS — F41.9 ANXIETY: ICD-10-CM

## 2022-08-23 DIAGNOSIS — E03.9 HYPOTHYROIDISM (ACQUIRED): ICD-10-CM

## 2022-08-23 DIAGNOSIS — E78.5 HYPERLIPIDEMIA, UNSPECIFIED HYPERLIPIDEMIA TYPE: ICD-10-CM

## 2022-08-23 DIAGNOSIS — G43.009 MIGRAINE WITHOUT AURA AND WITHOUT STATUS MIGRAINOSUS, NOT INTRACTABLE: ICD-10-CM

## 2022-08-23 DIAGNOSIS — E04.9 GOITER: ICD-10-CM

## 2022-08-23 DIAGNOSIS — F44.5 PSEUDOSEIZURES: ICD-10-CM

## 2022-08-23 DIAGNOSIS — F90.0 ATTENTION DEFICIT HYPERACTIVITY DISORDER (ADHD), PREDOMINANTLY INATTENTIVE TYPE: ICD-10-CM

## 2022-08-23 DIAGNOSIS — E11.65 TYPE 2 DIABETES MELLITUS WITH HYPERGLYCEMIA, WITHOUT LONG-TERM CURRENT USE OF INSULIN: Primary | ICD-10-CM

## 2022-08-23 DIAGNOSIS — Z90.710 S/P HYSTERECTOMY: ICD-10-CM

## 2022-08-23 DIAGNOSIS — M79.7 FIBROMYALGIA: ICD-10-CM

## 2022-08-23 DIAGNOSIS — E11.65 TYPE 2 DIABETES MELLITUS WITH HYPERGLYCEMIA, WITHOUT LONG-TERM CURRENT USE OF INSULIN: ICD-10-CM

## 2022-08-23 DIAGNOSIS — M51.36 DDD (DEGENERATIVE DISC DISEASE), LUMBAR: ICD-10-CM

## 2022-08-23 DIAGNOSIS — R79.89 ABNORMAL THYROID BLOOD TEST: ICD-10-CM

## 2022-08-23 LAB
ESTIMATED AVG GLUCOSE: 100 MG/DL (ref 68–131)
HBA1C MFR BLD: 5.1 % (ref 4–5.6)
T3 SERPL-MCNC: 103 NG/DL (ref 60–180)
T4 FREE SERPL-MCNC: 0.82 NG/DL (ref 0.71–1.51)
TSH SERPL DL<=0.005 MIU/L-ACNC: 0.8 UIU/ML (ref 0.4–4)

## 2022-08-23 PROCEDURE — 99213 OFFICE O/P EST LOW 20 MIN: CPT | Mod: PBBFAC,PO | Performed by: INTERNAL MEDICINE

## 2022-08-23 PROCEDURE — 1160F PR REVIEW ALL MEDS BY PRESCRIBER/CLIN PHARMACIST DOCUMENTED: ICD-10-PCS | Mod: CPTII,,, | Performed by: INTERNAL MEDICINE

## 2022-08-23 PROCEDURE — 1160F RVW MEDS BY RX/DR IN RCRD: CPT | Mod: CPTII,,, | Performed by: INTERNAL MEDICINE

## 2022-08-23 PROCEDURE — 84378 SUGARS SINGLE QUANT: CPT | Performed by: INTERNAL MEDICINE

## 2022-08-23 PROCEDURE — 3008F BODY MASS INDEX DOCD: CPT | Mod: CPTII,,, | Performed by: INTERNAL MEDICINE

## 2022-08-23 PROCEDURE — 3044F HG A1C LEVEL LT 7.0%: CPT | Mod: CPTII,,, | Performed by: INTERNAL MEDICINE

## 2022-08-23 PROCEDURE — 84480 ASSAY TRIIODOTHYRONINE (T3): CPT | Performed by: INTERNAL MEDICINE

## 2022-08-23 PROCEDURE — 3066F PR DOCUMENTATION OF TREATMENT FOR NEPHROPATHY: ICD-10-PCS | Mod: CPTII,,, | Performed by: INTERNAL MEDICINE

## 2022-08-23 PROCEDURE — 3074F PR MOST RECENT SYSTOLIC BLOOD PRESSURE < 130 MM HG: ICD-10-PCS | Mod: CPTII,,, | Performed by: INTERNAL MEDICINE

## 2022-08-23 PROCEDURE — 3044F PR MOST RECENT HEMOGLOBIN A1C LEVEL <7.0%: ICD-10-PCS | Mod: CPTII,,, | Performed by: INTERNAL MEDICINE

## 2022-08-23 PROCEDURE — 3078F DIAST BP <80 MM HG: CPT | Mod: CPTII,,, | Performed by: INTERNAL MEDICINE

## 2022-08-23 PROCEDURE — 3074F SYST BP LT 130 MM HG: CPT | Mod: CPTII,,, | Performed by: INTERNAL MEDICINE

## 2022-08-23 PROCEDURE — 99999 PR PBB SHADOW E&M-EST. PATIENT-LVL III: ICD-10-PCS | Mod: PBBFAC,,, | Performed by: INTERNAL MEDICINE

## 2022-08-23 PROCEDURE — 99214 OFFICE O/P EST MOD 30 MIN: CPT | Mod: S$PBB,,, | Performed by: INTERNAL MEDICINE

## 2022-08-23 PROCEDURE — 1159F PR MEDICATION LIST DOCUMENTED IN MEDICAL RECORD: ICD-10-PCS | Mod: CPTII,,, | Performed by: INTERNAL MEDICINE

## 2022-08-23 PROCEDURE — 36415 COLL VENOUS BLD VENIPUNCTURE: CPT | Mod: PO | Performed by: INTERNAL MEDICINE

## 2022-08-23 PROCEDURE — 1159F MED LIST DOCD IN RCRD: CPT | Mod: CPTII,,, | Performed by: INTERNAL MEDICINE

## 2022-08-23 PROCEDURE — 3061F PR NEG MICROALBUMINURIA RESULT DOCUMENTED/REVIEW: ICD-10-PCS | Mod: CPTII,,, | Performed by: INTERNAL MEDICINE

## 2022-08-23 PROCEDURE — 83036 HEMOGLOBIN GLYCOSYLATED A1C: CPT | Mod: 59 | Performed by: INTERNAL MEDICINE

## 2022-08-23 PROCEDURE — 3078F PR MOST RECENT DIASTOLIC BLOOD PRESSURE < 80 MM HG: ICD-10-PCS | Mod: CPTII,,, | Performed by: INTERNAL MEDICINE

## 2022-08-23 PROCEDURE — 3008F PR BODY MASS INDEX (BMI) DOCUMENTED: ICD-10-PCS | Mod: CPTII,,, | Performed by: INTERNAL MEDICINE

## 2022-08-23 PROCEDURE — 84443 ASSAY THYROID STIM HORMONE: CPT | Performed by: INTERNAL MEDICINE

## 2022-08-23 PROCEDURE — 99214 PR OFFICE/OUTPT VISIT, EST, LEVL IV, 30-39 MIN: ICD-10-PCS | Mod: S$PBB,,, | Performed by: INTERNAL MEDICINE

## 2022-08-23 PROCEDURE — 3066F NEPHROPATHY DOC TX: CPT | Mod: CPTII,,, | Performed by: INTERNAL MEDICINE

## 2022-08-23 PROCEDURE — 3061F NEG MICROALBUMINURIA REV: CPT | Mod: CPTII,,, | Performed by: INTERNAL MEDICINE

## 2022-08-23 PROCEDURE — 99999 PR PBB SHADOW E&M-EST. PATIENT-LVL III: CPT | Mod: PBBFAC,,, | Performed by: INTERNAL MEDICINE

## 2022-08-23 PROCEDURE — 84439 ASSAY OF FREE THYROXINE: CPT | Performed by: INTERNAL MEDICINE

## 2022-08-23 PROCEDURE — 82985 ASSAY OF GLYCATED PROTEIN: CPT | Performed by: INTERNAL MEDICINE

## 2022-08-23 PROCEDURE — 84432 ASSAY OF THYROGLOBULIN: CPT | Performed by: INTERNAL MEDICINE

## 2022-08-23 NOTE — PROGRESS NOTES
Subjective:      Patient ID: Moris Alfaro is a 42 y.o. female.    Chief Complaint:      Hypothyroidism     Patient is a 42 yr old lady with hypothyroidism on thyroid hormone repletion and type 2 diabetes seen today.    History of Present Illness      The patient, Ms Roper is a 42 yr old lady seen in [facility] on account of subclinical hypothyroidism due to Hashimoto's thyroiditis. She also has a background history of chronic migraines, obesity, early onset type 2 diabetes,  anxiety neurosis, conversion disorder and pseudoseizures in the past in addition to GERD.    Patient also has a poorly defined background past history of fibromyalgia and  had been commenced on thyroid hormone repletion with Lt4 @75mcg QD and cytomel 5mcg Qd.  She is presently diet controlled for type 2 diabetes. At the present time she is entirely diet controlled. She is presently not doing SMBGs at all.  Her most recent HBA1c from 02/22 was 5.1 reflecting very good glycemic control.  Patient complains of significant fatigue over the last few months. She has no problems currently with hot flashes but has been having some problems with cold sensitivity.  Patient does have intermittent palpitations.  She has not been checking her SMBGs frequently.  In addition she had a screening thyroid ultrasound (from 07/15) which was essentially -ve for any significant thyroid nodular disease.  Patient feels well. She is sleeping a little better now but continues to have episodic insomnia.  She is s/p CORINA for endometriosis but has one of her ovaries insitu. She has been having intermittent hot flashes and drenching sweats and also has some mouth dryness for which she takes regular free fluid. Her hormonal testing however has not showed her to be perimenopausal.  Patient is now s/p unilateral oophorectomy and unilateral fallopian tube removal.   Patient had her opthalmic screening done in Sept 2020  ([facility]) and this was normal with no evidence  "of retinopathy.  Patient does not receive flu vaccinations by choice. Patient has also never pneumovax again by choice.  The remains on topiramate 50mg BID. The associated migraine headaches are laregly well controlled.  She has no fresh complaints today, denies any local neck symptoms and has no vasomotor symptoms at the moment.    Review of Systems   Constitutional: Negative for diaphoresis, fatigue and unexpected weight change.   HENT: Negative for facial swelling, trouble swallowing and voice change.    Eyes: Negative for visual disturbance.   Respiratory: Negative for cough and shortness of breath.    Cardiovascular: Negative for chest pain, palpitations and leg swelling.   Gastrointestinal: Negative for abdominal pain, diarrhea, nausea and vomiting.   Endocrine: Negative for cold intolerance and polyuria.   Genitourinary: Negative for flank pain, frequency and menstrual problem (S/P CORINA and unilateral oophorectomy).   Musculoskeletal: Negative for arthralgias, back pain and myalgias.   Skin: Negative for color change, pallor and rash.   Neurological: Negative for dizziness, numbness and headaches.   Hematological: Does not bruise/bleed easily.   Psychiatric/Behavioral: Negative for confusion. The patient is not nervous/anxious.        Objective:    /74 (BP Location: Left arm, Patient Position: Sitting, BP Method: Medium (Manual))   Pulse 76   Temp 98 °F (36.7 °C) (Oral)   Ht 5' 4" (1.626 m)   Wt 69.3 kg (152 lb 14.2 oz)   SpO2 (!) 94%   BMI 26.24 kg/m²  Body surface area is 1.77 meters squared.           Physical Exam  Vitals reviewed.   Constitutional:       Appearance: She is not diaphoretic.      Comments: Pleasant young lady. Not pale, anicteric and afebrile. Well hydrated. Clinically comfortable. Not in any apparent distress. Acyanotic.   HENT:      Mouth/Throat:      Mouth: Mucous membranes are moist.   Eyes:      General: No scleral icterus.     Pupils: Pupils are equal, round, and reactive " to light.   Neck:      Thyroid: No thyroid mass, thyromegaly or thyroid tenderness.      Vascular: No carotid bruit.      Trachea: Trachea and phonation normal. No tracheal tenderness.   Cardiovascular:      Rate and Rhythm: Normal rate and regular rhythm.      Pulses: Normal pulses.      Heart sounds: Normal heart sounds. No murmur heard.  Pulmonary:      Effort: No respiratory distress.      Breath sounds: No stridor. No wheezing, rhonchi or rales.   Abdominal:      General: Abdomen is flat.      Tenderness: There is no abdominal tenderness.   Musculoskeletal:         General: No swelling.      Cervical back: Neck supple. No rigidity or tenderness.   Lymphadenopathy:      Cervical: No cervical adenopathy.   Neurological:      General: No focal deficit present.      Mental Status: She is alert and oriented to person, place, and time.      Cranial Nerves: No cranial nerve deficit.      Sensory: No sensory deficit.      Motor: No weakness.      Gait: Gait normal.   Psychiatric:         Mood and Affect: Mood normal.         Behavior: Behavior normal.         Thought Content: Thought content normal.         Judgment: Judgment normal.         Lab Review:      Latest Reference Range & Units 01/24/22 08:57 02/18/22 15:22 02/18/22 15:39 03/16/22 08:27 07/11/22 15:10 07/11/22 15:18   WBC 3.90 - 12.70 K/uL   7.73      RBC 4.00 - 5.40 M/uL   4.28      Hemoglobin 12.0 - 16.0 g/dL   12.0      Hematocrit 37.0 - 48.5 %   39.7      MCV 82 - 98 fL   93      MCH 27.0 - 31.0 pg   28.0      MCHC 32.0 - 36.0 g/dL   30.2 (L)      RDW 11.5 - 14.5 %   13.3      Platelets 150 - 450 K/uL   286      MPV 9.2 - 12.9 fL   10.7      Gran % 38.0 - 73.0 %   60.2      Lymph % 18.0 - 48.0 %   28.8      Mono % 4.0 - 15.0 %   8.3      Eosinophil % 0.0 - 8.0 %   1.2      Basophil % 0.0 - 1.9 %   0.3      Immature Granulocytes 0.0 - 0.5 %   1.2 (H)      Gran # (ANC) 1.8 - 7.7 K/uL   4.7      Lymph # 1.0 - 4.8 K/uL   2.2      Mono # 0.3 - 1.0 K/uL   0.6       Eos # 0.0 - 0.5 K/uL   0.1      Baso # 0.00 - 0.20 K/uL   0.02      Immature Grans (Abs) 0.00 - 0.04 K/uL   0.09 (H)      nRBC 0 /100 WBC   0      Differential Method    Automated      Sodium 136 - 145 mmol/L 139     134 (L)   Potassium 3.5 - 5.1 mmol/L 3.7     3.6   Chloride 95 - 110 mmol/L 107     104   CO2 23 - 29 mmol/L 23     24   Anion Gap 8 - 16 mmol/L 9     6 (L)   BUN 6 - 20 mg/dL 10     11   Creatinine 0.5 - 1.4 mg/dL 0.8     0.8   eGFR if non African American >60 mL/min/1.73 m^2 >60     >60.0   eGFR if African American >60 mL/min/1.73 m^2 >60     >60.0   Glucose 70 - 110 mg/dL 92     88   Calcium 8.7 - 10.5 mg/dL 8.8     9.4   Alkaline Phosphatase 55 - 135 U/L 45 (L)     45 (L)   PROTEIN TOTAL 6.0 - 8.4 g/dL 7.0     7.4   Albumin 3.5 - 5.2 g/dL 3.6     3.8   Uric Acid 2.4 - 5.7 mg/dL   4.2      BILIRUBIN TOTAL 0.1 - 1.0 mg/dL 0.4     0.4   AST 10 - 40 U/L 13     17   ALT 10 - 44 U/L 16     17   Cholesterol 120 - 199 mg/dL 204 (H)   255 (H)  169   HDL 40 - 75 mg/dL 40   38 (L)  53   HDL/Cholesterol Ratio 20.0 - 50.0 % 19.6 (L)   14.9 (L)  31.4   LDL Cholesterol External 63.0 - 159.0 mg/dL 121.4   160.2 (H)  92.0   Non-HDL Cholesterol mg/dL 164   217  116   Total Cholesterol/HDL Ratio 2.0 - 5.0  5.1 (H)   6.7 (H)  3.2   Triglycerides 30 - 150 mg/dL 213 (H)   284 (H)  120   Hemoglobin A1C External 4.0 - 5.6 %   5.1      Estimated Avg Glucose 68 - 131 mg/dL   100      TSH 0.400 - 4.000 uIU/mL   1.140      Hep A IgM Negative       Negative   Hep B C IgM Negative       Negative   Hepatitis B Surface Ag Negative       Negative   Hepatitis C Ab Negative       Negative   HIV 1/2 Ag/Ab Negative       Negative   Chlamydia, Amplified DNA Not Detected      Not Detected    HSV Ab, IgM by EIA <=0.90 INDEX      0.75   N gonorrhoeae, amplified DNA Not Detected      Not Detected    RPR Non-reactive       Non-reactive   HSV 1 IgG Negative       Negative   HSV 2 IgG Negative       Negative   Specimen UA   Urine, Clean  Catch       Color, UA Yellow, Straw, Maddy   Yellow       Appearance, UA Clear   Clear       Specific Gravity, UA 1.005 - 1.030   1.015       pH, UA 5.0 - 8.0   5.0       Protein, UA Negative   Negative       Glucose, UA Negative   Negative       Ketones, UA Negative   Negative       Occult Blood UA Negative   Negative       NITRITE UA Negative   Negative       Bilirubin (UA) Negative   Negative       Leukocytes, UA Negative   Negative       Creatinine, Urine 15.0 - 325.0 mg/dL  112.0       Microalbumin, Urine ug/mL  <5.0       MICROALB/CREAT RATIO 0.0 - 30.0 ug/mg  Unable to calculate       (L): Data is abnormally low  (H): Data is abnormally high        Assessment:     1. Type 2 diabetes mellitus with hyperglycemia, without long-term current use of insulin  Hemoglobin A1C    GlycoMark (TM)    Fructosamine   2. Hypothyroidism (acquired)  T4, Free    T3    TSH   3. Goiter  Thyroglobulin   4. Abnormal thyroid blood test     5. Thyroiditis     6. GERD without esophagitis     7. Fibromyalgia     8. Hyperlipidemia, unspecified hyperlipidemia type     9. Hypercholesterolemia     10. S/P hysterectomy     11. Attention deficit hyperactivity disorder (ADHD), predominantly inattentive type     12. Pseudoseizures     13. Migraine without aura and without status migrainosus, not intractable     14. Other depression     15. Anxiety     16. DDD (degenerative disc disease), lumbar          Regarding hypothyroidism; Patient appears clinically euthyroid. To continue LT4 at present dose and may adjust LT4 dose based on results.  Regarding possible thyroid nodule in right hemithyroid; to obtain ffup neck USs.  Regarding early type 2  diabetes; She is to ensure SMBG checks 2-3 x weekly and remain of metformin for now. To also recheck HBA1c.  To continue strict low glycemic diet adherence as before.   Regarding dyslipidemia; discussed dietary interventions to optimize her lipid profile with aim of LDL being under 100. Will recheck  lipid profile in another 6 mths and if not at goal by then will need to consider starting low dose statin therapy.  Regarding GERD; symptomatically stable on PPI therapy.  Regarding hypovitaminosis D; to continue vitamin D supplementation and will recheck 25OH vitamin d levels.  Regarding depression; mood stable. To continue Effexor XR as before.        Plan:       FFup in ~ 6mths

## 2022-08-25 LAB
THRYOGLOBULIN INTERPRETATION: ABNORMAL
THYROGLOB AB SERPL-ACNC: <1.8 IU/ML
THYROGLOB SERPL-MCNC: 14 NG/ML

## 2022-08-26 LAB — FRUCTOSAMINE SERPL-SCNC: 174 UMOL /L (ref 151–300)

## 2022-08-28 LAB — GLYCOMARK (TM): 20.3 UG/ML

## 2022-12-27 ENCOUNTER — PATIENT MESSAGE (OUTPATIENT)
Dept: RHEUMATOLOGY | Facility: CLINIC | Age: 42
End: 2022-12-27

## 2023-02-03 ENCOUNTER — OFFICE VISIT (OUTPATIENT)
Dept: OBSTETRICS AND GYNECOLOGY | Facility: CLINIC | Age: 43
End: 2023-02-03
Payer: MEDICAID

## 2023-02-03 VITALS
DIASTOLIC BLOOD PRESSURE: 70 MMHG | HEIGHT: 64 IN | WEIGHT: 161.38 LBS | BODY MASS INDEX: 27.55 KG/M2 | SYSTOLIC BLOOD PRESSURE: 118 MMHG

## 2023-02-03 DIAGNOSIS — R31.9 HEMATURIA, UNSPECIFIED TYPE: Primary | ICD-10-CM

## 2023-02-03 LAB
BILIRUBIN, UA POC OHS: NEGATIVE
BLOOD, UA POC OHS: ABNORMAL
CLARITY, UA POC OHS: CLEAR
COLOR, UA POC OHS: YELLOW
GLUCOSE, UA POC OHS: NEGATIVE
KETONES, UA POC OHS: NEGATIVE
LEUKOCYTES, UA POC OHS: NEGATIVE
NITRITE, UA POC OHS: NEGATIVE
PH, UA POC OHS: 7
PROTEIN, UA POC OHS: NEGATIVE
SPECIFIC GRAVITY, UA POC OHS: 1.01
UROBILINOGEN, UA POC OHS: 0.2

## 2023-02-03 PROCEDURE — 3078F DIAST BP <80 MM HG: CPT | Mod: CPTII,,, | Performed by: SPECIALIST

## 2023-02-03 PROCEDURE — 99213 PR OFFICE/OUTPT VISIT, EST, LEVL III, 20-29 MIN: ICD-10-PCS | Mod: S$PBB,,, | Performed by: SPECIALIST

## 2023-02-03 PROCEDURE — 99213 OFFICE O/P EST LOW 20 MIN: CPT | Mod: S$PBB,,, | Performed by: SPECIALIST

## 2023-02-03 PROCEDURE — 1159F PR MEDICATION LIST DOCUMENTED IN MEDICAL RECORD: ICD-10-PCS | Mod: CPTII,,, | Performed by: SPECIALIST

## 2023-02-03 PROCEDURE — 3008F PR BODY MASS INDEX (BMI) DOCUMENTED: ICD-10-PCS | Mod: CPTII,,, | Performed by: SPECIALIST

## 2023-02-03 PROCEDURE — 3074F SYST BP LT 130 MM HG: CPT | Mod: CPTII,,, | Performed by: SPECIALIST

## 2023-02-03 PROCEDURE — 81003 URINALYSIS AUTO W/O SCOPE: CPT | Mod: PBBFAC,PN | Performed by: SPECIALIST

## 2023-02-03 PROCEDURE — 3008F BODY MASS INDEX DOCD: CPT | Mod: CPTII,,, | Performed by: SPECIALIST

## 2023-02-03 PROCEDURE — 99999 PR PBB SHADOW E&M-EST. PATIENT-LVL IV: ICD-10-PCS | Mod: PBBFAC,,, | Performed by: SPECIALIST

## 2023-02-03 PROCEDURE — 3078F PR MOST RECENT DIASTOLIC BLOOD PRESSURE < 80 MM HG: ICD-10-PCS | Mod: CPTII,,, | Performed by: SPECIALIST

## 2023-02-03 PROCEDURE — 1159F MED LIST DOCD IN RCRD: CPT | Mod: CPTII,,, | Performed by: SPECIALIST

## 2023-02-03 PROCEDURE — 87086 URINE CULTURE/COLONY COUNT: CPT | Performed by: SPECIALIST

## 2023-02-03 PROCEDURE — 99999 PR PBB SHADOW E&M-EST. PATIENT-LVL IV: CPT | Mod: PBBFAC,,, | Performed by: SPECIALIST

## 2023-02-03 PROCEDURE — 3074F PR MOST RECENT SYSTOLIC BLOOD PRESSURE < 130 MM HG: ICD-10-PCS | Mod: CPTII,,, | Performed by: SPECIALIST

## 2023-02-03 PROCEDURE — 87088 URINE BACTERIA CULTURE: CPT | Performed by: SPECIALIST

## 2023-02-03 PROCEDURE — 99214 OFFICE O/P EST MOD 30 MIN: CPT | Mod: PBBFAC,PN | Performed by: SPECIALIST

## 2023-02-03 PROCEDURE — 87147 CULTURE TYPE IMMUNOLOGIC: CPT | Performed by: SPECIALIST

## 2023-02-03 NOTE — PROGRESS NOTES
42 yo BF presents with complaint gross hematuria without associated PP, dysuria or flank pain This occurred yesterday and am. Denies history of IC, stone  Past Medical History:   Diagnosis Date    ADHD (attention deficit hyperactivity disorder)     Anxiety     ZUÑIGA (dyspnea on exertion)     has been worked up    Endometriosis, uterus     uterine bladder and ovaries    Fibromyalgia     Hypertension 1/17/2019    Migraine     Migraine headache     Mitral valve prolapse     Seizures     last seizure a couple of weeks ago, pt states that she has pseudo-seizures    Thyroid disease     Toe fracture        Past Surgical History:   Procedure Laterality Date    CYSTOSCOPY N/A 3/10/2021    Procedure: CYSTOSCOPY;  Surgeon: Wanda Beaver MD;  Location: [facility] OR;  Service: Urology;  Laterality: N/A;    dx lap      ecsi      HYSTERECTOMY  2007    CORINA due to DUB,  ovaries intact    lumbar facet injection      MO EXPLORATORY OF ABDOMEN      RHIZOTOMY      TUBAL LIGATION         Family History   Problem Relation Age of Onset    Thyroid disease Mother     Heart disease Father     Hypertension Father     Pancreatitis Father     Diabetes Father     Stomach cancer Paternal Aunt     Stomach cancer Paternal Uncle     Cancer Paternal Uncle         lung cancer    Ovarian cancer Paternal Cousin     Ovarian cancer Paternal Cousin     Ovarian cancer Maternal Cousin     Melanoma Neg Hx     Psoriasis Neg Hx     Lupus Neg Hx     Eczema Neg Hx     Breast cancer Neg Hx        Social History     Socioeconomic History    Marital status:    Tobacco Use    Smoking status: Never    Smokeless tobacco: Never   Substance and Sexual Activity    Alcohol use: Yes     Comment: OCCASIONALLY    Drug use: No    Sexual activity: Not Currently     Partners: Male     Birth control/protection: None, See Surgical Hx       Current Outpatient Medications   Medication Sig Dispense Refill    cyclobenzaprine (FLEXERIL) 10 MG tablet TAKE 1 TABLET BY MOUTH TWICE  DAILY AS NEEDED 60 tablet 3    ergocalciferol (ERGOCALCIFEROL) 50,000 unit Cap TAKE 1 CAPSULE BY MOUTH EVERY 7 DAYS 12 capsule 3    FEROSUL 325 mg (65 mg iron) Tab tablet TAKE 1 TABLET BY MOUTH EVERY DAY WITH BREAKFAST 90 tablet 3    gabapentin (NEURONTIN) 300 MG capsule Take 1 capsule (300 mg total) by mouth 2 (two) times daily. 60 capsule 3    gabapentin (NEURONTIN) 800 MG tablet TAKE 1 TABLET(800 MG) BY MOUTH TWICE DAILY 60 tablet 3    levothyroxine (SYNTHROID) 75 MCG tablet TAKE 1 TABLET BY MOUTH EVERY DAY 90 tablet 3    liothyronine (CYTOMEL) 5 MCG Tab TAKE 1 TABLET BY MOUTH EVERY DAY 90 tablet 3    ONETOUCH DELICA LANCETS 33 gauge Misc TEST EVERY DAY AS DIRECTED 100 each 3    ONETOUCH VERIO Strp USE AS DIRECTED ONCE DAILY 100 strip 3    permethrin (ELIMITE) 5 % cream Apply to AA face 3 times a week at bedtime 60 g 1    propranoloL (INDERAL LA) 80 MG 24 hr capsule TK 1 C PO QD      topiramate (TOPAMAX) 50 MG tablet TAKE 1 TABLET BY MOUTH TWICE DAILY 60 tablet 11    traZODone (DESYREL) 50 MG tablet TAKE 1 TABLET(50 MG) BY MOUTH EVERY EVENING 30 tablet 1    triamcinolone acetonide 0.1% (KENALOG) 0.1 % cream AAA bid 60 g 3    boric acid (BORIC ACID) vaginal suppository Place 1 each (650 mg total) vaginally once daily. 10 suppository 2    boric acid (BORIC ACID) vaginal suppository Place 1 each (650 mg total) vaginally once daily. 10 suppository 1    ISOtretinoin (ACCUTANE) 30 MG capsule Take 2 caps PO with dinner daily 60 capsule 0    triamcinolone acetonide 0.025% (KENALOG) 0.025 % cream Apply topically 2 (two) times daily. 80 g 0    triamcinolone acetonide 0.025% (KENALOG) 0.025 % Oint Thin film to lips and eczematous plaques BID PRN flare 80 g 1     No current facility-administered medications for this visit.       Review of patient's allergies indicates:  No Known Allergies    Review of System:   General: no chills, fever, night sweats, weight gain or weight loss  Psychological: no depression or suicidal  ideation  Breasts: no new or changing breast lumps, nipple discharge or masses.  Respiratory: no cough, shortness of breath, or wheezing  Cardiovascular: no chest pain or dyspnea on exertion  Gastrointestinal: no abdominal pain, change in bowel habits, or black or bloody stools  Genito-Urinary: no incontinence, urinary frequency/urgency or vulvar/vaginal symptoms, pelvic pain or abnormal vaginal bleeding.  AS ABOVE  Musculoskeletal: no gait disturbance or muscular weakness     U/S pos large blood  Nitrite neg  Bact neg    PE:   APPEARANCE: Well nourished, well developed, in no acute distress.  NECK: Neck symmetric without masses or thyromegaly.  NODES: No inguinal lymph node enlargement.  ABDOMEN: Soft. No tenderness or masses. No hepatosplenomegaly. No hernias.  BREASTS: deferred    PELVIC: Normal external female genitalia without lesions. Normal hair distribution. Adequate perineal body, normal urethral meatus. Vagina moist and well rugated without lesions or discharge. No significant cystocele or rectocele. Uterus and cervix surgically absent. Bimanual exam revealed no masses, tenderness or abnormality.    NOTE  NURSING PERSONAL PRESENT FOR ENTIRE PHYSICAL EXAM     I discussed possible etiologies of hematuria and rec U/A C and S and will refer to urology for further eval  Answered all questions

## 2023-02-05 LAB — BACTERIA UR CULT: ABNORMAL

## 2023-02-06 ENCOUNTER — PATIENT MESSAGE (OUTPATIENT)
Dept: OBSTETRICS AND GYNECOLOGY | Facility: CLINIC | Age: 43
End: 2023-02-06
Payer: MEDICAID

## 2023-02-06 RX ORDER — CEPHALEXIN 250 MG/1
250 CAPSULE ORAL 4 TIMES DAILY
Qty: 40 CAPSULE | Refills: 0 | Status: SHIPPED | OUTPATIENT
Start: 2023-02-06 | End: 2023-02-16

## 2023-02-07 ENCOUNTER — TELEPHONE (OUTPATIENT)
Dept: UROLOGY | Facility: CLINIC | Age: 43
End: 2023-02-07
Payer: MEDICAID

## 2023-02-07 NOTE — TELEPHONE ENCOUNTER
----- Message from Jeannette Do sent at 2/7/2023  4:08 PM CST -----  Appointment Request From: Moris Alfaro    With Provider: Wanda Beaver MD [[facility] - Urology]    Preferred Date Range: 2/7/2023 - 2/10/2023    Preferred Times: Any Time    Reason for visit: Blood in urine    Comments:   Blood in urine. I was seen by my Gyn on 2/3 for blood in urine. They did urine test. He strongly suggested I see a Urologist.

## 2023-02-07 NOTE — TELEPHONE ENCOUNTER
Returned call and spoke with patient, informed no appts is available in time range she request, plus provider would like to do repeat urine test after antibiotics course is completed. She will contact the office once completed and message to provider for repeat labs and sooner appt , patient verbally understood.

## 2023-03-08 ENCOUNTER — OFFICE VISIT (OUTPATIENT)
Dept: DERMATOLOGY | Facility: CLINIC | Age: 43
End: 2023-03-08
Payer: MEDICAID

## 2023-03-08 VITALS — BODY MASS INDEX: 27.49 KG/M2 | HEIGHT: 64 IN | WEIGHT: 161 LBS

## 2023-03-08 DIAGNOSIS — L29.9 PRURITUS: ICD-10-CM

## 2023-03-08 DIAGNOSIS — L98.9 DISEASE OF SKIN AND SUBCUTANEOUS TISSUE: Primary | ICD-10-CM

## 2023-03-08 PROCEDURE — 88305 TISSUE EXAM BY PATHOLOGIST: CPT | Performed by: DERMATOLOGY

## 2023-03-08 PROCEDURE — 88312 SPECIAL STAINS GROUP 1: CPT | Mod: 26,,, | Performed by: DERMATOLOGY

## 2023-03-08 PROCEDURE — 88312 SPECIAL STAINS GROUP 1: CPT | Performed by: DERMATOLOGY

## 2023-03-08 PROCEDURE — 88312 PR  SPECIAL STAINS,GROUP I: ICD-10-PCS | Mod: 26,,, | Performed by: DERMATOLOGY

## 2023-03-08 PROCEDURE — 88342 IMHCHEM/IMCYTCHM 1ST ANTB: CPT | Performed by: DERMATOLOGY

## 2023-03-08 PROCEDURE — 88305 TISSUE EXAM BY PATHOLOGIST: CPT | Mod: 26,,, | Performed by: DERMATOLOGY

## 2023-03-08 PROCEDURE — 88305 TISSUE EXAM BY PATHOLOGIST: ICD-10-PCS | Mod: 26,,, | Performed by: DERMATOLOGY

## 2023-03-08 PROCEDURE — 88341 IMHCHEM/IMCYTCHM EA ADD ANTB: CPT | Mod: 59 | Performed by: DERMATOLOGY

## 2023-03-08 PROCEDURE — 99999 PR PBB SHADOW E&M-EST. PATIENT-LVL III: CPT | Mod: PBBFAC,,, | Performed by: DERMATOLOGY

## 2023-03-08 PROCEDURE — 99214 PR OFFICE/OUTPT VISIT, EST, LEVL IV, 30-39 MIN: ICD-10-PCS | Mod: 25,S$PBB,, | Performed by: DERMATOLOGY

## 2023-03-08 PROCEDURE — 88341 IMHCHEM/IMCYTCHM EA ADD ANTB: CPT | Mod: 26,,, | Performed by: DERMATOLOGY

## 2023-03-08 PROCEDURE — 99213 OFFICE O/P EST LOW 20 MIN: CPT | Mod: PBBFAC,PO,25 | Performed by: DERMATOLOGY

## 2023-03-08 PROCEDURE — 99999 PR PBB SHADOW E&M-EST. PATIENT-LVL III: ICD-10-PCS | Mod: PBBFAC,,, | Performed by: DERMATOLOGY

## 2023-03-08 PROCEDURE — 88342 IMHCHEM/IMCYTCHM 1ST ANTB: CPT | Mod: 26,,, | Performed by: DERMATOLOGY

## 2023-03-08 PROCEDURE — 11104 PR PUNCH BIOPSY, SKIN, SINGLE LESION: ICD-10-PCS | Mod: S$PBB,,, | Performed by: DERMATOLOGY

## 2023-03-08 PROCEDURE — 88341 PR IHC OR ICC EACH ADD'L SINGLE ANTIBODY  STAINPR: ICD-10-PCS | Mod: 26,,, | Performed by: DERMATOLOGY

## 2023-03-08 PROCEDURE — 11104 PUNCH BX SKIN SINGLE LESION: CPT | Mod: S$PBB,,, | Performed by: DERMATOLOGY

## 2023-03-08 PROCEDURE — 11104 PUNCH BX SKIN SINGLE LESION: CPT | Mod: PBBFAC,PO | Performed by: DERMATOLOGY

## 2023-03-08 PROCEDURE — 88342 CHG IMMUNOCYTOCHEMISTRY: ICD-10-PCS | Mod: 26,,, | Performed by: DERMATOLOGY

## 2023-03-08 PROCEDURE — 99214 OFFICE O/P EST MOD 30 MIN: CPT | Mod: 25,S$PBB,, | Performed by: DERMATOLOGY

## 2023-03-08 RX ORDER — PROPRANOLOL HYDROCHLORIDE 120 MG/1
120 CAPSULE, EXTENDED RELEASE ORAL DAILY
COMMUNITY
Start: 2023-02-25

## 2023-03-08 RX ORDER — TRIAMCINOLONE ACETONIDE 1 MG/G
CREAM TOPICAL
Qty: 454 G | Refills: 3 | Status: SHIPPED | OUTPATIENT
Start: 2023-03-08

## 2023-03-08 NOTE — PROGRESS NOTES
Subjective:       Patient ID:  Moris Alfaro is a 43 y.o. female who presents for   Chief Complaint   Patient presents with    Dry Skin     Entire body     LOV 5/18/22 ([facility]) Dyslipidemia, Acne    Patient here today for dry skin patches to entire body x few months, very itchy  Pt denies changes in skin care products and detergents  Uses aveeno dry and itchy skin, no regular moisturizer use,     Completed course of isotretinoin in 5/2022  hysterectomy    Derm HX:  Denies Phx of NMSC  Denies Fhx of MM      Current Outpatient Medications:   ·  cyclobenzaprine (FLEXERIL) 10 MG tablet, TAKE 1 TABLET BY MOUTH TWICE DAILY AS NEEDED, Disp: 60 tablet, Rfl: 3  ·  ergocalciferol (ERGOCALCIFEROL) 50,000 unit Cap, TAKE 1 CAPSULE BY MOUTH EVERY 7 DAYS, Disp: 12 capsule, Rfl: 3  ·  FEROSUL 325 mg (65 mg iron) Tab tablet, TAKE 1 TABLET BY MOUTH EVERY DAY WITH BREAKFAST, Disp: 90 tablet, Rfl: 3  ·  gabapentin (NEURONTIN) 300 MG capsule, Take 1 capsule (300 mg total) by mouth 2 (two) times daily., Disp: 60 capsule, Rfl: 3  ·  gabapentin (NEURONTIN) 800 MG tablet, TAKE 1 TABLET(800 MG) BY MOUTH TWICE DAILY, Disp: 60 tablet, Rfl: 3  ·  levothyroxine (SYNTHROID) 75 MCG tablet, TAKE 1 TABLET BY MOUTH EVERY DAY, Disp: 90 tablet, Rfl: 3  ·  liothyronine (CYTOMEL) 5 MCG Tab, TAKE 1 TABLET BY MOUTH EVERY DAY, Disp: 90 tablet, Rfl: 3  ·  topiramate (TOPAMAX) 50 MG tablet, TAKE 1 TABLET BY MOUTH TWICE DAILY, Disp: 60 tablet, Rfl: 11  ·  traZODone (DESYREL) 50 MG tablet, TAKE 1 TABLET(50 MG) BY MOUTH EVERY EVENING, Disp: 30 tablet, Rfl: 1  ·  propranoloL (INDERAL LA) 120 MG 24 hr capsule, Take 120 mg by mouth once daily., Disp: , Rfl:       Review of Systems   Constitutional:  Negative for fever, chills, weight loss, fatigue and night sweats.   HENT:  Negative for nosebleeds and headaches.    Gastrointestinal:  Negative for nausea, vomiting, abdominal pain and diarrhea.   Musculoskeletal:  Negative for myalgias and arthralgias.   Skin:   Positive for itching and dry skin. Negative for rash, sun sensitivity, daily sunscreen use, activity-related sunscreen use, recent sunburn and dry lips.   Neurological:  Negative for headaches.   Psychiatric/Behavioral:  Negative for depressed mood.       Objective:    Physical Exam   Constitutional: She appears well-developed and well-nourished. No distress.   Neurological: She is alert and oriented to person, place, and time. She is not disoriented.   Psychiatric: She has a normal mood and affect.   Skin:   Areas Examined (abnormalities noted in diagram):   Scalp / Hair Palpated and Inspected  Head / Face Inspection Performed  Neck Inspection Performed  Chest / Axilla Inspection Performed  Abdomen Inspection Performed  Genitals / Buttocks / Groin Inspection Performed  Back Inspection Performed  RUE Inspected  LUE Inspection Performed  RLE Inspected  LLE Inspection Performed  Nails and Digits Inspection Performed            Diagram Legend     Erythematous scaling macule/papule c/w actinic keratosis       Vascular papule c/w angioma      Pigmented verrucoid papule/plaque c/w seborrheic keratosis      Yellow umbilicated papule c/w sebaceous hyperplasia      Irregularly shaped tan macule c/w lentigo     1-2 mm smooth white papules consistent with Milia      Movable subcutaneous cyst with punctum c/w epidermal inclusion cyst      Subcutaneous movable cyst c/w pilar cyst      Firm pink to brown papule c/w dermatofibroma      Pedunculated fleshy papule(s) c/w skin tag(s)      Evenly pigmented macule c/w junctional nevus     Mildly variegated pigmented, slightly irregular-bordered macule c/w mildly atypical nevus      Flesh colored to evenly pigmented papule c/w intradermal nevus       Pink pearly papule/plaque c/w basal cell carcinoma      Erythematous hyperkeratotic cursted plaque c/w SCC      Surgical scar with no sign of skin cancer recurrence      Open and closed comedones      Inflammatory papules and pustules       "Verrucoid papule consistent consistent with wart     Erythematous eczematous patches and plaques     Dystrophic onycholytic nail with subungual debris c/w onychomycosis     Umbilicated papule    Erythematous-base heme-crusted tan verrucoid plaque consistent with inflamed seborrheic keratosis     Erythematous Silvery Scaling Plaque c/w Psoriasis     See annotation            Assessment / Plan:      Pathology Orders:       Normal Orders This Visit    Specimen to Pathology, Dermatology     Questions:    Procedure Type: Dermatology and skin neoplasms    Number of Specimens: 1    ------------------------: -------------------------    Spec 1 Procedure: Biopsy    Spec 1 Clinical Impression: Xerotic, "cigarette paper" like skin with minimal erythema, r/o early CTCL    Spec 1 Source: R lower chest    Release to patient:           Disease of skin and subcutaneous tissue  -     triamcinolone acetonide 0.1% (KENALOG) 0.1 % cream; AAA bid  Dispense: 454 g; Refill: 3  -     Specimen to Pathology, Dermatology    Pruritus    Punch biopsy procedure note:  Punch biopsy performed after verbal consent obtained. Area marked and prepped with alcohol. Approximately 1cc of 1% lidocaine with epinephrine injected. 4 mm disposable punch used to remove lesion. Hemostasis obtained and biopsy site closed with 1 - 2 Prolene sutures. Wound care instructions reviewed with patient and handout given.    Double covered areas, itchy, r/o CTCL           No follow-ups on file.  "

## 2023-03-10 ENCOUNTER — OCCUPATIONAL HEALTH (OUTPATIENT)
Dept: URGENT CARE | Facility: CLINIC | Age: 43
End: 2023-03-10
Payer: MEDICAID

## 2023-03-10 DIAGNOSIS — Z13.9 ENCOUNTER FOR SCREENING: Primary | ICD-10-CM

## 2023-03-10 PROCEDURE — 86580 POCT TB SKIN TEST: ICD-10-PCS | Mod: S$GLB,,, | Performed by: PHYSICIAN ASSISTANT

## 2023-03-10 PROCEDURE — 86580 TB INTRADERMAL TEST: CPT | Mod: S$GLB,,, | Performed by: PHYSICIAN ASSISTANT

## 2023-03-17 ENCOUNTER — OFFICE VISIT (OUTPATIENT)
Dept: ENDOCRINOLOGY | Facility: CLINIC | Age: 43
End: 2023-03-17
Payer: MEDICAID

## 2023-03-17 VITALS
SYSTOLIC BLOOD PRESSURE: 100 MMHG | WEIGHT: 145.81 LBS | HEART RATE: 88 BPM | HEIGHT: 64 IN | BODY MASS INDEX: 24.89 KG/M2 | TEMPERATURE: 98 F | OXYGEN SATURATION: 97 % | DIASTOLIC BLOOD PRESSURE: 70 MMHG

## 2023-03-17 DIAGNOSIS — F44.9 CONVERSION DISORDER: ICD-10-CM

## 2023-03-17 DIAGNOSIS — F41.9 ANXIETY: ICD-10-CM

## 2023-03-17 DIAGNOSIS — E78.00 HYPERCHOLESTEROLEMIA: ICD-10-CM

## 2023-03-17 DIAGNOSIS — L70.0 ACNE VULGARIS: ICD-10-CM

## 2023-03-17 DIAGNOSIS — F32.89 OTHER DEPRESSION: ICD-10-CM

## 2023-03-17 DIAGNOSIS — K21.9 GERD WITHOUT ESOPHAGITIS: ICD-10-CM

## 2023-03-17 DIAGNOSIS — E55.9 HYPOVITAMINOSIS D: ICD-10-CM

## 2023-03-17 DIAGNOSIS — G43.009 MIGRAINE WITHOUT AURA AND WITHOUT STATUS MIGRAINOSUS, NOT INTRACTABLE: ICD-10-CM

## 2023-03-17 DIAGNOSIS — E06.9 THYROIDITIS: ICD-10-CM

## 2023-03-17 DIAGNOSIS — F44.5 PSEUDOSEIZURES: ICD-10-CM

## 2023-03-17 DIAGNOSIS — R94.6 ABNORMAL THYROID FUNCTION TEST: ICD-10-CM

## 2023-03-17 DIAGNOSIS — E11.69 HYPERLIPIDEMIA ASSOCIATED WITH TYPE 2 DIABETES MELLITUS: ICD-10-CM

## 2023-03-17 DIAGNOSIS — E78.5 HYPERLIPIDEMIA, UNSPECIFIED HYPERLIPIDEMIA TYPE: ICD-10-CM

## 2023-03-17 DIAGNOSIS — I10 PRIMARY HYPERTENSION: ICD-10-CM

## 2023-03-17 DIAGNOSIS — E03.9 HYPOTHYROIDISM (ACQUIRED): Primary | ICD-10-CM

## 2023-03-17 DIAGNOSIS — R53.82 CHRONIC FATIGUE: ICD-10-CM

## 2023-03-17 DIAGNOSIS — E11.65 TYPE 2 DIABETES MELLITUS WITH HYPERGLYCEMIA, WITHOUT LONG-TERM CURRENT USE OF INSULIN: ICD-10-CM

## 2023-03-17 DIAGNOSIS — E78.5 HYPERLIPIDEMIA ASSOCIATED WITH TYPE 2 DIABETES MELLITUS: ICD-10-CM

## 2023-03-17 DIAGNOSIS — E78.00 HYPERCHOLESTEROLEMIA: Primary | ICD-10-CM

## 2023-03-17 DIAGNOSIS — R79.89 ABNORMAL THYROID BLOOD TEST: ICD-10-CM

## 2023-03-17 DIAGNOSIS — F90.0 ATTENTION DEFICIT HYPERACTIVITY DISORDER (ADHD), PREDOMINANTLY INATTENTIVE TYPE: ICD-10-CM

## 2023-03-17 PROCEDURE — 3066F NEPHROPATHY DOC TX: CPT | Mod: CPTII,,, | Performed by: INTERNAL MEDICINE

## 2023-03-17 PROCEDURE — 3008F BODY MASS INDEX DOCD: CPT | Mod: CPTII,,, | Performed by: INTERNAL MEDICINE

## 2023-03-17 PROCEDURE — 3074F PR MOST RECENT SYSTOLIC BLOOD PRESSURE < 130 MM HG: ICD-10-PCS | Mod: CPTII,,, | Performed by: INTERNAL MEDICINE

## 2023-03-17 PROCEDURE — 3078F DIAST BP <80 MM HG: CPT | Mod: CPTII,,, | Performed by: INTERNAL MEDICINE

## 2023-03-17 PROCEDURE — 3078F PR MOST RECENT DIASTOLIC BLOOD PRESSURE < 80 MM HG: ICD-10-PCS | Mod: CPTII,,, | Performed by: INTERNAL MEDICINE

## 2023-03-17 PROCEDURE — 3066F PR DOCUMENTATION OF TREATMENT FOR NEPHROPATHY: ICD-10-PCS | Mod: CPTII,,, | Performed by: INTERNAL MEDICINE

## 2023-03-17 PROCEDURE — 99999 PR PBB SHADOW E&M-EST. PATIENT-LVL IV: ICD-10-PCS | Mod: PBBFAC,,, | Performed by: INTERNAL MEDICINE

## 2023-03-17 PROCEDURE — 3061F NEG MICROALBUMINURIA REV: CPT | Mod: CPTII,,, | Performed by: INTERNAL MEDICINE

## 2023-03-17 PROCEDURE — 3008F PR BODY MASS INDEX (BMI) DOCUMENTED: ICD-10-PCS | Mod: CPTII,,, | Performed by: INTERNAL MEDICINE

## 2023-03-17 PROCEDURE — 99999 PR PBB SHADOW E&M-EST. PATIENT-LVL IV: CPT | Mod: PBBFAC,,, | Performed by: INTERNAL MEDICINE

## 2023-03-17 PROCEDURE — 99214 PR OFFICE/OUTPT VISIT, EST, LEVL IV, 30-39 MIN: ICD-10-PCS | Mod: S$PBB,,, | Performed by: INTERNAL MEDICINE

## 2023-03-17 PROCEDURE — 1159F PR MEDICATION LIST DOCUMENTED IN MEDICAL RECORD: ICD-10-PCS | Mod: CPTII,,, | Performed by: INTERNAL MEDICINE

## 2023-03-17 PROCEDURE — 99214 OFFICE O/P EST MOD 30 MIN: CPT | Mod: S$PBB,,, | Performed by: INTERNAL MEDICINE

## 2023-03-17 PROCEDURE — 1160F PR REVIEW ALL MEDS BY PRESCRIBER/CLIN PHARMACIST DOCUMENTED: ICD-10-PCS | Mod: CPTII,,, | Performed by: INTERNAL MEDICINE

## 2023-03-17 PROCEDURE — 3061F PR NEG MICROALBUMINURIA RESULT DOCUMENTED/REVIEW: ICD-10-PCS | Mod: CPTII,,, | Performed by: INTERNAL MEDICINE

## 2023-03-17 PROCEDURE — 3074F SYST BP LT 130 MM HG: CPT | Mod: CPTII,,, | Performed by: INTERNAL MEDICINE

## 2023-03-17 PROCEDURE — 1159F MED LIST DOCD IN RCRD: CPT | Mod: CPTII,,, | Performed by: INTERNAL MEDICINE

## 2023-03-17 PROCEDURE — 1160F RVW MEDS BY RX/DR IN RCRD: CPT | Mod: CPTII,,, | Performed by: INTERNAL MEDICINE

## 2023-03-17 PROCEDURE — 99214 OFFICE O/P EST MOD 30 MIN: CPT | Mod: PBBFAC,PO | Performed by: INTERNAL MEDICINE

## 2023-03-17 RX ORDER — PRAVASTATIN SODIUM 20 MG/1
20 TABLET ORAL DAILY
Qty: 90 TABLET | Refills: 3 | Status: SHIPPED | OUTPATIENT
Start: 2023-03-17 | End: 2024-03-16

## 2023-03-17 RX ORDER — ERGOCALCIFEROL 1.25 MG/1
CAPSULE ORAL
Qty: 12 CAPSULE | Refills: 3 | Status: SHIPPED | OUTPATIENT
Start: 2023-03-17

## 2023-03-17 RX ORDER — ASPIRIN 81 MG/1
81 TABLET ORAL DAILY
Qty: 90 TABLET | Refills: 3 | Status: SHIPPED | OUTPATIENT
Start: 2023-03-17 | End: 2023-06-15

## 2023-03-17 NOTE — PROGRESS NOTES
Subjective:      Patient ID: Moris Alfaro is a 43 y.o. female.    Chief Complaint:      Hypothyroidism     Patient is a 43 yr old lady with hypothyroidism on thyroid hormone repletion and type 2 diabetes seen today.    History of Present Illness      The patient, Ms Roper is a 43 yr old lady seen in [facility] on account of hypothyroidism on thyroid hormone repletion. She also has a background history of chronic migraines, obesity, early onset type 2 diabetes,  anxiety neurosis, conversion disorder and pseudoseizures in the past in addition to GERD.    Patient also has a poorly defined background past history of fibromyalgia and  had been commenced on thyroid hormone repletion with Lt4 @75mcg QD and cytomel 5mcg Qd.  Patient had baseline screening thyroid USs from 02/22 which showed no nodular disease of note.    She is presently diet controlled for type 2 diabetes. At the present time she is entirely diet controlled. She is presently not doing SMBGs at all.  Her most recent HBA1c from 02/22 was 5.1 reflecting very good glycemic control.  Patient complains of significant fatigue over the last few months. She has no problems currently with hot flashes but has been having some problems with cold sensitivity.  Patient does have intermittent palpitations.  She has not been checking her SMBGs frequently.  In addition she had a screening thyroid ultrasound (from 07/15) which was essentially -ve for any significant thyroid nodular disease.  Patient feels well. She is sleeping a little better now but continues to have episodic insomnia.  She is s/p CORINA for endometriosis but has one of her ovaries insitu. She has been having intermittent hot flashes and drenching sweats and also has some mouth dryness for which she takes regular free fluid. Her hormonal testing however has not showed her to be perimenopausal.  Patient is now s/p unilateral oophorectomy and unilateral fallopian tube removal.   Patient had her opthalmic  "screening done in Sept 2020  ([facility]) and this was normal with no evidence of retinopathy.  Patient does not receive flu vaccinations by choice. Patient has also never pneumovax again by choice.  The remains on topiramate 50mg BID. The associated migraine headaches are laregly well controlled.  Patient has been of vitamin D supplements for a few months now.  She complains of significant fatigue over ~ 1 month now. She also does have intermittent generalized myalgias and does have a history of fibromyalgia.  She typically sleeps 7-8 hr sleep usually. She dreams 2-3 times weekly. She has no early morning headaches.    Review of Systems   Constitutional:  Negative for diaphoresis, fatigue and unexpected weight change.   HENT:  Negative for facial swelling, trouble swallowing and voice change.    Eyes:  Negative for visual disturbance.   Respiratory:  Negative for cough and shortness of breath.    Cardiovascular:  Negative for chest pain, palpitations and leg swelling.   Gastrointestinal:  Negative for abdominal pain, diarrhea, nausea and vomiting.   Endocrine: Negative for cold intolerance and polyuria.   Genitourinary:  Negative for flank pain, frequency and menstrual problem (S/P CORINA and unilateral oophorectomy).   Musculoskeletal:  Negative for arthralgias, back pain and myalgias.   Skin:  Negative for color change, pallor and rash.   Neurological:  Negative for dizziness, numbness and headaches.   Hematological:  Does not bruise/bleed easily.   Psychiatric/Behavioral:  Negative for confusion. The patient is not nervous/anxious.      Objective:    /70 (BP Location: Left arm, Patient Position: Sitting, BP Method: Medium (Manual))   Pulse 88   Temp 98 °F (36.7 °C) (Oral)   Ht 5' 4" (1.626 m)   Wt 66.2 kg (145 lb 13.4 oz)   SpO2 97%   BMI 25.03 kg/m² Body surface area is 1.73 meters squared.               Physical Exam  Vitals reviewed.   Constitutional:       Appearance: She is not diaphoretic.     "  Comments: Pleasant young lady. Not pale, anicteric and afebrile. Well hydrated. Clinically comfortable. Not in any apparent distress. Acyanotic.   HENT:      Mouth/Throat:      Mouth: Mucous membranes are moist.   Eyes:      General: No scleral icterus.     Pupils: Pupils are equal, round, and reactive to light.   Neck:      Thyroid: No thyroid mass, thyromegaly or thyroid tenderness.      Vascular: No carotid bruit.      Trachea: Trachea and phonation normal. No tracheal tenderness.   Cardiovascular:      Rate and Rhythm: Normal rate and regular rhythm.      Pulses: Normal pulses.      Heart sounds: Normal heart sounds. No murmur heard.  Pulmonary:      Effort: No respiratory distress.      Breath sounds: No stridor. No wheezing, rhonchi or rales.   Abdominal:      General: Abdomen is flat.      Tenderness: There is no abdominal tenderness.   Musculoskeletal:         General: No swelling.      Cervical back: Neck supple. No rigidity or tenderness.   Lymphadenopathy:      Cervical: No cervical adenopathy.   Neurological:      General: No focal deficit present.      Mental Status: She is alert and oriented to person, place, and time.      Cranial Nerves: No cranial nerve deficit.      Sensory: No sensory deficit.      Motor: No weakness.      Gait: Gait normal.   Psychiatric:         Mood and Affect: Mood normal.         Behavior: Behavior normal.         Thought Content: Thought content normal.         Judgment: Judgment normal.       Lab Review:      Latest Reference Range & Units 01/24/22 08:57 02/18/22 15:22 02/18/22 15:39 02/24/22 18:25 03/16/22 08:27 07/11/22 15:10 07/11/22 15:18 07/11/22 15:43 08/23/22 15:36 02/03/23 10:31 02/03/23 11:51 03/08/23 10:10   WBC 3.90 - 12.70 K/uL   7.73            RBC 4.00 - 5.40 M/uL   4.28            Hemoglobin 12.0 - 16.0 g/dL   12.0            Hematocrit 37.0 - 48.5 %   39.7            MCV 82 - 98 fL   93            MCH 27.0 - 31.0 pg   28.0            MCHC 32.0 - 36.0 g/dL    30.2 (L)            RDW 11.5 - 14.5 %   13.3            Platelets 150 - 450 K/uL   286            MPV 9.2 - 12.9 fL   10.7            Gran % 38.0 - 73.0 %   60.2            Lymph % 18.0 - 48.0 %   28.8            Mono % 4.0 - 15.0 %   8.3            Eosinophil % 0.0 - 8.0 %   1.2            Basophil % 0.0 - 1.9 %   0.3            Immature Granulocytes 0.0 - 0.5 %   1.2 (H)            Gran # (ANC) 1.8 - 7.7 K/uL   4.7            Lymph # 1.0 - 4.8 K/uL   2.2            Mono # 0.3 - 1.0 K/uL   0.6            Eos # 0.0 - 0.5 K/uL   0.1            Baso # 0.00 - 0.20 K/uL   0.02            Immature Grans (Abs) 0.00 - 0.04 K/uL   0.09 (H)            nRBC 0 /100 WBC   0            Differential Method    Automated            Sodium 136 - 145 mmol/L 139      134 (L)        Potassium 3.5 - 5.1 mmol/L 3.7      3.6        Chloride 95 - 110 mmol/L 107      104        CO2 23 - 29 mmol/L 23      24        Anion Gap 8 - 16 mmol/L 9      6 (L)        BUN 6 - 20 mg/dL 10      11        Creatinine 0.5 - 1.4 mg/dL 0.8      0.8        eGFR if non African American >60 mL/min/1.73 m^2 >60      >60.0        eGFR if African American >60 mL/min/1.73 m^2 >60      >60.0        Glucose 70 - 110 mg/dL 92      88        Calcium 8.7 - 10.5 mg/dL 8.8      9.4        Alkaline Phosphatase 55 - 135 U/L 45 (L)      45 (L)        PROTEIN TOTAL 6.0 - 8.4 g/dL 7.0      7.4        Albumin 3.5 - 5.2 g/dL 3.6      3.8        Uric Acid 2.4 - 5.7 mg/dL   4.2            BILIRUBIN TOTAL 0.1 - 1.0 mg/dL 0.4      0.4        AST 10 - 40 U/L 13      17        ALT 10 - 44 U/L 16      17        Cholesterol 120 - 199 mg/dL 204 (H)    255 (H)  169        HDL 40 - 75 mg/dL 40    38 (L)  53        HDL/Cholesterol Ratio 20.0 - 50.0 % 19.6 (L)    14.9 (L)  31.4        LDL Cholesterol External 63.0 - 159.0 mg/dL 121.4    160.2 (H)  92.0        Non-HDL Cholesterol mg/dL 164    217  116        Total Cholesterol/HDL Ratio 2.0 - 5.0  5.1 (H)    6.7 (H)  3.2        Triglycerides  30 - 150 mg/dL 213 (H)    284 (H)  120        Hemoglobin A1C External 4.0 - 5.6 %   5.1      5.1      Estimated Avg Glucose 68 - 131 mg/dL   100      100      GlycoMark (TM) ug/mL         20.3      TSH 0.400 - 4.000 uIU/mL   1.140      0.802      T3, Total 60 - 180 ng/dL         103      Free T4 0.71 - 1.51 ng/dL         0.82      Thyroglobulin Interpretation          SEE BELOW      Thyroglobulin Antibody Screen <1.8 IU/mL         <1.8      Thyroglobulin, Tumor Marker ng/mL         14 (H)      Hep A IgM Negative        Negative        Hep B C IgM Negative        Negative        Hepatitis B Surface Ag Negative        Negative        Hepatitis C Ab Negative        Negative        HIV 1/2 Ag/Ab Negative        Negative        Chlamydia, Amplified DNA Not Detected       Not Detected         HSV Ab, IgM by EIA <=0.90 INDEX       0.75        N gonorrhoeae, amplified DNA Not Detected       Not Detected         RPR Non-reactive        Non-reactive        HSV 1 IgG Negative        Negative        HSV 2 IgG Negative        Negative        Specimen UA   Urine, Clean Catch             Color, UA Yellow, Straw, Maddy   Yellow             Appearance, UA Clear   Clear             Specific Gravity, UA 1.005 - 1.030   1.015             pH, UA 5.0 - 8.0   5.0             Protein, UA Negative   Negative             Glucose, UA Negative   Negative             Ketones, UA Negative   Negative             Occult Blood UA Negative   Negative             NITRITE UA Negative   Negative             Bilirubin (UA) Negative   Negative             Leukocytes, UA Negative   Negative             Creatinine, Urine 15.0 - 325.0 mg/dL  112.0             Urine Microalbumin ug/mL  <5.0             MICROALB/CREAT RATIO 0.0 - 30.0 ug/mg  Unable to calculate             CULTURE, URINE           Rpt !     Color, POC UA Yellow, Straw, Colorless            Yellow    Clarity, POC UA Clear            Clear    pH, POC UA 5.0 - 8.0            7.0    WBC, POC UA  "Negative            Negative    Nitrite, POC UA Negative            Negative    Urobilinogen, POC UA <=1.0            0.2    Protein, POC UA Negative            Negative    Blood, POC UA Negative            Trace-intact !    Ketones, POC UA Negative            Negative    Bilirubin, POC UA Negative            Negative    Glucose, POC UA Negative            Negative    FRUCTOSAMINE 151 - 300 umol /L         174      MAMMO DIGITAL SCREENING BILAT WITH WOODROW         Rpt       US SOFT TISSUE HEAD NECK THYROID     Rpt           SPECIMEN TO PATHOLOGY, DERMATOLOGY             Rpt   Final Pathologic Diagnosis             The biopsy has been sent to [facility] and the results will be issued in an  addendum.     Gross             Container Label: Clinic Number/AP Number:  [id number] / [id number], and "right  lower chest"  Received in formalin is a 3 x 3 mm punch biopsy fragment of light brown skin  excised to a depth of 5 mm.  Specimen is bisected and entirely submitted in  [facility]--1-A.  Kai Baca, P.A.     Disclaimer             Unless the case is a 'gross only' or additional testing only, the final  diagnosis for each specimen is based on a microscopic examination of  appropriate tissue sections.     Spec Grav POC UA 1.005 - 1.030            1.015    (L): Data is abnormally low  (H): Data is abnormally high  !: Data is abnormal  Rpt: View report in Results Review for more information      Assessment:     1. Hypothyroidism (acquired)  CBC Auto Differential    Lipid Panel    Uric Acid    T4, Free    T3    TSH      2. Type 2 diabetes mellitus with hyperglycemia, without long-term current use of insulin  Comprehensive Metabolic Panel    CBC Auto Differential    Lipid Panel    Urinalysis    Microalbumin/Creatinine Ratio, Urine      3. Abnormal thyroid blood test        4. Hypovitaminosis D  CBC Auto Differential    Calcium, Ionized    Vitamin D    PTH, Intact    ergocalciferol (ERGOCALCIFEROL) 50,000 unit Cap      5. GERD without " esophagitis  CBC Auto Differential      6. Thyroiditis  Thyroglobulin      7. Abnormal thyroid function test        8. Pseudoseizures        9. Attention deficit hyperactivity disorder (ADHD), predominantly inattentive type        10. Other depression        11. Primary hypertension  Lipid Panel    Urinalysis    Microalbumin/Creatinine Ratio, Urine      12. Hypercholesterolemia  Lipid Panel      13. Hyperlipidemia, unspecified hyperlipidemia type  Lipid Panel      14. Acne vulgaris        15. Anxiety        16. Conversion disorder        17. Migraine without aura and without status migrainosus, not intractable        18. Chronic fatigue  Sedimentation rate             Regarding hypothyroidism; Patient appears clinically euthyroid. To continue LT4 at present dose and may adjust LT4 dose based on results.  Regarding possible thyroid nodule in right hemithyroid; to obtain ffup neck USs.  Regarding early type 2  diabetes; She is to ensure SMBG checks 2-3 x weekly and remains of metformin for now. To also recheck HBA1c.  To continue strict low glycemic diet adherence as before.   Regarding dyslipidemia; discussed dietary interventions to optimize her lipid profile with aim of LDL being under 100. Will recheck lipid profile in another 6 mths and if not at goal by then will need to consider starting low dose statin therapy.  Regarding GERD; symptomatically stable on PPI therapy.  Regarding hypovitaminosis D; to continue vitamin D supplementation and will recheck 25OH vitamin d levels.  Regarding depression; mood stable. To continue Effexor XR as before.  Regarding chronic fatigue; to await results of latest labs and if symptoms persist @ ffup will consider obtaining a sleep study.         Plan:       FFup in ~ 6mths

## 2023-03-22 ENCOUNTER — CLINICAL SUPPORT (OUTPATIENT)
Dept: DERMATOLOGY | Facility: CLINIC | Age: 43
End: 2023-03-22
Payer: MEDICAID

## 2023-03-22 DIAGNOSIS — L98.9 DISEASE OF SKIN AND SUBCUTANEOUS TISSUE: Primary | ICD-10-CM

## 2023-03-22 PROCEDURE — 99024 PR POST-OP FOLLOW-UP VISIT: ICD-10-PCS | Mod: ,,, | Performed by: DERMATOLOGY

## 2023-03-22 PROCEDURE — 99024 POSTOP FOLLOW-UP VISIT: CPT | Mod: ,,, | Performed by: DERMATOLOGY

## 2023-03-24 ENCOUNTER — PATIENT MESSAGE (OUTPATIENT)
Dept: DERMATOLOGY | Facility: CLINIC | Age: 43
End: 2023-03-24
Payer: MEDICAID

## 2023-03-27 DIAGNOSIS — L29.9 SCALP PRURITUS: Primary | ICD-10-CM

## 2023-03-27 RX ORDER — FLUOCINOLONE ACETONIDE 0.11 MG/ML
OIL TOPICAL
Qty: 120 ML | Refills: 5 | Status: SHIPPED | OUTPATIENT
Start: 2023-03-27

## 2023-04-11 ENCOUNTER — PATIENT MESSAGE (OUTPATIENT)
Dept: DERMATOLOGY | Facility: CLINIC | Age: 43
End: 2023-04-11
Payer: MEDICAID

## 2023-04-13 ENCOUNTER — PATIENT MESSAGE (OUTPATIENT)
Dept: DERMATOLOGY | Facility: CLINIC | Age: 43
End: 2023-04-13
Payer: MEDICAID

## 2023-04-13 LAB
FINAL PATHOLOGIC DIAGNOSIS: NORMAL
GROSS: NORMAL
Lab: NORMAL
SUPPLEMENTAL DIAGNOSIS: NORMAL

## 2023-05-12 ENCOUNTER — OFFICE VISIT (OUTPATIENT)
Dept: DERMATOLOGY | Facility: CLINIC | Age: 43
End: 2023-05-12
Payer: MEDICAID

## 2023-05-12 VITALS — HEIGHT: 64 IN | WEIGHT: 145 LBS | BODY MASS INDEX: 24.75 KG/M2

## 2023-05-12 DIAGNOSIS — L30.9 DERMATITIS: Primary | ICD-10-CM

## 2023-05-12 PROCEDURE — 3066F NEPHROPATHY DOC TX: CPT | Mod: CPTII,,, | Performed by: DERMATOLOGY

## 2023-05-12 PROCEDURE — 1159F PR MEDICATION LIST DOCUMENTED IN MEDICAL RECORD: ICD-10-PCS | Mod: CPTII,,, | Performed by: DERMATOLOGY

## 2023-05-12 PROCEDURE — 3008F PR BODY MASS INDEX (BMI) DOCUMENTED: ICD-10-PCS | Mod: CPTII,,, | Performed by: DERMATOLOGY

## 2023-05-12 PROCEDURE — 99213 OFFICE O/P EST LOW 20 MIN: CPT | Mod: PBBFAC,PO | Performed by: DERMATOLOGY

## 2023-05-12 PROCEDURE — 99213 PR OFFICE/OUTPT VISIT, EST, LEVL III, 20-29 MIN: ICD-10-PCS | Mod: S$PBB,,, | Performed by: DERMATOLOGY

## 2023-05-12 PROCEDURE — 3008F BODY MASS INDEX DOCD: CPT | Mod: CPTII,,, | Performed by: DERMATOLOGY

## 2023-05-12 PROCEDURE — 1159F MED LIST DOCD IN RCRD: CPT | Mod: CPTII,,, | Performed by: DERMATOLOGY

## 2023-05-12 PROCEDURE — 1160F PR REVIEW ALL MEDS BY PRESCRIBER/CLIN PHARMACIST DOCUMENTED: ICD-10-PCS | Mod: CPTII,,, | Performed by: DERMATOLOGY

## 2023-05-12 PROCEDURE — 99999 PR PBB SHADOW E&M-EST. PATIENT-LVL III: ICD-10-PCS | Mod: PBBFAC,,, | Performed by: DERMATOLOGY

## 2023-05-12 PROCEDURE — 1160F RVW MEDS BY RX/DR IN RCRD: CPT | Mod: CPTII,,, | Performed by: DERMATOLOGY

## 2023-05-12 PROCEDURE — 99999 PR PBB SHADOW E&M-EST. PATIENT-LVL III: CPT | Mod: PBBFAC,,, | Performed by: DERMATOLOGY

## 2023-05-12 PROCEDURE — 3061F PR NEG MICROALBUMINURIA RESULT DOCUMENTED/REVIEW: ICD-10-PCS | Mod: CPTII,,, | Performed by: DERMATOLOGY

## 2023-05-12 PROCEDURE — 3066F PR DOCUMENTATION OF TREATMENT FOR NEPHROPATHY: ICD-10-PCS | Mod: CPTII,,, | Performed by: DERMATOLOGY

## 2023-05-12 PROCEDURE — 3061F NEG MICROALBUMINURIA REV: CPT | Mod: CPTII,,, | Performed by: DERMATOLOGY

## 2023-05-12 PROCEDURE — 99213 OFFICE O/P EST LOW 20 MIN: CPT | Mod: S$PBB,,, | Performed by: DERMATOLOGY

## 2023-05-12 NOTE — PROGRESS NOTES
Subjective:      Patient ID:  Moris Alfaro is a 43 y.o. female who presents for   Chief Complaint   Patient presents with    Follow-up     Biopsy Results     LOV 3/8/23- Disease of skin and subcutaneous tissue    Report finally signed out by pathologist is reassuring, no evidence of CTCL 9(Tcell lymphoma of the skin) Seen. We need to monitor this rash long term. Did it clear with topical steroids?  [place] FINAL DIAGNOSIS   A. Right lower chest, [id number], 03/08/2023: Sparse perivascular   lymphocytic inflammation   COMMENT   Clinical notes and photographs reviewed and appreciated. The lymphocytes   express CD3 with a reactive ratio of CD4 to CD8. PAS stain is negative for   fungal microorganisms. Microscopic features of CTCL are not observed in this   specimen. Changes are subtle and non-diagnostic. If clinical concern   persists, consider additional sampling.   A comprehensive consult with review of records that included clinical notes   and clinical photos was performed to assist in the diagnostic assessment of   the case.   Marilyn Diaz M.D.     Patient here today for F/U on dry skin.  Pt was using Triamcinolone as instructed, helped. No longer applying.  Pt not using fluocinolone due to insurance reasons.    Derm Hx:  Denies Phx of NMSC  Denies Fhx of MM    Current Outpatient Medications:   ·  aspirin (ECOTRIN) 81 MG EC tablet, Take 1 tablet (81 mg total) by mouth once daily., Disp: 90 tablet, Rfl: 3  ·  cyclobenzaprine (FLEXERIL) 10 MG tablet, TAKE 1 TABLET BY MOUTH TWICE DAILY AS NEEDED, Disp: 60 tablet, Rfl: 3  ·  ergocalciferol (ERGOCALCIFEROL) 50,000 unit Cap, TAKE 1 CAPSULE BY MOUTH EVERY 7 DAYS, Disp: 12 capsule, Rfl: 3  ·  FEROSUL 325 mg (65 mg iron) Tab tablet, TAKE 1 TABLET BY MOUTH EVERY DAY WITH BREAKFAST, Disp: 90 tablet, Rfl: 3  ·  fluocinolone (DERMA-SMOOTHE) 0.01 % external oil, Apply to scalp daily to BID PRN dry flaky scalp, Disp: 120 mL, Rfl: 5  ·  gabapentin (NEURONTIN) 800 MG  tablet, TAKE 1 TABLET(800 MG) BY MOUTH TWICE DAILY, Disp: 60 tablet, Rfl: 3  ·  levothyroxine (SYNTHROID) 75 MCG tablet, TAKE 1 TABLET BY MOUTH EVERY DAY, Disp: 90 tablet, Rfl: 3  ·  liothyronine (CYTOMEL) 5 MCG Tab, TAKE 1 TABLET BY MOUTH EVERY DAY, Disp: 90 tablet, Rfl: 3  ·  ONETOUCH DELICA LANCETS 33 gauge Misc, TEST EVERY DAY AS DIRECTED, Disp: 100 each, Rfl: 3  ·  ONETOUCH VERIO Strp, USE AS DIRECTED ONCE DAILY, Disp: 100 strip, Rfl: 3  ·  pravastatin (PRAVACHOL) 20 MG tablet, Take 1 tablet (20 mg total) by mouth once daily., Disp: 90 tablet, Rfl: 3  ·  propranoloL (INDERAL LA) 120 MG 24 hr capsule, Take 120 mg by mouth once daily., Disp: , Rfl:   ·  topiramate (TOPAMAX) 50 MG tablet, TAKE 1 TABLET BY MOUTH TWICE DAILY, Disp: 60 tablet, Rfl: 11  ·  traZODone (DESYREL) 50 MG tablet, TAKE 1 TABLET(50 MG) BY MOUTH EVERY EVENING, Disp: 30 tablet, Rfl: 1  ·  triamcinolone acetonide 0.1% (KENALOG) 0.1 % cream, AAA bid, Disp: 454 g, Rfl: 3              Review of Systems   Constitutional:  Positive for fatigue. Negative for fever, chills, weight loss and night sweats.   HENT:  Negative for nosebleeds and headaches.    Gastrointestinal:  Negative for nausea, vomiting, abdominal pain and diarrhea.   Musculoskeletal:  Negative for myalgias and arthralgias.   Skin:  Positive for itching and dry skin. Negative for rash, sun sensitivity, daily sunscreen use, activity-related sunscreen use, recent sunburn and dry lips.   Neurological:  Negative for headaches.   Psychiatric/Behavioral:  Negative for depressed mood.      Objective:   Physical Exam   Constitutional: She appears well-developed and well-nourished. No distress.   Neurological: She is alert and oriented to person, place, and time. She is not disoriented.   Psychiatric: She has a normal mood and affect.   Skin:   Areas Examined (abnormalities noted in diagram):   Scalp / Hair Palpated and Inspected  Head / Face Inspection Performed  Neck Inspection Performed  Chest  / Axilla Inspection Performed  Abdomen Inspection Performed  Back Inspection Performed  RUE Inspected  LUE Inspection Performed  Nails and Digits Inspection Performed          Diagram Legend     Erythematous scaling macule/papule c/w actinic keratosis       Vascular papule c/w angioma      Pigmented verrucoid papule/plaque c/w seborrheic keratosis      Yellow umbilicated papule c/w sebaceous hyperplasia      Irregularly shaped tan macule c/w lentigo     1-2 mm smooth white papules consistent with Milia      Movable subcutaneous cyst with punctum c/w epidermal inclusion cyst      Subcutaneous movable cyst c/w pilar cyst      Firm pink to brown papule c/w dermatofibroma      Pedunculated fleshy papule(s) c/w skin tag(s)      Evenly pigmented macule c/w junctional nevus     Mildly variegated pigmented, slightly irregular-bordered macule c/w mildly atypical nevus      Flesh colored to evenly pigmented papule c/w intradermal nevus       Pink pearly papule/plaque c/w basal cell carcinoma      Erythematous hyperkeratotic cursted plaque c/w SCC      Surgical scar with no sign of skin cancer recurrence      Open and closed comedones      Inflammatory papules and pustules      Verrucoid papule consistent consistent with wart     Erythematous eczematous patches and plaques     Dystrophic onycholytic nail with subungual debris c/w onychomycosis     Umbilicated papule    Erythematous-base heme-crusted tan verrucoid plaque consistent with inflamed seborrheic keratosis     Erythematous Silvery Scaling Plaque c/w Psoriasis     See annotation      Assessment / Plan:        Dermatitis    Reviewed pathology report and significance  Biopsy ruled out CTCL, reassurance  Sensitive skin care routine  Cerave cream daily after shower  Triamcinolone cream PRN rash- has not needed in the past weeks         No follow-ups on file.

## 2023-05-22 DIAGNOSIS — E03.9 HYPOTHYROIDISM, UNSPECIFIED TYPE: ICD-10-CM

## 2023-05-22 RX ORDER — LIOTHYRONINE SODIUM 5 UG/1
5 TABLET ORAL DAILY
Qty: 90 TABLET | Refills: 3 | Status: SHIPPED | OUTPATIENT
Start: 2023-05-22 | End: 2024-05-21

## 2023-05-22 NOTE — TELEPHONE ENCOUNTER
LV 03/17/23. LL 03/17/23  Received request from [employer] [phone] drug change to brand name Cytomel d/t insurance.

## 2023-07-21 DIAGNOSIS — R53.1 LEFT-SIDED WEAKNESS: ICD-10-CM

## 2023-07-21 RX ORDER — TOPIRAMATE 50 MG/1
TABLET, FILM COATED ORAL
Qty: 60 TABLET | Refills: 11 | OUTPATIENT
Start: 2023-07-21

## 2024-03-27 ENCOUNTER — PATIENT MESSAGE (OUTPATIENT)
Dept: DERMATOLOGY | Facility: CLINIC | Age: 44
End: 2024-03-27
Payer: COMMERCIAL

## 2024-05-17 ENCOUNTER — HOSPITAL ENCOUNTER (EMERGENCY)
Facility: HOSPITAL | Age: 44
Discharge: HOME | End: 2024-05-18
Attending: EMERGENCY MEDICINE
Payer: COMMERCIAL
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
## 2024-05-17 DIAGNOSIS — M25.512 ACUTE PAIN OF LEFT SHOULDER: ICD-10-CM

## 2024-05-17 DIAGNOSIS — G44.319 ACUTE POST-TRAUMATIC HEADACHE, NOT INTRACTABLE: ICD-10-CM

## 2024-05-17 DIAGNOSIS — S16.1XXA STRAIN OF NECK MUSCLE, INITIAL ENCOUNTER: Primary | ICD-10-CM

## 2024-05-17 PROCEDURE — 99284 EMERGENCY DEPT VISIT MOD MDM: CPT | Mod: 25

## 2024-05-17 PROCEDURE — 25000003 PHARM REV CODE 250: Performed by: EMERGENCY MEDICINE

## 2024-05-17 RX ORDER — NAPROXEN 250 MG/1
500 TABLET ORAL
Status: COMPLETED | OUTPATIENT
Start: 2024-05-17 | End: 2024-05-17

## 2024-05-17 RX ORDER — METHOCARBAMOL 750 MG/1
750 TABLET, FILM COATED ORAL
Status: COMPLETED | OUTPATIENT
Start: 2024-05-17 | End: 2024-05-17

## 2024-05-17 RX ORDER — METHOCARBAMOL 750 MG/1
750 TABLET, FILM COATED ORAL 3 TIMES DAILY
Qty: 15 TABLET | Refills: 0 | Status: SHIPPED | OUTPATIENT
Start: 2024-05-17 | End: 2024-05-22

## 2024-05-17 RX ORDER — NAPROXEN 500 MG/1
500 TABLET ORAL 2 TIMES DAILY WITH MEALS
Qty: 20 TABLET | Refills: 0 | Status: SHIPPED | OUTPATIENT
Start: 2024-05-17 | End: 2024-05-27

## 2024-05-17 RX ADMIN — NAPROXEN 500 MG: 250 TABLET ORAL at 11:05

## 2024-05-17 RX ADMIN — METHOCARBAMOL TABLETS 750 MG: 750 TABLET, COATED ORAL at 11:05

## 2024-05-17 NOTE — Clinical Note
"Moris De Leon" Dominic was seen and treated in our emergency department on 5/17/2024.  She may return to work on 05/18/2024.  Please excuse from work on May 17th and 18th     If you have any questions or concerns, please don't hesitate to call.      Nora Palencia MD"

## 2024-05-18 VITALS
TEMPERATURE: 98 F | WEIGHT: 164 LBS | HEART RATE: 63 BPM | RESPIRATION RATE: 18 BRPM | SYSTOLIC BLOOD PRESSURE: 172 MMHG | DIASTOLIC BLOOD PRESSURE: 82 MMHG | BODY MASS INDEX: 28 KG/M2 | HEIGHT: 64 IN | OXYGEN SATURATION: 99 %
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
## 2025-03-04 ENCOUNTER — OFFICE VISIT (OUTPATIENT)
Dept: URGENT CARE | Facility: CLINIC | Age: 45
End: 2025-03-04
Payer: MEDICAID

## 2025-03-04 VITALS
WEIGHT: 164 LBS | HEIGHT: 64 IN | SYSTOLIC BLOOD PRESSURE: 139 MMHG | OXYGEN SATURATION: 98 % | DIASTOLIC BLOOD PRESSURE: 90 MMHG | RESPIRATION RATE: 18 BRPM | HEART RATE: 102 BPM | TEMPERATURE: 98 F | BODY MASS INDEX: 28 KG/M2

## 2025-03-04 DIAGNOSIS — Z86.79 HISTORY OF HYPERTENSION: ICD-10-CM

## 2025-03-04 DIAGNOSIS — R05.9 COUGH, UNSPECIFIED TYPE: Primary | ICD-10-CM

## 2025-03-04 DIAGNOSIS — J10.1 INFLUENZA A: ICD-10-CM

## 2025-03-04 DIAGNOSIS — Z86.39 HISTORY OF TYPE 2 DIABETES MELLITUS: ICD-10-CM

## 2025-03-04 LAB
CTP QC/QA: YES
CTP QC/QA: YES
FLUAV AG NPH QL: POSITIVE
FLUBV AG NPH QL: NEGATIVE
SARS CORONAVIRUS 2 ANTIGEN: NEGATIVE

## 2025-03-04 PROCEDURE — 87811 SARS-COV-2 COVID19 W/OPTIC: CPT | Mod: QW,S$GLB,, | Performed by: NURSE PRACTITIONER

## 2025-03-04 PROCEDURE — 99204 OFFICE O/P NEW MOD 45 MIN: CPT | Mod: S$GLB,,, | Performed by: NURSE PRACTITIONER

## 2025-03-04 PROCEDURE — 87804 INFLUENZA ASSAY W/OPTIC: CPT | Mod: QW,,, | Performed by: NURSE PRACTITIONER

## 2025-03-04 NOTE — LETTER
March 4, 2025      [facility]  [address]  [facility] [address]  Phone: [phone]       Patient: Moris Alfaro   YOB: 1980  Date of Visit: 03/04/2025    To Whom It May Concern:    Israel Alfaro  was at [facility] on 03/04/2025. The patient may return to work/school on 03/10/2025  with no restrictions. If you have any questions or concerns, or if I can be of further assistance, please do not hesitate to contact me.    Sincerely,    Monica Cervantes, NP

## 2025-03-04 NOTE — PROGRESS NOTES
"Subjective:      Patient ID: Moris Alfaro is a 45 y.o. female.    Vitals:  height is 5' 4" (1.626 m) and weight is 74.4 kg (164 lb). Her oral temperature is 98 °F (36.7 °C). Her blood pressure is 139/90 (abnormal) and her pulse is 102. Her respiration is 18 and oxygen saturation is 98%.     Chief Complaint: Cough    Onset of symptoms 4 days ago    Cough  This is a new problem. Episode onset: 2/28. The problem has been gradually worsening. The cough is Productive of sputum. Associated symptoms include headaches, myalgias, nasal congestion and postnasal drip. Pertinent negatives include no fever, rash, shortness of breath or wheezing. Associated symptoms comments: fatigue. Treatments tried: mucinex. The treatment provided no relief.       Constitution: Positive for appetite change, fatigue and generalized weakness. Negative for fever.   HENT:  Positive for postnasal drip.    Respiratory:  Positive for cough and sputum production. Negative for chest tightness, shortness of breath, wheezing and asthma.    Gastrointestinal:  Negative for abdominal pain, nausea, vomiting and diarrhea.   Musculoskeletal:  Positive for muscle ache.   Skin:  Negative for rash.   Allergic/Immunologic: Negative for asthma.   Neurological:  Positive for headaches.      Objective:     Physical Exam   Constitutional: She is oriented to person, place, and time. She is cooperative.  Non-toxic appearance. She appears ill. No distress. obesityawake  HENT:   Head: Normocephalic and atraumatic.   Ears:   Right Ear: Tympanic membrane, external ear and ear canal normal.   Left Ear: Tympanic membrane, external ear and ear canal normal.   Nose: Congestion present. No rhinorrhea.   Mouth/Throat: Mucous membranes are moist. No oropharyngeal exudate or posterior oropharyngeal erythema.   Eyes: Conjunctivae are normal. Right eye exhibits no discharge. Left eye exhibits no discharge.   Cardiovascular: Normal rate, regular rhythm and normal heart sounds. "   Pulmonary/Chest: Effort normal and breath sounds normal. No accessory muscle usage. No tachypnea. No respiratory distress. She has no wheezes. She has no rhonchi. She has no rales. She exhibits no tenderness.   Abdominal: Normal appearance.   Neurological: no focal deficit. She is alert and oriented to person, place, and time. No sensory deficit.   Skin: Skin is warm, dry, not diaphoretic and no rash. Capillary refill takes 2 to 3 seconds.   Psychiatric: Her behavior is normal. Mood normal.   Nursing note and vitals reviewed.      Assessment:     1. Cough, unspecified type    2. History of hypertension    3. History of type 2 diabetes mellitus    4. Influenza A      COVID negative  Flu a positive  Flu/B negative  Not candidate for antivirals as symptoms began greater than 48 hours prior  Plan:       Cough, unspecified type  -     SARS Coronavirus 2 Antigen, POCT Manual Read  -     POCT Influenza A/B Rapid Antigen    History of hypertension    History of type 2 diabetes mellitus    Influenza A

## 2025-03-06 ENCOUNTER — HOSPITAL ENCOUNTER (EMERGENCY)
Facility: HOSPITAL | Age: 45
Discharge: HOME | End: 2025-03-06
Attending: EMERGENCY MEDICINE
Payer: COMMERCIAL

## 2025-03-06 VITALS
WEIGHT: 168 LBS | TEMPERATURE: 98 F | HEIGHT: 65 IN | DIASTOLIC BLOOD PRESSURE: 74 MMHG | SYSTOLIC BLOOD PRESSURE: 157 MMHG | BODY MASS INDEX: 27.99 KG/M2 | HEART RATE: 79 BPM | OXYGEN SATURATION: 97 % | RESPIRATION RATE: 17 BRPM

## 2025-03-06 DIAGNOSIS — J11.1 INFLUENZA: ICD-10-CM

## 2025-03-06 DIAGNOSIS — R53.83 FATIGUE, UNSPECIFIED TYPE: Primary | ICD-10-CM

## 2025-03-06 LAB
ALBUMIN SERPL BCP-MCNC: 3.9 G/DL (ref 3.5–5.2)
ALP SERPL-CCNC: 52 U/L (ref 40–150)
ALT SERPL W/O P-5'-P-CCNC: 24 U/L (ref 10–44)
ANION GAP SERPL CALC-SCNC: 9 MMOL/L (ref 8–16)
AST SERPL-CCNC: 24 U/L (ref 10–40)
BASOPHILS # BLD AUTO: 0.03 K/UL (ref 0–0.2)
BASOPHILS NFR BLD: 0.9 % (ref 0–1.9)
BILIRUB SERPL-MCNC: 0.3 MG/DL (ref 0.1–1)
BUN SERPL-MCNC: 6 MG/DL (ref 6–20)
CALCIUM SERPL-MCNC: 8.5 MG/DL (ref 8.7–10.5)
CHLORIDE SERPL-SCNC: 104 MMOL/L (ref 95–110)
CO2 SERPL-SCNC: 22 MMOL/L (ref 23–29)
CREAT SERPL-MCNC: 0.8 MG/DL (ref 0.5–1.4)
DIFFERENTIAL METHOD BLD: ABNORMAL
EOSINOPHIL # BLD AUTO: 0 K/UL (ref 0–0.5)
EOSINOPHIL NFR BLD: 0.6 % (ref 0–8)
ERYTHROCYTE [DISTWIDTH] IN BLOOD BY AUTOMATED COUNT: 12.7 % (ref 11.5–14.5)
EST. GFR  (NO RACE VARIABLE): >60 ML/MIN/1.73 M^2
GLUCOSE SERPL-MCNC: 102 MG/DL (ref 70–110)
HCT VFR BLD AUTO: 43.7 % (ref 37–48.5)
HGB BLD-MCNC: 13.6 G/DL (ref 12–16)
IMM GRANULOCYTES # BLD AUTO: 0.01 K/UL (ref 0–0.04)
IMM GRANULOCYTES NFR BLD AUTO: 0.3 % (ref 0–0.5)
LYMPHOCYTES # BLD AUTO: 1.9 K/UL (ref 1–4.8)
LYMPHOCYTES NFR BLD: 56.1 % (ref 18–48)
MAGNESIUM SERPL-MCNC: 2.1 MG/DL (ref 1.6–2.6)
MCH RBC QN AUTO: 27.4 PG (ref 27–31)
MCHC RBC AUTO-ENTMCNC: 31.1 G/DL (ref 32–36)
MCV RBC AUTO: 88 FL (ref 82–98)
MONOCYTES # BLD AUTO: 0.4 K/UL (ref 0.3–1)
MONOCYTES NFR BLD: 12.2 % (ref 4–15)
NEUTROPHILS # BLD AUTO: 1 K/UL (ref 1.8–7.7)
NEUTROPHILS NFR BLD: 29.9 % (ref 38–73)
NRBC BLD-RTO: 0 /100 WBC
PLATELET # BLD AUTO: 242 K/UL (ref 150–450)
PMV BLD AUTO: 9.8 FL (ref 9.2–12.9)
POTASSIUM SERPL-SCNC: 4.3 MMOL/L (ref 3.5–5.1)
PROT SERPL-MCNC: 7.5 G/DL (ref 6–8.4)
RBC # BLD AUTO: 4.97 M/UL (ref 4–5.4)
SODIUM SERPL-SCNC: 135 MMOL/L (ref 136–145)
WBC # BLD AUTO: 3.35 K/UL (ref 3.9–12.7)

## 2025-03-06 PROCEDURE — 99283 EMERGENCY DEPT VISIT LOW MDM: CPT

## 2025-03-06 PROCEDURE — 85025 COMPLETE CBC W/AUTO DIFF WBC: CPT | Performed by: EMERGENCY MEDICINE

## 2025-03-06 PROCEDURE — 80053 COMPREHEN METABOLIC PANEL: CPT | Performed by: EMERGENCY MEDICINE

## 2025-03-06 PROCEDURE — 83735 ASSAY OF MAGNESIUM: CPT | Performed by: EMERGENCY MEDICINE

## 2025-03-06 PROCEDURE — 36415 COLL VENOUS BLD VENIPUNCTURE: CPT | Performed by: EMERGENCY MEDICINE

## 2025-03-06 NOTE — ED PROVIDER NOTES
Encounter Date: 3/6/2025       History     Chief Complaint   Patient presents with    Influenza     Pt states she has been feeling bad since last Friday when she was diagnosed with the flu. Pt states she is fatigue and just feels off and not getting better.     45-year-old female with a past medical history of anxiety, ADHD, hypertension, and fibromyalgia presents for fatigue and generalized weakness.  She reports that she was diagnosed with the flu several days ago, and has continued to feel bad since that time.  She reports that she actually started with her symptoms 6 days ago.  She denies any associated chest pain, shortness of breath, nausea/vomiting, or abdominal pain.  She reports some associated cough and diarrhea.  There are no alleviating factors.      Review of patient's allergies indicates:  No Known Allergies  Past Medical History:   Diagnosis Date    ADHD (attention deficit hyperactivity disorder)     Anxiety     ZUÑIGA (dyspnea on exertion)     has been worked up    Endometriosis, uterus     uterine bladder and ovaries    Fibromyalgia     Hypertension 1/17/2019    Migraine     Migraine headache     Mitral valve prolapse     Seizures     last seizure a couple of weeks ago, pt states that she has pseudo-seizures    Thyroid disease     Toe fracture      Past Surgical History:   Procedure Laterality Date    CYSTOSCOPY N/A 3/10/2021    Procedure: CYSTOSCOPY;  Surgeon: Wanda Beaver MD;  Location: [facility];  Service: Urology;  Laterality: N/A;    dx lap      ecsi      HYSTERECTOMY  2007    CORINA due to DUB,  ovaries intact    lumbar facet injection      [place] EXPLORATORY OF ABDOMEN      RHIZOTOMY      TUBAL LIGATION       Family History   Problem Relation Name Age of Onset    Thyroid disease Mother      Heart disease Father      Hypertension Father      Pancreatitis Father      Diabetes Father      Stomach cancer Paternal Aunt      Stomach cancer Paternal Uncle      Cancer Paternal Uncle          lung cancer     Ovarian cancer Paternal Cousin 1st     Ovarian cancer Paternal Cousin 1st     Ovarian cancer Maternal Cousin 2nd     Melanoma Neg Hx      Psoriasis Neg Hx      Lupus Neg Hx      Eczema Neg Hx      Breast cancer Neg Hx       Social History[1]  Review of Systems   Constitutional:  Positive for fatigue. Negative for chills and fever.   HENT:  Negative for congestion.    Respiratory:  Positive for cough. Negative for shortness of breath.    Cardiovascular:  Negative for chest pain.   Gastrointestinal:  Positive for diarrhea. Negative for abdominal pain, nausea and vomiting.   Genitourinary:  Negative for dysuria.   Musculoskeletal:  Negative for gait problem.   Skin:  Negative for color change.   Neurological:  Negative for dizziness and numbness.   Psychiatric/Behavioral:  Negative for agitation.        Physical Exam     Initial Vitals [03/06/25 0725]   BP Pulse Resp Temp SpO2   (!) 184/86 93 17 98.3 °F (36.8 °C) 98 %      MAP       --         Physical Exam    Nursing note and vitals reviewed.  Constitutional: She appears well-developed and well-nourished.   HENT:   Head: Atraumatic.   Eyes: EOM are normal. Pupils are equal, round, and reactive to light.   Neck:   Normal range of motion.  Cardiovascular:  Normal rate and regular rhythm.           Pulmonary/Chest: Breath sounds normal.   Abdominal: Abdomen is soft. Bowel sounds are normal. She exhibits no distension. There is no abdominal tenderness. There is no rebound and no guarding.   Musculoskeletal:         General: Normal range of motion.      Right shoulder: Normal.      Left shoulder: Normal.      Cervical back: Normal range of motion.     Neurological: She is alert and oriented to person, place, and time.   Skin: Skin is warm and dry.   Psychiatric: She has a normal mood and affect.         ED Course   Procedures  Labs Reviewed   COMPREHENSIVE METABOLIC PANEL - Abnormal       Result Value    Sodium 135 (*)     Potassium 4.3      Chloride 104      CO2 22 (*)      Glucose 102      BUN 6      Creatinine 0.8      Calcium 8.5 (*)     Total Protein 7.5      Albumin 3.9      Total Bilirubin 0.3      Alkaline Phosphatase 52      AST 24      ALT 24      eGFR >60      Anion Gap 9     CBC W/ AUTO DIFFERENTIAL - Abnormal    WBC 3.35 (*)     RBC 4.97      Hemoglobin 13.6      Hematocrit 43.7      MCV 88      MCH 27.4      MCHC 31.1 (*)     RDW 12.7      Platelets 242      MPV 9.8      Immature Granulocytes 0.3      Gran # (ANC) 1.0 (*)     Immature Grans (Abs) 0.01      Lymph # 1.9      Mono # 0.4      Eos # 0.0      Baso # 0.03      nRBC 0      Gran % 29.9 (*)     Lymph % 56.1 (*)     Mono % 12.2      Eosinophil % 0.6      Basophil % 0.9      Differential Method Automated     MAGNESIUM    Magnesium 2.1            Imaging Results    None          Medications - No data to display  Medical Decision Making  45-year-old female presents for fatigue.    Initial differential diagnosis included but not limited to dehydration, acute kidney injury, and continued viral infection.    Amount and/or Complexity of Data Reviewed  Labs: ordered.    Risk  Risk Details: The patient was emergently evaluated in the emergency department, her evaluation was significant for a middle-age female with a benign abdominal exam noted.  The patient's labs showed no acute abnormalities.  The etiology of the patient's symptoms is likely her continued influenza infection.  The patient does not appear to be dehydrated does not need IV fluid rehydration at this time.  The patient is stable for discharge to home and does not require further care or workup.  She is encouraged to continue her home medications as previously prescribed.  She is referred to primary care for follow-up.  She is also encouraged to continue to rest at home and drink plenty of liquids as well.                                      Clinical Impression:  Final diagnoses:  [R53.83] Fatigue, unspecified type (Primary)  [J11.1] Influenza          ED  Disposition Condition    Discharge           ED Prescriptions    None       Follow-up Information       Follow up With Specialties Details Why Contact Info    [facility]  Schedule an appointment as soon as possible for a visit   [facility] [address] [place] [id number] [phone]                   [1]   Social History  Tobacco Use    Smoking status: Never    Smokeless tobacco: Never   Substance Use Topics    Alcohol use: Yes     Comment: OCCASIONALLY    Drug use: No        Gus Drake MD  03/06/25 0961

## 2025-05-01 ENCOUNTER — LAB VISIT (OUTPATIENT)
Dept: LAB | Facility: HOSPITAL | Age: 45
End: 2025-05-01
Attending: NURSE PRACTITIONER
Payer: COMMERCIAL

## 2025-05-01 DIAGNOSIS — R79.89 LOW SERUM VITAMIN D: ICD-10-CM

## 2025-05-01 DIAGNOSIS — E78.00 PURE HYPERCHOLESTEROLEMIA: Primary | ICD-10-CM

## 2025-05-01 DIAGNOSIS — E03.9 HYPOTHYROIDISM, UNSPECIFIED TYPE: ICD-10-CM

## 2025-05-01 DIAGNOSIS — E11.9 DIABETES MELLITUS WITHOUT COMPLICATION: ICD-10-CM

## 2025-05-01 LAB
CHOLEST SERPL-MCNC: 236 MG/DL (ref 120–199)
CHOLEST/HDLC SERPL: 5.1 {RATIO} (ref 2–5)
EAG (SMH): 114 MG/DL (ref 68–131)
HBA1C MFR BLD: 5.6 % (ref 4–5.6)
HDLC SERPL-MCNC: 46 MG/DL (ref 40–75)
HDLC SERPL: 19.5 % (ref 20–50)
LDLC SERPL CALC-MCNC: 169 MG/DL (ref 63–159)
NONHDLC SERPL-MCNC: 190 MG/DL
T4 FREE SERPL-MCNC: 0.83 NG/DL (ref 0.71–1.51)
TRIGL SERPL-MCNC: 105 MG/DL (ref 30–150)
TSH SERPL-ACNC: 2.96 UIU/ML (ref 0.4–4)

## 2025-05-01 PROCEDURE — 82306 VITAMIN D 25 HYDROXY: CPT

## 2025-05-01 PROCEDURE — 36415 COLL VENOUS BLD VENIPUNCTURE: CPT

## 2025-05-01 PROCEDURE — 83036 HEMOGLOBIN GLYCOSYLATED A1C: CPT

## 2025-05-01 PROCEDURE — 84439 ASSAY OF FREE THYROXINE: CPT

## 2025-05-01 PROCEDURE — 82465 ASSAY BLD/SERUM CHOLESTEROL: CPT

## 2025-05-08 LAB
24,25(OH)2 VITD SERPL-MCNC: 1.86 NG/ML
25(OH)D2 SERPL-MCNC: 14 NG/ML
25(OH)D3 SERPL-MCNC: 17 NG/ML
25(OH)D3+25(OH)D2 SERPL-MCNC: 31 NG/ML
25(OH)D3+25(OH)D2/24,25(OH)2 VITD SERPL: 16.67 {RATIO}

## (undated) DEVICE — BAG URINE DRAIN LATEX FREE

## (undated) DEVICE — POWDER ARISTA AH 1GM

## (undated) DEVICE — SEE MEDLINE ITEM 157128

## (undated) DEVICE — KIT ANTIFOG

## (undated) DEVICE — SYR 10CC LUER LOCK

## (undated) DEVICE — GLOVE SURGICAL LATEX SZ 8

## (undated) DEVICE — WATER STERILE INJ 500ML BAG

## (undated) DEVICE — DRAPE TIBURON 36X104X123IN

## (undated) DEVICE — SEE L#120831

## (undated) DEVICE — Device

## (undated) DEVICE — BLADE SURG CARBON STEEL SZ11

## (undated) DEVICE — GLOVE SURGEONS ULTRA TOUCH 6.5

## (undated) DEVICE — TUBING PNEUMO

## (undated) DEVICE — TROCAR ENDOPATH XCEL 5X75MM

## (undated) DEVICE — SEE ITEM #152308

## (undated) DEVICE — SEE MEDLINE ITEM 154981

## (undated) DEVICE — SUT 4/0 18IN COATED VICRYL

## (undated) DEVICE — APPLICATOR ARISTA FLEX XL

## (undated) DEVICE — DRESSING MEPORE PRO 6 X 7 CM

## (undated) DEVICE — SET CYSTO IRRIGATION UNIV SPIK

## (undated) DEVICE — LEGGINGS 48X31 INCH

## (undated) DEVICE — SEE MEDLINE ITEM 157181

## (undated) DEVICE — TROCAR ENDOPATH XCEL 5MM 7.5CM